# Patient Record
Sex: MALE | Race: WHITE | Employment: OTHER | URBAN - METROPOLITAN AREA
[De-identification: names, ages, dates, MRNs, and addresses within clinical notes are randomized per-mention and may not be internally consistent; named-entity substitution may affect disease eponyms.]

---

## 2017-01-10 PROBLEM — J44.9 CHRONIC OBSTRUCTIVE PULMONARY DISEASE (HCC): Status: ACTIVE | Noted: 2017-01-10

## 2017-01-10 PROBLEM — I10 ESSENTIAL HYPERTENSION: Status: ACTIVE | Noted: 2017-01-10

## 2017-01-10 PROBLEM — E78.2 ELEVATED TRIGLYCERIDES WITH HIGH CHOLESTEROL: Status: ACTIVE | Noted: 2017-01-10

## 2017-02-10 PROBLEM — F17.210 SMOKING GREATER THAN 40 PACK YEARS: Status: ACTIVE | Noted: 2017-02-10

## 2017-05-10 PROBLEM — J43.9 NOCTURNAL HYPOXEMIA DUE TO EMPHYSEMA (HCC): Status: ACTIVE | Noted: 2017-05-10

## 2017-05-10 PROBLEM — J43.9 PULMONARY EMPHYSEMA (HCC): Status: ACTIVE | Noted: 2017-05-10

## 2017-05-10 PROBLEM — G47.36 NOCTURNAL HYPOXEMIA DUE TO EMPHYSEMA (HCC): Status: ACTIVE | Noted: 2017-05-10

## 2017-05-10 PROBLEM — R00.1 BRADYCARDIA, UNSPECIFIED: Status: ACTIVE | Noted: 2017-05-10

## 2017-06-13 PROBLEM — Z99.89 OBSTRUCTIVE SLEEP APNEA TREATED WITH CONTINUOUS POSITIVE AIRWAY PRESSURE (CPAP): Status: ACTIVE | Noted: 2017-06-13

## 2017-06-13 PROBLEM — G47.33 OBSTRUCTIVE SLEEP APNEA TREATED WITH CONTINUOUS POSITIVE AIRWAY PRESSURE (CPAP): Status: ACTIVE | Noted: 2017-06-13

## 2017-06-13 PROBLEM — Z90.81 HX OF SPLENECTOMY: Status: ACTIVE | Noted: 2017-06-13

## 2017-09-15 PROBLEM — J43.2 CENTRILOBULAR EMPHYSEMA (HCC): Status: ACTIVE | Noted: 2017-09-15

## 2017-12-15 PROBLEM — K63.5 POLYP OF COLON: Status: ACTIVE | Noted: 2017-12-15
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
## 2017-12-15 PROBLEM — S70.361A TICK BITE OF RIGHT THIGH: Status: ACTIVE | Noted: 2017-12-15

## 2018-01-30 PROBLEM — K21.00 GASTROESOPHAGEAL REFLUX DISEASE WITH ESOPHAGITIS: Status: ACTIVE | Noted: 2018-01-30

## 2018-01-30 PROBLEM — C18.9 ADENOCARCINOMA OF COLON (HCC): Status: ACTIVE | Noted: 2018-01-30

## 2018-01-30 PROBLEM — K92.1 BLACK TARRY STOOLS: Status: ACTIVE | Noted: 2018-01-30

## 2018-01-30 PROBLEM — N17.9 AKI (ACUTE KIDNEY INJURY) (HCC): Status: ACTIVE | Noted: 2018-01-30

## 2018-02-12 PROBLEM — F17.210 SMOKING GREATER THAN 40 PACK YEARS: Status: RESOLVED | Noted: 2017-02-10 | Resolved: 2018-02-12

## 2018-03-19 ENCOUNTER — HOSPITAL ENCOUNTER (OUTPATIENT)
Age: 68
Discharge: HOME | End: 2018-03-21
Payer: MEDICARE

## 2018-03-19 ENCOUNTER — NURSE ONLY (OUTPATIENT)
Dept: FAMILY MEDICINE CLINIC | Age: 68
End: 2018-03-19
Payer: MEDICARE

## 2018-03-19 DIAGNOSIS — E78.2 ELEVATED TRIGLYCERIDES WITH HIGH CHOLESTEROL: ICD-10-CM

## 2018-03-19 DIAGNOSIS — I10 ESSENTIAL HYPERTENSION: ICD-10-CM

## 2018-03-19 DIAGNOSIS — C18.9 ADENOCARCINOMA OF COLON (HCC): ICD-10-CM

## 2018-03-19 DIAGNOSIS — C18.9 ADENOCARCINOMA OF COLON (HCC): Primary | ICD-10-CM

## 2018-03-19 LAB
BASOPHILS ABSOLUTE: 0.08 E9/L (ref 0–0.2)
BASOPHILS RELATIVE PERCENT: 1.4 % (ref 0–2)
EOSINOPHILS ABSOLUTE: 0.2 E9/L (ref 0.05–0.5)
EOSINOPHILS RELATIVE PERCENT: 3.4 % (ref 0–6)
HCT VFR BLD CALC: 41.8 % (ref 37–54)
HEMOGLOBIN: 13.2 G/DL (ref 12.5–16.5)
IMMATURE GRANULOCYTES #: 0.01 E9/L
IMMATURE GRANULOCYTES %: 0.2 % (ref 0–5)
LYMPHOCYTES ABSOLUTE: 1.9 E9/L (ref 1.5–4)
LYMPHOCYTES RELATIVE PERCENT: 32.5 % (ref 20–42)
MCH RBC QN AUTO: 28.6 PG (ref 26–35)
MCHC RBC AUTO-ENTMCNC: 31.6 % (ref 32–34.5)
MCV RBC AUTO: 90.5 FL (ref 80–99.9)
MONOCYTES ABSOLUTE: 0.86 E9/L (ref 0.1–0.95)
MONOCYTES RELATIVE PERCENT: 14.7 % (ref 2–12)
NEUTROPHILS ABSOLUTE: 2.8 E9/L (ref 1.8–7.3)
NEUTROPHILS RELATIVE PERCENT: 47.8 % (ref 43–80)
PDW BLD-RTO: 14.9 FL (ref 11.5–15)
PLATELET # BLD: 353 E9/L (ref 130–450)
PMV BLD AUTO: 9.9 FL (ref 7–12)
RBC # BLD: 4.62 E12/L (ref 3.8–5.8)
WBC # BLD: 5.9 E9/L (ref 4.5–11.5)

## 2018-03-19 PROCEDURE — 36415 COLL VENOUS BLD VENIPUNCTURE: CPT | Performed by: NURSE PRACTITIONER

## 2018-03-19 PROCEDURE — 85025 COMPLETE CBC W/AUTO DIFF WBC: CPT

## 2018-03-20 ENCOUNTER — OFFICE VISIT (OUTPATIENT)
Dept: FAMILY MEDICINE CLINIC | Age: 68
End: 2018-03-20
Payer: MEDICARE

## 2018-03-20 VITALS
OXYGEN SATURATION: 97 % | TEMPERATURE: 98.6 F | SYSTOLIC BLOOD PRESSURE: 112 MMHG | BODY MASS INDEX: 26.38 KG/M2 | HEIGHT: 72 IN | RESPIRATION RATE: 16 BRPM | DIASTOLIC BLOOD PRESSURE: 68 MMHG | WEIGHT: 194.75 LBS | HEART RATE: 83 BPM

## 2018-03-20 DIAGNOSIS — I10 ESSENTIAL HYPERTENSION: Primary | ICD-10-CM

## 2018-03-20 DIAGNOSIS — C18.9 ADENOCARCINOMA OF COLON (HCC): ICD-10-CM

## 2018-03-20 PROCEDURE — 99213 OFFICE O/P EST LOW 20 MIN: CPT | Performed by: NURSE PRACTITIONER

## 2018-03-20 RX ORDER — IBUPROFEN 800 MG/1
TABLET ORAL
Refills: 0 | COMMUNITY
Start: 2018-02-17 | End: 2018-07-06 | Stop reason: ALTCHOICE

## 2018-03-20 ASSESSMENT — ENCOUNTER SYMPTOMS
VOMITING: 0
NAUSEA: 0
TROUBLE SWALLOWING: 1
SINUS PRESSURE: 0
COLOR CHANGE: 0
FACIAL SWELLING: 0
BACK PAIN: 0
SHORTNESS OF BREATH: 0
CONSTIPATION: 0
CHEST TIGHTNESS: 0
ABDOMINAL PAIN: 0
RHINORRHEA: 0
COUGH: 1
DIARRHEA: 0
CHOKING: 0
WHEEZING: 0
VOICE CHANGE: 0
SORE THROAT: 0
SINUS PAIN: 0
STRIDOR: 0

## 2018-03-20 NOTE — PATIENT INSTRUCTIONS
Patient Education        High Blood Pressure: Care Instructions  Your Care Instructions    If your blood pressure is usually above 140/90, you have high blood pressure, or hypertension. That means the top number is 140 or higher or the bottom number is 90 or higher, or both. Despite what a lot of people think, high blood pressure usually doesn't cause headaches or make you feel dizzy or lightheaded. It usually has no symptoms. But it does increase your risk for heart attack, stroke, and kidney or eye damage. The higher your blood pressure, the more your risk increases. Your doctor will give you a goal for your blood pressure. Your goal will be based on your health and your age. An example of a goal is to keep your blood pressure below 140/90. Lifestyle changes, such as eating healthy and being active, are always important to help lower blood pressure. You might also take medicine to reach your blood pressure goal.  Follow-up care is a key part of your treatment and safety. Be sure to make and go to all appointments, and call your doctor if you are having problems. It's also a good idea to know your test results and keep a list of the medicines you take. How can you care for yourself at home? Medical treatment  · If you stop taking your medicine, your blood pressure will go back up. You may take one or more types of medicine to lower your blood pressure. Be safe with medicines. Take your medicine exactly as prescribed. Call your doctor if you think you are having a problem with your medicine. · Talk to your doctor before you start taking aspirin every day. Aspirin can help certain people lower their risk of a heart attack or stroke. But taking aspirin isn't right for everyone, because it can cause serious bleeding. · See your doctor regularly. You may need to see the doctor more often at first or until your blood pressure comes down.   · If you are taking blood pressure medicine, talk to your doctor before arms.  ¨ Lightheadedness or sudden weakness. ¨ A fast or irregular heartbeat. ? · You have symptoms of a stroke. These may include:  ¨ Sudden numbness, tingling, weakness, or loss of movement in your face, arm, or leg, especially on only one side of your body. ¨ Sudden vision changes. ¨ Sudden trouble speaking. ¨ Sudden confusion or trouble understanding simple statements. ¨ Sudden problems with walking or balance. ¨ A sudden, severe headache that is different from past headaches. ? · You have severe back or belly pain. ?Do not wait until your blood pressure comes down on its own. Get help right away. ?Call your doctor now or seek immediate care if:  ? · Your blood pressure is much higher than normal (such as 180/110 or higher), but you don't have symptoms. ? · You think high blood pressure is causing symptoms, such as:  ¨ Severe headache. ¨ Blurry vision. ? Watch closely for changes in your health, and be sure to contact your doctor if:  ? · Your blood pressure measures 140/90 or higher at least 2 times. That means the top number is 140 or higher or the bottom number is 90 or higher, or both. ? · You think you may be having side effects from your blood pressure medicine. ? · Your blood pressure is usually normal, but it goes above normal at least 2 times. Where can you learn more? Go to https://Big Box LabspeZazoo.IES. org and sign in to your IPICO account. Enter F335 in the IQMax box to learn more about \"High Blood Pressure: Care Instructions. \"     If you do not have an account, please click on the \"Sign Up Now\" link. Current as of: Hortensia 10, 2017  Content Version: 11.5  © 9464-0380 [employer]. Care instructions adapted under license by [facility] ([facility]).  If you have questions about a medical condition or this instruction, always ask your healthcare professional. Zohaib Sevilla any warranty or liability for your use of this information.

## 2018-06-22 ENCOUNTER — TELEPHONE (OUTPATIENT)
Dept: FAMILY MEDICINE CLINIC | Age: 68
End: 2018-06-22

## 2018-06-25 ENCOUNTER — HOSPITAL ENCOUNTER (OUTPATIENT)
Age: 68
Discharge: HOME | End: 2018-06-27
Payer: MEDICARE

## 2018-06-25 ENCOUNTER — NURSE ONLY (OUTPATIENT)
Dept: FAMILY MEDICINE CLINIC | Age: 68
End: 2018-06-25
Payer: MEDICARE

## 2018-06-25 DIAGNOSIS — R00.1 BRADYCARDIA: ICD-10-CM

## 2018-06-25 DIAGNOSIS — I10 ESSENTIAL HYPERTENSION: ICD-10-CM

## 2018-06-25 DIAGNOSIS — K21.00 GASTROESOPHAGEAL REFLUX DISEASE WITH ESOPHAGITIS: ICD-10-CM

## 2018-06-25 DIAGNOSIS — E78.2 ELEVATED TRIGLYCERIDES WITH HIGH CHOLESTEROL: ICD-10-CM

## 2018-06-25 DIAGNOSIS — J43.9 PULMONARY EMPHYSEMA, UNSPECIFIED EMPHYSEMA TYPE (HCC): ICD-10-CM

## 2018-06-25 DIAGNOSIS — J43.2 CENTRILOBULAR EMPHYSEMA (HCC): ICD-10-CM

## 2018-06-25 DIAGNOSIS — I10 ESSENTIAL HYPERTENSION: Primary | ICD-10-CM

## 2018-06-25 LAB
ALBUMIN SERPL-MCNC: 4.1 G/DL (ref 3.5–5.2)
ALP BLD-CCNC: 73 U/L (ref 40–129)
ALT SERPL-CCNC: 9 U/L (ref 0–40)
ANION GAP SERPL CALCULATED.3IONS-SCNC: 13 MMOL/L (ref 7–16)
AST SERPL-CCNC: 15 U/L (ref 0–39)
BASOPHILS ABSOLUTE: 0.12 E9/L (ref 0–0.2)
BASOPHILS RELATIVE PERCENT: 2 % (ref 0–2)
BILIRUB SERPL-MCNC: 0.4 MG/DL (ref 0–1.2)
BUN BLDV-MCNC: 19 MG/DL (ref 8–23)
CALCIUM SERPL-MCNC: 9.2 MG/DL (ref 8.6–10.2)
CHLORIDE BLD-SCNC: 100 MMOL/L (ref 98–107)
CHOLESTEROL, TOTAL: 186 MG/DL (ref 0–199)
CO2: 27 MMOL/L (ref 22–29)
CREAT SERPL-MCNC: 1.2 MG/DL (ref 0.7–1.2)
EOSINOPHILS ABSOLUTE: 0.22 E9/L (ref 0.05–0.5)
EOSINOPHILS RELATIVE PERCENT: 3.7 % (ref 0–6)
GFR AFRICAN AMERICAN: >60
GFR NON-AFRICAN AMERICAN: >60 ML/MIN/1.73
GLUCOSE BLD-MCNC: 88 MG/DL (ref 74–109)
HCT VFR BLD CALC: 43.4 % (ref 37–54)
HDLC SERPL-MCNC: 42 MG/DL
HEMOGLOBIN: 13.6 G/DL (ref 12.5–16.5)
IMMATURE GRANULOCYTES #: 0.02 E9/L
IMMATURE GRANULOCYTES %: 0.3 % (ref 0–5)
LDL CHOLESTEROL CALCULATED: 123 MG/DL (ref 0–99)
LYMPHOCYTES ABSOLUTE: 1.88 E9/L (ref 1.5–4)
LYMPHOCYTES RELATIVE PERCENT: 31.4 % (ref 20–42)
MCH RBC QN AUTO: 28.2 PG (ref 26–35)
MCHC RBC AUTO-ENTMCNC: 31.3 % (ref 32–34.5)
MCV RBC AUTO: 89.9 FL (ref 80–99.9)
MONOCYTES ABSOLUTE: 0.92 E9/L (ref 0.1–0.95)
MONOCYTES RELATIVE PERCENT: 15.4 % (ref 2–12)
NEUTROPHILS ABSOLUTE: 2.83 E9/L (ref 1.8–7.3)
NEUTROPHILS RELATIVE PERCENT: 47.2 % (ref 43–80)
PDW BLD-RTO: 15.1 FL (ref 11.5–15)
PLATELET # BLD: 370 E9/L (ref 130–450)
PMV BLD AUTO: 9.3 FL (ref 7–12)
POTASSIUM SERPL-SCNC: 4.9 MMOL/L (ref 3.5–5)
RBC # BLD: 4.83 E12/L (ref 3.8–5.8)
SODIUM BLD-SCNC: 140 MMOL/L (ref 132–146)
TOTAL PROTEIN: 7 G/DL (ref 6.4–8.3)
TRIGL SERPL-MCNC: 106 MG/DL (ref 0–149)
VLDLC SERPL CALC-MCNC: 21 MG/DL
WBC # BLD: 6 E9/L (ref 4.5–11.5)

## 2018-06-25 PROCEDURE — 85025 COMPLETE CBC W/AUTO DIFF WBC: CPT

## 2018-06-25 PROCEDURE — 36415 COLL VENOUS BLD VENIPUNCTURE: CPT | Performed by: NURSE PRACTITIONER

## 2018-06-25 PROCEDURE — 80061 LIPID PANEL: CPT

## 2018-06-25 PROCEDURE — 80053 COMPREHEN METABOLIC PANEL: CPT

## 2018-07-06 ENCOUNTER — OFFICE VISIT (OUTPATIENT)
Dept: FAMILY MEDICINE CLINIC | Age: 68
End: 2018-07-06
Payer: MEDICARE

## 2018-07-06 VITALS
BODY MASS INDEX: 26.04 KG/M2 | OXYGEN SATURATION: 94 % | HEART RATE: 66 BPM | TEMPERATURE: 98.5 F | DIASTOLIC BLOOD PRESSURE: 68 MMHG | SYSTOLIC BLOOD PRESSURE: 112 MMHG | WEIGHT: 192.25 LBS | HEIGHT: 72 IN | RESPIRATION RATE: 18 BRPM

## 2018-07-06 DIAGNOSIS — D72.821 MONOCYTOSIS: ICD-10-CM

## 2018-07-06 DIAGNOSIS — K22.2 ESOPHAGEAL STRICTURE: Primary | ICD-10-CM

## 2018-07-06 DIAGNOSIS — J43.2 CENTRILOBULAR EMPHYSEMA (HCC): ICD-10-CM

## 2018-07-06 DIAGNOSIS — I10 ESSENTIAL HYPERTENSION: ICD-10-CM

## 2018-07-06 PROBLEM — H43.819 VITREOUS DEGENERATION: Status: ACTIVE | Noted: 2018-07-06

## 2018-07-06 PROBLEM — H26.40 AFTER-CATARACT: Status: ACTIVE | Noted: 2018-07-06

## 2018-07-06 PROBLEM — H52.4 PRESBYOPIA: Status: ACTIVE | Noted: 2018-07-06

## 2018-07-06 PROCEDURE — G0009 ADMIN PNEUMOCOCCAL VACCINE: HCPCS | Performed by: NURSE PRACTITIONER

## 2018-07-06 PROCEDURE — 99214 OFFICE O/P EST MOD 30 MIN: CPT | Performed by: NURSE PRACTITIONER

## 2018-07-06 PROCEDURE — 90732 PPSV23 VACC 2 YRS+ SUBQ/IM: CPT | Performed by: NURSE PRACTITIONER

## 2018-07-06 ASSESSMENT — ENCOUNTER SYMPTOMS
SINUS PAIN: 0
TROUBLE SWALLOWING: 0
WHEEZING: 0
CHOKING: 1
CHEST TIGHTNESS: 0
VOICE CHANGE: 0
RHINORRHEA: 0
SORE THROAT: 0
DIARRHEA: 0
ABDOMINAL PAIN: 1
SINUS PRESSURE: 0
COLOR CHANGE: 0
COUGH: 0
CONSTIPATION: 1
VOMITING: 0
SHORTNESS OF BREATH: 0
FACIAL SWELLING: 0
BACK PAIN: 0
NAUSEA: 0

## 2018-07-06 NOTE — PATIENT INSTRUCTIONS
ibuprofen. Some of these medicines can raise blood pressure. · Learn how to check your blood pressure at home. Lifestyle changes  · Stay at a healthy weight. This is especially important if you put on weight around the waist. Losing even 10 pounds can help you lower your blood pressure. · If your doctor recommends it, get more exercise. Walking is a good choice. Bit by bit, increase the amount you walk every day. Try for at least 30 minutes on most days of the week. You also may want to swim, bike, or do other activities. · Avoid or limit alcohol. Talk to your doctor about whether you can drink any alcohol. · Try to limit how much sodium you eat to less than 2,300 milligrams (mg) a day. Your doctor may ask you to try to eat less than 1,500 mg a day. · Eat plenty of fruits (such as bananas and oranges), vegetables, legumes, whole grains, and low-fat dairy products. · Lower the amount of saturated fat in your diet. Saturated fat is found in animal products such as milk, cheese, and meat. Limiting these foods may help you lose weight and also lower your risk for heart disease. · Do not smoke. Smoking increases your risk for heart attack and stroke. If you need help quitting, talk to your doctor about stop-smoking programs and medicines. These can increase your chances of quitting for good. When should you call for help? Call 911 anytime you think you may need emergency care. This may mean having symptoms that suggest that your blood pressure is causing a serious heart or blood vessel problem. Your blood pressure may be over 180/110.   For example, call 911 if:    · You have symptoms of a heart attack. These may include:  ¨ Chest pain or pressure, or a strange feeling in the chest.  ¨ Sweating. ¨ Shortness of breath. ¨ Nausea or vomiting. ¨ Pain, pressure, or a strange feeling in the back, neck, jaw, or upper belly or in one or both shoulders or arms. ¨ Lightheadedness or sudden weakness.   ¨ A fast or Esophageal Stricture  What is an esophageal stricture? A stricture is a narrowing in one area of the esophagus, the tube that carries food and liquid to your stomach. It most often happens close to where the esophagus meets the stomach. A stricture can make it hard to swallow. You may feel like food gets stuck in your esophagus. If you have gastroesophageal reflux disease (GERD), stomach acid can flow backward into the esophagus. This can damage the lining of your esophagus and cause a stricture. Other things that can cause a stricture include:  · Surgery, radiation, or other treatments on the esophagus. · Some diseases and infections. · Reactions to some chemicals or medicines. How are strictures diagnosed? Your doctor may check your esophagus if you are having trouble swallowing or if you feel like food is getting stuck. The doctor will use a tool called an endoscope, or scope. It's a thin, flexible, lighted viewing tool. It goes into the mouth and down the throat. Your doctor can use it to check for any problems. The scope can also be used to take a sample of tissue to test (biopsy). You might need an X-ray. For the X-ray, you may need to swallow a substance, such as barium, that makes it easier to see what happens in your esophagus. How are strictures treated? Strictures are usually treated with a procedure called esophageal dilation. Dilation can open up narrow areas of the esophagus. Before the procedure, you will get medicines through a needle in your vein (IV) in your arm or hand. These medicines reduce pain and will make you feel relaxed and drowsy. Your throat will also be numbed. You may not remember much about the treatment. The doctor will guide a balloon or a plastic tool for widening (dilator) down your throat and into your esophagus. The dilator is used to widen any narrow area. To guide the dilator, the doctor may use a scope. Or he or she may use a thin wire as a guide.   After the

## 2018-07-06 NOTE — PROGRESS NOTES
Results   Component Value Date    CHOL 186 06/25/2018    CHOL 165 01/19/2017     Lab Results   Component Value Date    TRIG 106 06/25/2018    TRIG 124 01/19/2017     Lab Results   Component Value Date    HDL 42 06/25/2018    HDL 39 01/19/2017     Lab Results   Component Value Date    LDLCALC 123 (H) 06/25/2018    LDLCALC 101 (H) 01/19/2017     Lab Results   Component Value Date    LABVLDL 21 06/25/2018    LABVLDL 25 01/19/2017       He would like the abdominal incision checked he thinks he may have a stitch. Will be painful off and on. Feels like you wrapped a thread around your finger and pulled it. S/P colon resection for adenocarcinoma.   Lab Results   Component Value Date    WBC 6.0 06/25/2018    HGB 13.6 06/25/2018    HCT 43.4 06/25/2018    MCV 89.9 06/25/2018     06/25/2018    LYMPHOPCT 31.4 06/25/2018    RBC 4.83 06/25/2018    MCH 28.2 06/25/2018    MCHC 31.3 (L) 06/25/2018    RDW 15.1 (H) 06/25/2018             Due for PPSV 23      Current Outpatient Prescriptions:     sucralfate (CARAFATE) 1 GM tablet, , Disp: , Rfl:     pantoprazole (PROTONIX) 40 MG tablet, , Disp: , Rfl:     albuterol sulfate HFA (PROAIR HFA) 108 (90 Base) MCG/ACT inhaler, Inhale 2 puffs into the lungs every 6 hours as needed for Wheezing, Disp: 1 Inhaler, Rfl: 1    ANORO ELLIPTA 62.5-25 MCG/INH AEPB inhaler, Inhale 1 puff into the lungs daily One puff inhalation daily, Disp: , Rfl:   Social History     Social History    Marital status: Single     Spouse name: N/A    Number of children: N/A    Years of education: N/A     Social History Main Topics    Smoking status: Former Smoker     Packs/day: 2.00     Types: Cigarettes     Start date: 1/10/1962     Quit date: 1/9/2018    Smokeless tobacco: Never Used    Alcohol use Yes      Comment: Social    Drug use: No    Sexual activity: Not Asked     Other Topics Concern    None     Social History Narrative    None       I have reviewed Tyrese's allergies, medications, problem visit:    Esophageal stricture New  Keep appt with Dr. Rebekah Bradley for esophageal dilatation    Essential hypertension stable  Hold the ramipril until able to swallow    Monocytosis worsening   Will continue to monitor with repeat CBCD in 2 weeks. Discussed follow up with oncologist Dr. Cheri Davidson as the patient cancelled the last appointment. Centrilobular emphysema-     PNEUMOVAX 23 subcutaneous/IM (Pneumococcal polysaccharide vaccine 23-valent >= 1yo)      Discussed if the abdominal discomfort worsens to call Dr. Jose Craig office for evaluation as he may be developing adhesions. Return in about 2 weeks (around 7/20/2018) for check CBC then see me in 3 months for BP. Discussed smoking cessation, Discussed exercising 30 minutes daily and Discussed taking medications as directed and adverse effects        I have reviewed my findings and recommendations with Myles Toledo.     Esperanza Dickson, NP-C, FNP-BC

## 2018-07-19 ENCOUNTER — HOSPITAL ENCOUNTER (OUTPATIENT)
Age: 68
Discharge: HOME | End: 2018-07-21
Payer: MEDICARE

## 2018-07-19 ENCOUNTER — NURSE ONLY (OUTPATIENT)
Dept: FAMILY MEDICINE CLINIC | Age: 68
End: 2018-07-19
Payer: MEDICARE

## 2018-07-19 DIAGNOSIS — D72.821 MONOCYTOSIS: ICD-10-CM

## 2018-07-19 DIAGNOSIS — K21.00 GASTROESOPHAGEAL REFLUX DISEASE WITH ESOPHAGITIS: ICD-10-CM

## 2018-07-19 DIAGNOSIS — I10 ESSENTIAL HYPERTENSION: ICD-10-CM

## 2018-07-19 DIAGNOSIS — E78.2 ELEVATED TRIGLYCERIDES WITH HIGH CHOLESTEROL: ICD-10-CM

## 2018-07-19 DIAGNOSIS — R00.1 BRADYCARDIA: ICD-10-CM

## 2018-07-19 DIAGNOSIS — J43.2 CENTRILOBULAR EMPHYSEMA (HCC): ICD-10-CM

## 2018-07-19 LAB
BASOPHILS ABSOLUTE: 0.08 E9/L (ref 0–0.2)
BASOPHILS RELATIVE PERCENT: 1.2 % (ref 0–2)
EOSINOPHILS ABSOLUTE: 0.17 E9/L (ref 0.05–0.5)
EOSINOPHILS RELATIVE PERCENT: 2.5 % (ref 0–6)
HCT VFR BLD CALC: 44.3 % (ref 37–54)
HEMOGLOBIN: 14 G/DL (ref 12.5–16.5)
IMMATURE GRANULOCYTES #: 0.01 E9/L
IMMATURE GRANULOCYTES %: 0.1 % (ref 0–5)
LYMPHOCYTES ABSOLUTE: 2.04 E9/L (ref 1.5–4)
LYMPHOCYTES RELATIVE PERCENT: 30.4 % (ref 20–42)
MCH RBC QN AUTO: 29 PG (ref 26–35)
MCHC RBC AUTO-ENTMCNC: 31.6 % (ref 32–34.5)
MCV RBC AUTO: 91.7 FL (ref 80–99.9)
MONOCYTES ABSOLUTE: 0.84 E9/L (ref 0.1–0.95)
MONOCYTES RELATIVE PERCENT: 12.5 % (ref 2–12)
NEUTROPHILS ABSOLUTE: 3.57 E9/L (ref 1.8–7.3)
NEUTROPHILS RELATIVE PERCENT: 53.3 % (ref 43–80)
PDW BLD-RTO: 15.6 FL (ref 11.5–15)
PLATELET # BLD: 357 E9/L (ref 130–450)
PMV BLD AUTO: 9.4 FL (ref 7–12)
RBC # BLD: 4.83 E12/L (ref 3.8–5.8)
WBC # BLD: 6.7 E9/L (ref 4.5–11.5)

## 2018-07-19 PROCEDURE — 85025 COMPLETE CBC W/AUTO DIFF WBC: CPT

## 2018-07-19 PROCEDURE — 36415 COLL VENOUS BLD VENIPUNCTURE: CPT | Performed by: NURSE PRACTITIONER

## 2018-07-20 ENCOUNTER — OFFICE VISIT (OUTPATIENT)
Dept: FAMILY MEDICINE CLINIC | Age: 68
End: 2018-07-20
Payer: MEDICARE

## 2018-07-20 VITALS
HEART RATE: 61 BPM | BODY MASS INDEX: 25.8 KG/M2 | RESPIRATION RATE: 18 BRPM | WEIGHT: 190.5 LBS | HEIGHT: 72 IN | TEMPERATURE: 98.2 F | DIASTOLIC BLOOD PRESSURE: 68 MMHG | OXYGEN SATURATION: 94 % | SYSTOLIC BLOOD PRESSURE: 106 MMHG

## 2018-07-20 DIAGNOSIS — D72.821 MONOCYTOSIS: Primary | ICD-10-CM

## 2018-07-20 DIAGNOSIS — J43.2 CENTRILOBULAR EMPHYSEMA (HCC): ICD-10-CM

## 2018-07-20 DIAGNOSIS — K22.2 ESOPHAGEAL STRICTURE: ICD-10-CM

## 2018-07-20 PROBLEM — N17.9 AKI (ACUTE KIDNEY INJURY) (HCC): Status: RESOLVED | Noted: 2018-01-30 | Resolved: 2018-07-20

## 2018-07-20 PROBLEM — J44.9 CHRONIC OBSTRUCTIVE PULMONARY DISEASE (HCC): Status: RESOLVED | Noted: 2017-01-10 | Resolved: 2018-07-20

## 2018-07-20 PROBLEM — J43.9 PULMONARY EMPHYSEMA (HCC): Status: RESOLVED | Noted: 2017-05-10 | Resolved: 2018-07-20

## 2018-07-20 PROBLEM — K92.1 BLACK TARRY STOOLS: Status: RESOLVED | Noted: 2018-01-30 | Resolved: 2018-07-20

## 2018-07-20 PROBLEM — S70.361A TICK BITE OF RIGHT THIGH: Status: RESOLVED | Noted: 2017-12-15 | Resolved: 2018-07-20
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
## 2018-07-20 PROCEDURE — 99213 OFFICE O/P EST LOW 20 MIN: CPT | Performed by: NURSE PRACTITIONER

## 2018-07-20 ASSESSMENT — ENCOUNTER SYMPTOMS
TROUBLE SWALLOWING: 0
SHORTNESS OF BREATH: 0
SINUS PAIN: 0
SORE THROAT: 0
ABDOMINAL PAIN: 1
VOMITING: 0
NAUSEA: 0
WHEEZING: 0
RHINORRHEA: 0
COLOR CHANGE: 0
DIARRHEA: 0
FACIAL SWELLING: 0
VOICE CHANGE: 0
COUGH: 0
CONSTIPATION: 0
CHEST TIGHTNESS: 0
SINUS PRESSURE: 0

## 2018-07-20 NOTE — PATIENT INSTRUCTIONS
Patient Education        Learning About Esophageal Stricture  What is an esophageal stricture? A stricture is a narrowing in one area of the esophagus, the tube that carries food and liquid to your stomach. It most often happens close to where the esophagus meets the stomach. A stricture can make it hard to swallow. You may feel like food gets stuck in your esophagus. If you have gastroesophageal reflux disease (GERD), stomach acid can flow backward into the esophagus. This can damage the lining of your esophagus and cause a stricture. Other things that can cause a stricture include:  · Surgery, radiation, or other treatments on the esophagus. · Some diseases and infections. · Reactions to some chemicals or medicines. How are strictures diagnosed? Your doctor may check your esophagus if you are having trouble swallowing or if you feel like food is getting stuck. The doctor will use a tool called an endoscope, or scope. It's a thin, flexible, lighted viewing tool. It goes into the mouth and down the throat. Your doctor can use it to check for any problems. The scope can also be used to take a sample of tissue to test (biopsy). You might need an X-ray. For the X-ray, you may need to swallow a substance, such as barium, that makes it easier to see what happens in your esophagus. How are strictures treated? Strictures are usually treated with a procedure called esophageal dilation. Dilation can open up narrow areas of the esophagus. Before the procedure, you will get medicines through a needle in your vein (IV) in your arm or hand. These medicines reduce pain and will make you feel relaxed and drowsy. Your throat will also be numbed. You may not remember much about the treatment. The doctor will guide a balloon or a plastic tool for widening (dilator) down your throat and into your esophagus. The dilator is used to widen any narrow area. To guide the dilator, the doctor may use a scope.  Or he or she may use a thin wire as a guide. After the procedure, you will be observed for 1 to 2 hours until the medicines wear off. If your throat was numbed before the test, you should not eat or drink until your throat is no longer numb. When you are fully recovered, you can go home. You will not be able to drive or operate machinery for 12 hours after the test. Your doctor will tell you when you can go back to your usual diet and activities. Do not drink alcohol for 12 to 24 hours after the test.  You may still need to treat some symptoms of GERD. Your doctor may give you information about that. Follow-up care is a key part of your treatment and safety. Be sure to make and go to all appointments, and call your doctor if you are having problems. It's also a good idea to know your test results and keep a list of the medicines you take. Where can you learn more? Go to https://[employer] Technology[employer]iller[employer]adsby.PubNative. org and sign in to your Attracta account. Enter P226 in the [employer] box to learn more about \"Learning About Esophageal Stricture. \"     If you do not have an account, please click on the \"Sign Up Now\" link. Current as of: September 28, 2017  Content Version: 11.6  © 9143-9264 [employer]. Care instructions adapted under license by [facility] ([facility]). If you have questions about a medical condition or this instruction, always ask your healthcare professional. [place] [address] any warranty or liability for your use of this information.

## 2018-07-20 NOTE — PROGRESS NOTES
hyperactive. OBJECTIVE:     VS:  Wt Readings from Last 3 Encounters:   07/20/18 190 lb 8 oz (86.4 kg)   07/06/18 192 lb 4 oz (87.2 kg)   03/20/18 194 lb 12 oz (88.3 kg)                       Vitals:    07/20/18 1050   BP: 106/68   Site: Right Arm   Position: Sitting   Cuff Size: Medium Adult   Pulse: 61   Resp: 18   Temp: 98.2 °F (36.8 °C)   TempSrc: Temporal   SpO2: 94%   Weight: 190 lb 8 oz (86.4 kg)   Height: 6' (1.829 m)       General: Alert and oriented to person, place, and time, well developed and well nourished, in no acute distress  Neck: supple and non-tender without mass, trachea midline, no cervical lymphadenopathy, no bruit, no thyromegaly or nodules  Cardiovascular: regular rate and regular rhythm, normal S1 and S2,  no murmurs, rubs, clicks, or gallop. Distal pulses intact, no carotid bruits. No edema  Pulmonary/Chest: clear to auscultation bilaterally, no wheezes, rales or rhonchi, normal air movement, no respiratory distress  Abdomen: soft, non-tender, non-distended, normal bowel sounds, no masses or hepatosplenomegaly  Extremities: no clubbing, cyanosis, or edema. Psychiatric: Good eye contact, normal mood and affect, answers questions appropriately    ASSESSMENT/PLAN   Seymour Barraza was seen today for 2 week follow-up, discuss labs and health maintenance. Diagnoses and all orders for this visit:    Monocytosis improving     Esophageal stricture improving          Return for keep follow up appt for AWV. Discussed exercising 30 minutes daily and Discussed taking medications as directed and adverse effects        I have reviewed my findings and recommendations with Niru Trevino.     Coco Denis, NP-C, FNP-BC

## 2018-08-23 ENCOUNTER — OFFICE VISIT (OUTPATIENT)
Dept: FAMILY MEDICINE CLINIC | Age: 68
End: 2018-08-23
Payer: MEDICARE

## 2018-08-23 VITALS
BODY MASS INDEX: 25.47 KG/M2 | SYSTOLIC BLOOD PRESSURE: 120 MMHG | OXYGEN SATURATION: 97 % | HEART RATE: 65 BPM | TEMPERATURE: 97.8 F | WEIGHT: 188 LBS | DIASTOLIC BLOOD PRESSURE: 62 MMHG | RESPIRATION RATE: 16 BRPM | HEIGHT: 72 IN

## 2018-08-23 DIAGNOSIS — K22.2 ESOPHAGEAL STRICTURE: Primary | ICD-10-CM

## 2018-08-23 DIAGNOSIS — Z13.31 SCREENING FOR DEPRESSION: ICD-10-CM

## 2018-08-23 DIAGNOSIS — K21.00 GASTROESOPHAGEAL REFLUX DISEASE WITH ESOPHAGITIS: ICD-10-CM

## 2018-08-23 PROCEDURE — 99213 OFFICE O/P EST LOW 20 MIN: CPT | Performed by: NURSE PRACTITIONER

## 2018-08-23 RX ORDER — OMEPRAZOLE 10 MG/1
10 CAPSULE, DELAYED RELEASE ORAL DAILY
COMMUNITY
End: 2019-01-07 | Stop reason: DRUGHIGH

## 2018-08-23 ASSESSMENT — PATIENT HEALTH QUESTIONNAIRE - PHQ9
SUM OF ALL RESPONSES TO PHQ QUESTIONS 1-9: 0
SUM OF ALL RESPONSES TO PHQ QUESTIONS 1-9: 0
1. LITTLE INTEREST OR PLEASURE IN DOING THINGS: 0
SUM OF ALL RESPONSES TO PHQ9 QUESTIONS 1 & 2: 0
2. FEELING DOWN, DEPRESSED OR HOPELESS: 0

## 2018-08-23 ASSESSMENT — ENCOUNTER SYMPTOMS
DIARRHEA: 0
VOMITING: 0
NAUSEA: 0
CONSTIPATION: 0
COUGH: 0
TROUBLE SWALLOWING: 1
CHEST TIGHTNESS: 0
RHINORRHEA: 0
SORE THROAT: 0
COLOR CHANGE: 0
VOICE CHANGE: 0
ABDOMINAL PAIN: 0
WHEEZING: 0
SHORTNESS OF BREATH: 0
SINUS PAIN: 0
SINUS PRESSURE: 0
FACIAL SWELLING: 0

## 2018-08-23 NOTE — PROGRESS NOTES
self-injury, sleep disturbance and suicidal ideas. The patient is not nervous/anxious and is not hyperactive. OBJECTIVE:     VS:  Wt Readings from Last 3 Encounters:   08/23/18 188 lb (85.3 kg)   07/20/18 190 lb 8 oz (86.4 kg)   07/06/18 192 lb 4 oz (87.2 kg)                       Vitals:    08/23/18 0813   BP: 120/62   Pulse: 65   Resp: 16   Temp: 97.8 °F (36.6 °C)   TempSrc: Temporal   SpO2: 97%   Weight: 188 lb (85.3 kg)   Height: 6' (1.829 m)       General: Alert and oriented to person, place, and time, well developed and well nourished, in no acute distress  SKIN: Warm and dry, intact without any rash, masses or lesions  HEAD: normocephalic, atraumatic  Eyes: sclera/conjunctiva clear, PERRLA, EOMI's intact  Neck: supple and non-tender without mass, trachea midline, no cervical lymphadenopathy, no bruit, no thyromegaly or nodules  Cardiovascular: regular rate and regular rhythm, normal S1 and S2,  no murmurs, rubs, clicks, or gallop. Distal pulses intact, no carotid bruits. No edema  Pulmonary/Chest: clear to auscultation bilaterally, no wheezes, rales or rhonchi, normal air movement, no respiratory distress  Abdomen: soft, non-tender, non-distended, normal bowel sounds, no masses or hepatosplenomegaly  Musculoskeletal: Normal ROM, no joint swelling, deformity or tenderness   Neurologic: reflexes normal and symmetric, no cranial nerve deficit, gait, coordination and speech normal  Extremities: no clubbing, cyanosis, or edema. Psychiatric: Good eye contact, normal mood and affect, answers questions appropriately    ASSESSMENT/PLAN   Twan Le was seen today for advice only and anorexia.     Diagnoses and all orders for this visit:    Esophageal stricture chronic    Gastroesophageal reflux disease with esophagitis  -Continue current medications  -Keep appointment with Dr. Kyra Cazares on Tuesday  -Discussed possible referral to either [facility] or  for second opinion or further evaluation of different treatment.   -No

## 2018-08-23 NOTE — PATIENT INSTRUCTIONS
Patient Education        Learning About Schatzki's Ring  What is Schatzki's ring? A Schatzki's ring is a ring of tissue that forms inside the esophagus, the tube that carries food and liquid to your stomach. This ring makes the esophagus narrow in one area, close to where it meets the stomach. It can make it hard to swallow. You may feel like food gets stuck in your esophagus. Doctors aren't sure exactly what causes these rings. The ring is also something you can be born with. How is Schatzki's ring diagnosed? Your doctor may check your esophagus if you are having trouble swallowing or if you feel like food is getting stuck. The doctor will use a tool called an endoscope, or scope. It's a thin, flexible, lighted viewing tool. It goes into the mouth and down the throat. Your doctor can use it to check for any problems. The scope can also be used to take a sample of tissue to test (biopsy). You might need an X-ray. For the X-ray, you may need to swallow a substance, such as barium, that makes it easier to see what happens in your esophagus. How is Schatzki's ring treated? A Schatzki's ring is usually treated with a procedure called esophageal dilation. Dilation can open up narrow areas of the esophagus. Before the procedure, you will get medicines through a needle in your vein (IV) in your arm or hand. These medicines reduce pain and will make you feel relaxed and drowsy. Your throat will also be numbed. You may not remember much about the treatment. The doctor will guide a balloon or a plastic tool for widening (dilator) down your throat and into your esophagus. The dilator is used to widen any narrow area. To guide the dilator, the doctor may use a scope. Or he or she may use a thin wire as a guide. After the procedure, you will be observed for 1 to 2 hours until the medicines wear off. If your throat was numbed before the test, you should not eat or drink until your throat is no longer numb.  When you

## 2018-09-21 ENCOUNTER — OFFICE VISIT (OUTPATIENT)
Dept: FAMILY MEDICINE CLINIC | Age: 68
End: 2018-09-21
Payer: MEDICARE

## 2018-09-21 VITALS
TEMPERATURE: 97.2 F | DIASTOLIC BLOOD PRESSURE: 66 MMHG | RESPIRATION RATE: 20 BRPM | HEIGHT: 71 IN | HEART RATE: 65 BPM | SYSTOLIC BLOOD PRESSURE: 124 MMHG | BODY MASS INDEX: 26.15 KG/M2 | WEIGHT: 186.8 LBS | OXYGEN SATURATION: 95 %

## 2018-09-21 DIAGNOSIS — Z23 NEEDS FLU SHOT: ICD-10-CM

## 2018-09-21 DIAGNOSIS — R91.8 MULTIPLE LUNG NODULES ON CT: ICD-10-CM

## 2018-09-21 DIAGNOSIS — K22.2 ESOPHAGEAL STRICTURE: Primary | ICD-10-CM

## 2018-09-21 PROCEDURE — 90662 IIV NO PRSV INCREASED AG IM: CPT | Performed by: NURSE PRACTITIONER

## 2018-09-21 PROCEDURE — G0008 ADMIN INFLUENZA VIRUS VAC: HCPCS | Performed by: NURSE PRACTITIONER

## 2018-09-21 PROCEDURE — 99213 OFFICE O/P EST LOW 20 MIN: CPT | Performed by: NURSE PRACTITIONER

## 2018-09-21 ASSESSMENT — ENCOUNTER SYMPTOMS
NAUSEA: 0
WHEEZING: 0
VOMITING: 0
SHORTNESS OF BREATH: 0
DIARRHEA: 0
SINUS PAIN: 0
BACK PAIN: 0
CONSTIPATION: 0
SORE THROAT: 0
CHEST TIGHTNESS: 0
TROUBLE SWALLOWING: 1
RHINORRHEA: 0
COUGH: 0
COLOR CHANGE: 0
SINUS PRESSURE: 0
VOICE CHANGE: 0
FACIAL SWELLING: 0
ABDOMINAL PAIN: 0

## 2018-09-21 NOTE — PROGRESS NOTES
OFFICE PROGRESS NOTE  [address] [facility] PR[facility]  [address] [facility] [address]  Dept: [phone]   Chief Complaint   Patient presents with    Results    Other     alpha gene results - ms    Health Maintenance     low dose CT lung screening due         HPI:     He saw Dr. Amanda Covington yesterday for esophageal dilatation and was not able to dilate with regular balloon, they ordered a smaller balloon and he was stretched yesterday to with a 4 mm balloon and was able to dilate to 5 mm. He will be seeing him next Thursday again and will try to do 1 mm at a time. He believes he is healing from the top down and getting closer to the lower esophageal sphincter. He has been pureeing all of his foods.      He follows with Dr. Haig Holter due to Centrilobular emphysema he resumed smoking but only when he is outside and he has been getting CT chest every 6 months due to a 2 nodules      Current Outpatient Prescriptions:     omeprazole (PRILOSEC) 10 MG delayed release capsule, Take 10 mg by mouth daily, Disp: , Rfl:     sucralfate (CARAFATE) 1 GM tablet, Take 1 g by mouth every morning (before breakfast) , Disp: , Rfl:     albuterol sulfate HFA (PROAIR HFA) 108 (90 Base) MCG/ACT inhaler, Inhale 2 puffs into the lungs every 6 hours as needed for Wheezing, Disp: 1 Inhaler, Rfl: 1    ANORO ELLIPTA 62.5-25 MCG/INH AEPB inhaler, Inhale 1 puff into the lungs daily One puff inhalation daily, Disp: , Rfl:   Social History     Social History    Marital status: Single     Spouse name: N/A    Number of children: N/A    Years of education: N/A     Social History Main Topics    Smoking status: Current Some Day Smoker     Packs/day: 2.00     Years: 30.00     Types: Cigarettes     Start date: 1/10/1962     Last attempt to quit: 1/9/2018    Smokeless tobacco: Never Used    Alcohol use Yes      Comment: Social    Drug use: No    Sexual activity: Not Asked     Other Dasia    Needs flu shot  -     INFLUENZA, HIGH DOSE, 65 YRS +, IM, PF, PREFILL SYR, 0.5ML (FLUZONE HD)  -Minor reactions soreness, redness, or swelling at the site the shot was given.  -hoarseness, sore, red or itchy eyes  -cough, fever, aching, headache, fatigue or itching can occur and can begin  Soon after the shot and last 1 or 2 days.  -Some people get severe pain in the shoulder and have difficulty moving  The arm where the shot was given, this happens rarely.    -Signs of severe reaction occur rarely can include: very high fever, or unusual behavior, hives, swelling of the face and throat, difficulty breathing, fast heartbeat, dizziness and weakness usually occur within a few minutes to a few hours after the vaccination if these occur CALL 911 and go to the nearest emergency room. Other orders  -     Cancel: CT LUNG SCREENING (ANNUAL); Future    Cancelled due to lung cancer screening done by Dr. Michelet Conteh          Return in about 2 weeks (around 10/5/2018) for medicare well exam 30 minutes. Discussed smoking cessation, Discussed exercising 30 minutes daily and Discussed taking medications as directed and adverse effects        I have reviewed my findings and recommendations with Sabina Redding.     Jae Yancey, NP-C, FNP-BC

## 2018-09-21 NOTE — PATIENT INSTRUCTIONS
pneumococcal vaccine (PCV13) and/or DTaP vaccine at the same time might be slightly more likely to have a seizure caused by fever. Ask your doctor for more information. Tell your doctor if a child who is getting flu vaccine has ever had a seizure  Problems that could happen after any injected vaccine:  · People sometimes faint after a medical procedure, including vaccination. Sitting or lying down for about 15 minutes can help prevent fainting, and injuries caused by a fall. Tell your doctor if you feel dizzy, or have vision changes or ringing in the ears. · Some people get severe pain in the shoulder and have difficulty moving the arm where a shot was given. This happens very rarely. · Any medication can cause a severe allergic reaction. Such reactions from a vaccine are very rare, estimated at about 1 in a million doses, and would happen within a few minutes to a few hours after the vaccination. As with any medicine, there is a very remote chance of a vaccine causing a serious injury or death. The safety of vaccines is always being monitored. For more information, visit: www.cdc.gov/vaccinesafety/. What if there is a serious reaction? What should I look for? · Look for anything that concerns you, such as signs of a severe allergic reaction, very high fever, or unusual behavior. Signs of a severe allergic reaction can include hives, swelling of the face and throat, difficulty breathing, a fast heartbeat, dizziness, and weakness - usually within a few minutes to a few hours after the vaccination. What should I do? · If you think it is a severe allergic reaction or other emergency that can't wait, call 9-1-1 and get the person to the nearest hospital. Otherwise, call your doctor. · Reactions should be reported to the \"Vaccine Adverse Event Reporting System\" (VAERS). Your doctor should file this report, or you can do it yourself through the VAERS website at www.vaers. [facility].gov, or by calling last up to 10 days. The flu can make you feel very sick, but most of the time it doesn't lead to other problems. But it can cause serious problems in people who are older or who have a long-term illness, such as heart disease or diabetes. You can help prevent the flu by getting a flu vaccine every year, as soon as it is available. You cannot get the flu from the vaccine. The vaccine prevents most cases of the flu. But even when the vaccine doesn't prevent the flu, it can make symptoms less severe and reduce the chance of problems from the flu. Sometimes, young children and people who have an immune system problem may have a slight fever or muscle aches or pains 6 to 12 hours after getting the shot. These symptoms usually last 1 or 2 days. Follow-up care is a key part of your treatment and safety. Be sure to make and go to all appointments, and call your doctor if you are having problems. It's also a good idea to know your test results and keep a list of the medicines you take. Who should get the flu vaccine? Everyone age 7 months or older should get a flu vaccine each year. It lowers the chance of getting and spreading the flu. The vaccine is very important for people who are at high risk for getting other health problems from the flu. This includes:  · Anyone 48years of age or older. · People who live in a long-term care center, such as a nursing home. · All children 6 months through 25years of age. · Adults and children 6 months and older who have long-term heart or lung problems, such as asthma. · Adults and children 6 months and older who needed medical care or were in a hospital during the past year because of diabetes, chronic kidney disease, or a weak immune system (including HIV or AIDS). · Women who will be pregnant during the flu season. · People who have any condition that can make it hard to breathe or swallow (such as a brain injury or muscle disorders).   · People who can give the flu to

## 2018-10-05 ENCOUNTER — OFFICE VISIT (OUTPATIENT)
Dept: FAMILY MEDICINE CLINIC | Age: 68
End: 2018-10-05
Payer: MEDICARE

## 2018-10-05 ENCOUNTER — TELEPHONE (OUTPATIENT)
Dept: FAMILY MEDICINE CLINIC | Age: 68
End: 2018-10-05

## 2018-10-05 ENCOUNTER — HOSPITAL ENCOUNTER (OUTPATIENT)
Age: 68
Discharge: HOME | End: 2018-10-07
Payer: MEDICARE

## 2018-10-05 VITALS
RESPIRATION RATE: 16 BRPM | DIASTOLIC BLOOD PRESSURE: 72 MMHG | SYSTOLIC BLOOD PRESSURE: 106 MMHG | HEART RATE: 77 BPM | TEMPERATURE: 98 F | OXYGEN SATURATION: 97 % | HEIGHT: 71 IN | WEIGHT: 185.8 LBS | BODY MASS INDEX: 26.01 KG/M2

## 2018-10-05 DIAGNOSIS — Z00.00 ROUTINE GENERAL MEDICAL EXAMINATION AT A HEALTH CARE FACILITY: Primary | ICD-10-CM

## 2018-10-05 DIAGNOSIS — I10 ESSENTIAL HYPERTENSION: ICD-10-CM

## 2018-10-05 DIAGNOSIS — Z12.5 SCREENING PSA (PROSTATE SPECIFIC ANTIGEN): ICD-10-CM

## 2018-10-05 DIAGNOSIS — Z23 NEED FOR PROPHYLACTIC VACCINATION WITH TETANUS-DIPHTHERIA (TD): ICD-10-CM

## 2018-10-05 DIAGNOSIS — C18.9 ADENOCARCINOMA OF COLON (HCC): ICD-10-CM

## 2018-10-05 DIAGNOSIS — Z13.6 SCREENING FOR AAA (ABDOMINAL AORTIC ANEURYSM): ICD-10-CM

## 2018-10-05 LAB
ALBUMIN SERPL-MCNC: 4.3 G/DL (ref 3.5–5.2)
ALP BLD-CCNC: 87 U/L (ref 40–129)
ALT SERPL-CCNC: 15 U/L (ref 0–40)
ANION GAP SERPL CALCULATED.3IONS-SCNC: 19 MMOL/L (ref 7–16)
AST SERPL-CCNC: 21 U/L (ref 0–39)
BASOPHILS ABSOLUTE: 0.08 E9/L (ref 0–0.2)
BASOPHILS RELATIVE PERCENT: 1 % (ref 0–2)
BILIRUB SERPL-MCNC: 0.2 MG/DL (ref 0–1.2)
BUN BLDV-MCNC: 15 MG/DL (ref 8–23)
CALCIUM SERPL-MCNC: 9.9 MG/DL (ref 8.6–10.2)
CEA: 3.2 NG/ML (ref 0–5.2)
CHLORIDE BLD-SCNC: 97 MMOL/L (ref 98–107)
CO2: 23 MMOL/L (ref 22–29)
CREAT SERPL-MCNC: 1 MG/DL (ref 0.7–1.2)
EOSINOPHILS ABSOLUTE: 0.18 E9/L (ref 0.05–0.5)
EOSINOPHILS RELATIVE PERCENT: 2.1 % (ref 0–6)
GFR AFRICAN AMERICAN: >60
GFR NON-AFRICAN AMERICAN: >60 ML/MIN/1.73
GLUCOSE BLD-MCNC: 86 MG/DL (ref 74–109)
HCT VFR BLD CALC: 45.7 % (ref 37–54)
HEMOGLOBIN: 14.4 G/DL (ref 12.5–16.5)
IMMATURE GRANULOCYTES #: 0.02 E9/L
IMMATURE GRANULOCYTES %: 0.2 % (ref 0–5)
LYMPHOCYTES ABSOLUTE: 1.96 E9/L (ref 1.5–4)
LYMPHOCYTES RELATIVE PERCENT: 23.3 % (ref 20–42)
MCH RBC QN AUTO: 29 PG (ref 26–35)
MCHC RBC AUTO-ENTMCNC: 31.5 % (ref 32–34.5)
MCV RBC AUTO: 92.1 FL (ref 80–99.9)
MONOCYTES ABSOLUTE: 1.08 E9/L (ref 0.1–0.95)
MONOCYTES RELATIVE PERCENT: 12.8 % (ref 2–12)
NEUTROPHILS ABSOLUTE: 5.1 E9/L (ref 1.8–7.3)
NEUTROPHILS RELATIVE PERCENT: 60.6 % (ref 43–80)
PDW BLD-RTO: 13.8 FL (ref 11.5–15)
PLATELET # BLD: 359 E9/L (ref 130–450)
PMV BLD AUTO: 9.6 FL (ref 7–12)
POTASSIUM SERPL-SCNC: 5 MMOL/L (ref 3.5–5)
PROSTATE SPECIFIC ANTIGEN: 1.26 NG/ML (ref 0–4)
RBC # BLD: 4.96 E12/L (ref 3.8–5.8)
SODIUM BLD-SCNC: 139 MMOL/L (ref 132–146)
TOTAL PROTEIN: 7.3 G/DL (ref 6.4–8.3)
WBC # BLD: 8.4 E9/L (ref 4.5–11.5)

## 2018-10-05 PROCEDURE — G0439 PPPS, SUBSEQ VISIT: HCPCS | Performed by: NURSE PRACTITIONER

## 2018-10-05 PROCEDURE — G0103 PSA SCREENING: HCPCS

## 2018-10-05 PROCEDURE — 82378 CARCINOEMBRYONIC ANTIGEN: CPT

## 2018-10-05 PROCEDURE — 85025 COMPLETE CBC W/AUTO DIFF WBC: CPT

## 2018-10-05 PROCEDURE — 80053 COMPREHEN METABOLIC PANEL: CPT

## 2018-10-05 RX ORDER — TETANUS AND DIPHTHERIA TOXOIDS ADSORBED 2; 2 [LF]/.5ML; [LF]/.5ML
0.5 INJECTION INTRAMUSCULAR ONCE
Qty: 0.5 ML | Refills: 0 | Status: SHIPPED | OUTPATIENT
Start: 2018-10-05 | End: 2018-10-05

## 2018-10-05 ASSESSMENT — ANXIETY QUESTIONNAIRES: GAD7 TOTAL SCORE: 0

## 2018-10-05 ASSESSMENT — PATIENT HEALTH QUESTIONNAIRE - PHQ9
SUM OF ALL RESPONSES TO PHQ QUESTIONS 1-9: 0
SUM OF ALL RESPONSES TO PHQ QUESTIONS 1-9: 0

## 2018-10-05 ASSESSMENT — LIFESTYLE VARIABLES: HOW OFTEN DO YOU HAVE A DRINK CONTAINING ALCOHOL: 0

## 2018-10-05 NOTE — PATIENT INSTRUCTIONS
distract yourself from urges to smoke. Talk to someone, go for a walk, or occupy your time with a task. When you first try to quit, change your routine. Take a different route to work. Drink tea instead of coffee. Eat breakfast in a different place. Do something to reduce your stress. Take a hot bath, exercise, or read a book. Plan something enjoyable to do every day. Reward yourself for not smoking. Explore interactive web-based programs that specialize in helping you quit. 4. GET MEDICINE AND USE IT CORRECTLY  Medicines can help you stop smoking and decrease the urge to smoke. Combining medicine with the above behavioral methods and support can quadruple your chances of successfully quitting smoking. The U.S. Food and Drug Administration (FDA) has approved 7 medicines to help you quit smoking. These medicines fall into 3 categories. Nicotine replacement therapy (delivers nicotine to your body without the negative effects and risks of smoking):  Nicotine gum: Available over-the-counter. Nicotine lozenges: Available over-the-counter. Nicotine inhaler: Available by prescription. Nicotine nasal spray: Available by prescription. Nicotine skin patches (transdermal): Available by prescription and over-the-counter. Antidepressant medicine (helps people abstain from smoking, but how this works is unknown): Bupropion sustained-release (SR) tablets: Available by prescription. Nicotinic receptor partial agonist (simulates the effect of nicotine in your brain):  Varenicline tartrate tablets: Available by prescription. Ask your caregiver for advice about which medicines to use and how to use them. Carefully read the information on the package. Everyone who is trying to quit may benefit from using a medicine.  If you are pregnant or trying to become pregnant, nursing an infant, you are under age 25, or you smoke fewer than 10 cigarettes per day, talk to your caregiver before taking any nicotine replacement

## 2018-10-05 NOTE — PROGRESS NOTES
Status  Current Some Day Smoker Smoking Start Date  1/10/1962 Last attempt to quit  1/9/2018 Smoking Frequency  2 packs/day for 30 years (61 pk yrs)    Smoking Tobacco Type  Cigarettes    Smokeless Tobacco Use  Never Used          Alcohol History     Alcohol Use Status  Yes Comment  Social          Drug Use     Drug Use Status  No          Sexual Activity     Sexually Active  Not Asked               Audit Questionnaire: Screen for Alcohol Misuse  How often do you have a drink containing alcohol?: Never  Substance Abuse Interventions:  · Tobacco abuse:  provider spent 10 minutes counseling patient, he says it is his lifestyle and he is only smoking when he is 6 - 10 a day states he doesn't smoke a whole one    Health Habits/Nutrition:  Health Habits/Nutrition  Do you exercise for at least 20 minutes 2-3 times per week?: Yes  Have you lost any weight without trying in the past 3 months?: (!) Yes  Do you eat fewer than 2 meals per day?: No  Have you seen a dentist within the past year?: Yes  Body mass index is 25.91 kg/m².   Health Habits/Nutrition Interventions:  · Nutritional issues:  patient has esophageal stricture and is going through treatment with dilation has second opinion in [place] October 12, 2018, has follow up with Dr Forest Medeiros October 11, 2018    Safety:  Safety  Do you have working smoke detectors?: (!) No  Have all throw rugs been removed or fastened?: Yes  Do you have non-slip mats in all bathtubs?: Yes  Do all of your stairways have a railing or banister?: Yes  Are your doorways, halls and stairs free of clutter?: Yes  Do you always fasten your seatbelt when you are in a car?: (!) No  Safety Interventions:  · Home safety tips provided    Personalized Preventive Plan   Current Health Maintenance Status  Immunization History   Administered Date(s) Administered    Influenza, High Dose (Fluzone 65 yrs and older) 09/15/2017, 09/21/2018    Pneumococcal 13-valent Conjugate (David Smiles) 05/10/2017   

## 2018-10-08 ENCOUNTER — TELEPHONE (OUTPATIENT)
Dept: FAMILY MEDICINE CLINIC | Age: 68
End: 2018-10-08

## 2019-01-07 ENCOUNTER — OFFICE VISIT (OUTPATIENT)
Dept: FAMILY MEDICINE CLINIC | Age: 69
End: 2019-01-07
Payer: MEDICARE

## 2019-01-07 ENCOUNTER — HOSPITAL ENCOUNTER (OUTPATIENT)
Age: 69
Discharge: HOME | End: 2019-01-09
Payer: MEDICARE

## 2019-01-07 VITALS
DIASTOLIC BLOOD PRESSURE: 72 MMHG | BODY MASS INDEX: 26.38 KG/M2 | HEART RATE: 83 BPM | OXYGEN SATURATION: 97 % | TEMPERATURE: 97.6 F | SYSTOLIC BLOOD PRESSURE: 122 MMHG | HEIGHT: 71 IN | RESPIRATION RATE: 19 BRPM | WEIGHT: 188.44 LBS

## 2019-01-07 DIAGNOSIS — J44.9 CHRONIC OBSTRUCTIVE PULMONARY DISEASE, UNSPECIFIED COPD TYPE (HCC): ICD-10-CM

## 2019-01-07 DIAGNOSIS — J43.9 NOCTURNAL HYPOXEMIA DUE TO EMPHYSEMA (HCC): ICD-10-CM

## 2019-01-07 DIAGNOSIS — K22.2 ESOPHAGEAL STENOSIS: ICD-10-CM

## 2019-01-07 DIAGNOSIS — J06.9 VIRAL URI: Primary | ICD-10-CM

## 2019-01-07 DIAGNOSIS — G47.36 NOCTURNAL HYPOXEMIA DUE TO EMPHYSEMA (HCC): ICD-10-CM

## 2019-01-07 DIAGNOSIS — J43.2 CENTRILOBULAR EMPHYSEMA (HCC): ICD-10-CM

## 2019-01-07 LAB
ALBUMIN SERPL-MCNC: 4.2 G/DL (ref 3.5–5.2)
ALP BLD-CCNC: 85 U/L (ref 40–129)
ALT SERPL-CCNC: 11 U/L (ref 0–40)
ANION GAP SERPL CALCULATED.3IONS-SCNC: 14 MMOL/L (ref 7–16)
AST SERPL-CCNC: 15 U/L (ref 0–39)
BASOPHILS ABSOLUTE: 0.1 E9/L (ref 0–0.2)
BASOPHILS RELATIVE PERCENT: 1.4 % (ref 0–2)
BILIRUB SERPL-MCNC: 0.2 MG/DL (ref 0–1.2)
BUN BLDV-MCNC: 12 MG/DL (ref 8–23)
CALCIUM SERPL-MCNC: 9.2 MG/DL (ref 8.6–10.2)
CHLORIDE BLD-SCNC: 101 MMOL/L (ref 98–107)
CO2: 24 MMOL/L (ref 22–29)
CREAT SERPL-MCNC: 0.9 MG/DL (ref 0.7–1.2)
EOSINOPHILS ABSOLUTE: 0.24 E9/L (ref 0.05–0.5)
EOSINOPHILS RELATIVE PERCENT: 3.3 % (ref 0–6)
GFR AFRICAN AMERICAN: >60
GFR NON-AFRICAN AMERICAN: >60 ML/MIN/1.73
GLUCOSE BLD-MCNC: 93 MG/DL (ref 74–99)
HCT VFR BLD CALC: 43.8 % (ref 37–54)
HEMOGLOBIN: 14.3 G/DL (ref 12.5–16.5)
IMMATURE GRANULOCYTES #: 0.01 E9/L
IMMATURE GRANULOCYTES %: 0.1 % (ref 0–5)
LYMPHOCYTES ABSOLUTE: 2.28 E9/L (ref 1.5–4)
LYMPHOCYTES RELATIVE PERCENT: 31 % (ref 20–42)
MCH RBC QN AUTO: 29.2 PG (ref 26–35)
MCHC RBC AUTO-ENTMCNC: 32.6 % (ref 32–34.5)
MCV RBC AUTO: 89.6 FL (ref 80–99.9)
MONOCYTES ABSOLUTE: 0.88 E9/L (ref 0.1–0.95)
MONOCYTES RELATIVE PERCENT: 12 % (ref 2–12)
NEUTROPHILS ABSOLUTE: 3.85 E9/L (ref 1.8–7.3)
NEUTROPHILS RELATIVE PERCENT: 52.2 % (ref 43–80)
PDW BLD-RTO: 14.5 FL (ref 11.5–15)
PLATELET # BLD: 397 E9/L (ref 130–450)
PMV BLD AUTO: 9.7 FL (ref 7–12)
POTASSIUM SERPL-SCNC: 4.8 MMOL/L (ref 3.5–5)
RBC # BLD: 4.89 E12/L (ref 3.8–5.8)
SODIUM BLD-SCNC: 139 MMOL/L (ref 132–146)
TOTAL PROTEIN: 7 G/DL (ref 6.4–8.3)
WBC # BLD: 7.4 E9/L (ref 4.5–11.5)

## 2019-01-07 PROCEDURE — 80053 COMPREHEN METABOLIC PANEL: CPT

## 2019-01-07 PROCEDURE — 99214 OFFICE O/P EST MOD 30 MIN: CPT | Performed by: NURSE PRACTITIONER

## 2019-01-07 PROCEDURE — 85025 COMPLETE CBC W/AUTO DIFF WBC: CPT

## 2019-01-07 RX ORDER — DOXYCYCLINE HYCLATE 100 MG/1
100 CAPSULE ORAL 2 TIMES DAILY
Qty: 20 CAPSULE | Refills: 0 | Status: SHIPPED | OUTPATIENT
Start: 2019-01-07 | End: 2019-01-17

## 2019-01-07 RX ORDER — OMEPRAZOLE 20 MG/1
20 CAPSULE, DELAYED RELEASE ORAL 2 TIMES DAILY
COMMUNITY
End: 2019-07-25 | Stop reason: ALTCHOICE

## 2019-01-07 RX ORDER — ALBUTEROL SULFATE 90 UG/1
2 AEROSOL, METERED RESPIRATORY (INHALATION) EVERY 6 HOURS PRN
Qty: 1 INHALER | Refills: 3 | Status: SHIPPED
Start: 2019-01-07 | End: 2020-10-30 | Stop reason: SDUPTHER

## 2019-01-07 RX ORDER — RAMIPRIL 2.5 MG/1
2.5 CAPSULE ORAL DAILY
COMMUNITY
End: 2021-11-01 | Stop reason: SDUPTHER

## 2019-01-07 ASSESSMENT — ENCOUNTER SYMPTOMS
BACK PAIN: 0
VOICE CHANGE: 0
RHINORRHEA: 0
ABDOMINAL PAIN: 0
SORE THROAT: 0
CHEST TIGHTNESS: 0
NAUSEA: 0
WHEEZING: 0
FACIAL SWELLING: 0
COUGH: 1
CONSTIPATION: 0
SINUS PAIN: 0
VOMITING: 0
COLOR CHANGE: 0
SHORTNESS OF BREATH: 0
TROUBLE SWALLOWING: 1
SINUS PRESSURE: 0
DIARRHEA: 0

## 2019-04-08 ENCOUNTER — OFFICE VISIT (OUTPATIENT)
Dept: FAMILY MEDICINE CLINIC | Age: 69
End: 2019-04-08
Payer: MEDICARE

## 2019-04-08 ENCOUNTER — HOSPITAL ENCOUNTER (OUTPATIENT)
Age: 69
Discharge: HOME | End: 2019-04-10
Payer: MEDICARE

## 2019-04-08 VITALS
HEIGHT: 71 IN | HEART RATE: 68 BPM | WEIGHT: 194.12 LBS | SYSTOLIC BLOOD PRESSURE: 130 MMHG | BODY MASS INDEX: 27.18 KG/M2 | RESPIRATION RATE: 16 BRPM | DIASTOLIC BLOOD PRESSURE: 78 MMHG | TEMPERATURE: 97 F | OXYGEN SATURATION: 98 %

## 2019-04-08 DIAGNOSIS — S30.861A TICK BITE OF ABDOMEN, INITIAL ENCOUNTER: ICD-10-CM

## 2019-04-08 DIAGNOSIS — Z13.31 SCREENING FOR DEPRESSION: ICD-10-CM
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
## 2019-04-08 DIAGNOSIS — G89.29 CHRONIC BILATERAL LOW BACK PAIN WITH LEFT-SIDED SCIATICA: ICD-10-CM

## 2019-04-08 DIAGNOSIS — M25.561 RIGHT ANTERIOR KNEE PAIN: Primary | ICD-10-CM

## 2019-04-08 DIAGNOSIS — M54.42 CHRONIC BILATERAL LOW BACK PAIN WITH LEFT-SIDED SCIATICA: ICD-10-CM

## 2019-04-08 PROCEDURE — G0444 DEPRESSION SCREEN ANNUAL: HCPCS | Performed by: NURSE PRACTITIONER

## 2019-04-08 PROCEDURE — 86618 LYME DISEASE ANTIBODY: CPT

## 2019-04-08 PROCEDURE — 99214 OFFICE O/P EST MOD 30 MIN: CPT | Performed by: NURSE PRACTITIONER

## 2019-04-08 RX ORDER — DOXYCYCLINE HYCLATE 100 MG
100 TABLET ORAL 2 TIMES DAILY
Qty: 14 TABLET | Refills: 0 | Status: SHIPPED | OUTPATIENT
Start: 2019-04-08 | End: 2019-04-15

## 2019-04-08 ASSESSMENT — ENCOUNTER SYMPTOMS
CHEST TIGHTNESS: 0
WHEEZING: 0
DIARRHEA: 0
FACIAL SWELLING: 0
CONSTIPATION: 0
VOICE CHANGE: 0
RHINORRHEA: 0
SINUS PRESSURE: 0
TROUBLE SWALLOWING: 0
BACK PAIN: 1
SORE THROAT: 0
COUGH: 0
COLOR CHANGE: 0
VOMITING: 0
NAUSEA: 0
ABDOMINAL PAIN: 0
SINUS PAIN: 0
SHORTNESS OF BREATH: 0

## 2019-04-08 ASSESSMENT — PATIENT HEALTH QUESTIONNAIRE - PHQ9
SUM OF ALL RESPONSES TO PHQ QUESTIONS 1-9: 0
SUM OF ALL RESPONSES TO PHQ9 QUESTIONS 1 & 2: 0
2. FEELING DOWN, DEPRESSED OR HOPELESS: 0
1. LITTLE INTEREST OR PLEASURE IN DOING THINGS: 0
SUM OF ALL RESPONSES TO PHQ QUESTIONS 1-9: 0

## 2019-04-08 NOTE — PROGRESS NOTES
OFFICE PROGRESS NOTE  [id number] [facility]  [place] [address]  Dept: [phone]   Chief Complaint   Patient presents with    3 Month Follow-Up    Knee Pain     right knee          HPI:     Knee Pain: Patient presents with knee pain and intermittent swelling involving the  right knee. Onset of the symptoms was 12 years ago he had an injury and tore the ACL, MCL but never had anything done to repair it. Over the last 3 weeks he has been having problems going down the stairs, he can't bend the knee completely. . Inciting event: injured while twisting years ago and has been inactive and has started to resume his activity . Current symptoms include pain located anterior knee cap, popping sensation, stiffness and swelling. Pain is aggravated by going down stairs. Patient has had prior knee problems. Evaluation to date: none. Treatment to date: elastic supporter, seeing a therapist doing something like Yoga which is somewhat effective . He is complaining of tingling in his legs off and on and has noticed much worse when he is more active, he does have problems with his low back with left sciatica off and on for many years. He sees a chiropractor every 2 weeks and hasn't had imaging for many years. Denies any red flag symptoms. He has noticed since he has been dealing with the esophageal stricture and hasn't been as active now he is feeling better and is trying to get back to where he was before all this started. He was also bitten by a tick a week ago, he was able to remove the tick then the next day got a pair of tweezers and was able to pull out the head. He is seeing DR Heydi Marinelli at [facility] for esophageal stricture and will be seeing cancer specialist 4/24/19 for evaluation. He is able to start to feel when he swallows as he can feel the peristalsis, still will get some sticking dysphagia if he isn't careful.      Current Outpatient Medications:  None       I have reviewed Tyrese's allergies, medications, problem list, medical, social and family history and have updated as needed in the electronic medical record    Review of Systems   Constitutional: Negative for activity change, appetite change, chills, diaphoresis, fatigue, fever and unexpected weight change. HENT: Negative for congestion, dental problem, drooling, ear discharge, ear pain, facial swelling, hearing loss, mouth sores, nosebleeds, postnasal drip, rhinorrhea, sinus pressure, sinus pain, sneezing, sore throat, tinnitus, trouble swallowing and voice change. Eyes: Negative for visual disturbance. Respiratory: Negative for cough, chest tightness, shortness of breath and wheezing. Cardiovascular: Negative for chest pain, palpitations and leg swelling. Gastrointestinal: Negative for abdominal pain, constipation, diarrhea, nausea and vomiting. Endocrine: Negative for cold intolerance, heat intolerance, polydipsia, polyphagia and polyuria. Genitourinary: Negative for difficulty urinating, frequency and urgency. Musculoskeletal: Positive for arthralgias, back pain, gait problem and joint swelling. Negative for myalgias, neck pain and neck stiffness. Skin: Negative for color change, pallor, rash and wound. Allergic/Immunologic: Negative for environmental allergies, food allergies and immunocompromised state. Neurological: Negative for dizziness, tremors, seizures, syncope, facial asymmetry, speech difficulty, weakness, light-headedness, numbness and headaches. Hematological: Negative for adenopathy. Does not bruise/bleed easily. Psychiatric/Behavioral: Negative for agitation, behavioral problems, confusion, decreased concentration, dysphoric mood, hallucinations, self-injury, sleep disturbance and suicidal ideas. The patient is not nervous/anxious and is not hyperactive.         OBJECTIVE:     VS:  Wt Readings from Last 3 Encounters:   04/08/19 194 lb 1.9 oz (88.1 kg) 01/07/19 188 lb 7 oz (85.5 kg)   10/05/18 185 lb 12.8 oz (84.3 kg)                       Vitals:    04/08/19 0838   BP: 130/78   Pulse: 68   Resp: 16   Temp: 97 °F (36.1 °C)   TempSrc: Temporal   SpO2: 98%   Weight: 194 lb 1.9 oz (88.1 kg)   Height: 5' 11\" (1.803 m)       General: Alert and oriented to person, place, and time, well developed and well nourished, in no acute distress  SKIN: Warm and dry, intact without any rash, masses or lesions, He has a small scabbed area to the left lower abdomen at the site of the tick bite. HEAD: normocephalic, atraumatic  Eyes: sclera/conjunctiva clear, PERRLA, EOMI's intact  ENT: tympanic membranes, external ear and ear canal normal bilaterally, normal hearing, Nose without deformity, nasal mucosa and turbinates normal without polyps   Throat: clear, tongue midline, no drainage, no masses or lesions noted, good dentition  Neck: supple and non-tender without mass, trachea midline, no cervical lymphadenopathy, no bruit, no thyromegaly or nodules  Cardiovascular: regular rate and regular rhythm, normal S1 and S2,  no murmurs, rubs, clicks, or gallop. Distal pulses intact, no carotid bruits. No edema  Pulmonary/Chest: clear to auscultation bilaterally, no wheezes, rales or rhonchi, normal air movement, no respiratory distress  Abdomen: soft, non-tender, non-distended, normal bowel sounds, no masses or hepatosplenomegaly  Musculoskeletal: Limied ROM on flexion of right knee, no crepitus, no joint swelling, deformity or tenderness. ROM in back is normal, no tenderness to palpation of the spine. Neurologic: reflexes normal and symmetric, no cranial nerve deficit, gait, coordination and speech normal  Extremities: no clubbing, cyanosis, or edema. Psychiatric: Good eye contact, normal mood and affect, answers questions appropriately    ASSESSMENT/PLAN   Opal Valverde was seen today for 3 month follow-up and knee pain.     Diagnoses and all orders for this visit:    Right anterior knee pain New  -     XR KNEE RIGHT (3 VIEWS); Future  Continue with chiropractor discussed can be related to his back will check X ray. I will call him with results  LOCATION: 200       EXAM: XR KNEE RIGHT (3 VIEWS)       COMPARISON: None       HISTORY: Chronic knee pain       TECHNIQUE: 3 views of the right knee.         FINDINGS: Mild tricompartmental degenerative changes are noted,   greatest in the medial compartment. There is no joint effusion. There   is normal alignment. The patella is well located.  No fractures are   identified.           Impression   1.  Mild degenerative changes as above. 2.  No acute findings. Chronic bilateral low back pain with left-sided sciatica New  -     Cancel: XR LUMBAR SPINE (2-3 VIEWS); Future  -     XR LUMBAR SPINE (MIN 4 VIEWS); Future  LOCATION:200       EXAM: XR LUMBAR SPINE (MIN 4 VIEWS)       COMPARISON: None       HISTORY:  Low back pain       TECHNIQUE:  4 views of the lumbar spine were obtained.        FINDINGS:  There is normal vertebral body height and alignment. Moderate to severe degenerative changes with facet arthropathy,   marginal osteophytes and disc space narrowing noted greatest at L3-4. Osseous structures are otherwise normal without evidence of fracture.           Impression   Moderate to severe degenerative changes. Tick bite of abdomen, initial encounter New  -     Lyme Disease Acute Reflexive Panel; Future  RX doxycycline 100 mg #14 one po BID     Screening for depression  -     [id number] [facility] again today to stop smoking with his history of adenocarcinoma of the colon and the problems with his esophagus. He states he does what he wants and smiles. Return in about 2 weeks (around 4/22/2019) for tick bite.      Discussed smoking cessation, Discussed exercising 30 minutes daily and Discussed taking medications as directed and adverse effects        I have reviewed my findings and recommendations with [place] Lax Pari Onofre.     Drucella Boast, NP-C, FNP-BC

## 2019-04-11 LAB — LYME, EIA: 0.54 LIV (ref 0–1.2)

## 2019-04-25 ENCOUNTER — OFFICE VISIT (OUTPATIENT)
Dept: FAMILY MEDICINE CLINIC | Age: 69
End: 2019-04-25
Payer: MEDICARE

## 2019-04-25 VITALS
WEIGHT: 194.31 LBS | TEMPERATURE: 98.5 F | BODY MASS INDEX: 27.2 KG/M2 | HEART RATE: 94 BPM | SYSTOLIC BLOOD PRESSURE: 126 MMHG | RESPIRATION RATE: 17 BRPM | HEIGHT: 71 IN | DIASTOLIC BLOOD PRESSURE: 76 MMHG | OXYGEN SATURATION: 94 %

## 2019-04-25 DIAGNOSIS — I71.40 ABDOMINAL AORTIC ANEURYSM (AAA) WITHOUT RUPTURE: ICD-10-CM
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
## 2019-04-25 DIAGNOSIS — S30.861D TICK BITE OF ABDOMEN, SUBSEQUENT ENCOUNTER: Primary | ICD-10-CM

## 2019-04-25 DIAGNOSIS — K22.2 ESOPHAGEAL STENOSIS: ICD-10-CM

## 2019-04-25 PROCEDURE — 99213 OFFICE O/P EST LOW 20 MIN: CPT | Performed by: NURSE PRACTITIONER

## 2019-04-25 ASSESSMENT — ENCOUNTER SYMPTOMS
DIARRHEA: 0
BACK PAIN: 0
SINUS PRESSURE: 0
RHINORRHEA: 0
SINUS PAIN: 0
COLOR CHANGE: 0
ABDOMINAL PAIN: 0
WHEEZING: 0
VOMITING: 0
CONSTIPATION: 0
COUGH: 0
CHEST TIGHTNESS: 0
SORE THROAT: 0
TROUBLE SWALLOWING: 1
FACIAL SWELLING: 0
NAUSEA: 0
VOICE CHANGE: 0
SHORTNESS OF BREATH: 0

## 2019-04-25 NOTE — PROGRESS NOTES
Never Used   Substance and Sexual Activity    Alcohol use: Yes     Comment: Social    Drug use: No    Sexual activity: None   Lifestyle    Physical activity:     Days per week: None     Minutes per session: None    Stress: None   Relationships    Social connections:     Talks on phone: None     Gets together: None     Attends Restorationism service: None     Active member of club or organization: None     Attends meetings of clubs or organizations: None     Relationship status: None    Intimate partner violence:     Fear of current or ex partner: None     Emotionally abused: None     Physically abused: None     Forced sexual activity: None   Other Topics Concern    None   Social History Narrative    None       I have reviewed Tyrese's allergies, medications, problem list, medical, social and family history and have updated as needed in the electronic medical record    Review of Systems   Constitutional: Negative for activity change, appetite change, chills, diaphoresis, fatigue, fever and unexpected weight change. HENT: Positive for trouble swallowing. Negative for congestion, dental problem, drooling, ear discharge, ear pain, facial swelling, hearing loss, mouth sores, nosebleeds, postnasal drip, rhinorrhea, sinus pressure, sinus pain, sneezing, sore throat, tinnitus and voice change. Eyes: Negative for visual disturbance. Respiratory: Negative for cough, chest tightness, shortness of breath and wheezing. Cardiovascular: Negative for chest pain, palpitations and leg swelling. Gastrointestinal: Negative for abdominal pain, constipation, diarrhea, nausea and vomiting. Endocrine: Negative for cold intolerance, heat intolerance, polydipsia, polyphagia and polyuria. Genitourinary: Negative for difficulty urinating, frequency and urgency. Musculoskeletal: Negative for arthralgias, back pain, gait problem, joint swelling, myalgias, neck pain and neck stiffness.    Skin: Negative for color change,

## 2019-05-02 ENCOUNTER — TELEPHONE (OUTPATIENT)
Dept: FAMILY MEDICINE CLINIC | Age: 69
End: 2019-05-02

## 2019-05-03 ENCOUNTER — TELEPHONE (OUTPATIENT)
Dept: FAMILY MEDICINE CLINIC | Age: 69
End: 2019-05-03

## 2019-05-03 NOTE — TELEPHONE ENCOUNTER
Spoke with Dr Maria C Silvestre and pascale found in report he is making an Addendum.      Please check on this as I haven't gotten a new report on his retroperitoneal US

## 2019-05-22 ENCOUNTER — TELEPHONE (OUTPATIENT)
Dept: FAMILY MEDICINE CLINIC | Age: 69
End: 2019-05-22

## 2019-05-22 NOTE — TELEPHONE ENCOUNTER
Can you call radiology there was supposed to be an addendum to his aortic US report and I never received it.

## 2019-07-25 ENCOUNTER — HOSPITAL ENCOUNTER (OUTPATIENT)
Age: 69
Discharge: HOME | End: 2019-07-27
Payer: MEDICARE

## 2019-07-25 ENCOUNTER — OFFICE VISIT (OUTPATIENT)
Dept: FAMILY MEDICINE CLINIC | Age: 69
End: 2019-07-25
Payer: MEDICARE

## 2019-07-25 VITALS
DIASTOLIC BLOOD PRESSURE: 68 MMHG | WEIGHT: 197 LBS | TEMPERATURE: 97.8 F | HEART RATE: 63 BPM | HEIGHT: 72 IN | BODY MASS INDEX: 26.68 KG/M2 | SYSTOLIC BLOOD PRESSURE: 112 MMHG | OXYGEN SATURATION: 95 %

## 2019-07-25 DIAGNOSIS — C18.9 ADENOCARCINOMA OF COLON (HCC): ICD-10-CM

## 2019-07-25 DIAGNOSIS — K22.70 BARRETT'S ESOPHAGUS DETERMINED BY ENDOSCOPY: ICD-10-CM

## 2019-07-25 DIAGNOSIS — K22.2 ESOPHAGEAL STENOSIS: Primary | ICD-10-CM

## 2019-07-25 LAB
ALBUMIN SERPL-MCNC: 4.5 G/DL (ref 3.5–5.2)
ALP BLD-CCNC: 86 U/L (ref 40–129)
ALT SERPL-CCNC: 16 U/L (ref 0–40)
ANION GAP SERPL CALCULATED.3IONS-SCNC: 14 MMOL/L (ref 7–16)
AST SERPL-CCNC: 18 U/L (ref 0–39)
BASOPHILS ABSOLUTE: 0.1 E9/L (ref 0–0.2)
BASOPHILS RELATIVE PERCENT: 1.3 % (ref 0–2)
BILIRUB SERPL-MCNC: 0.3 MG/DL (ref 0–1.2)
BUN BLDV-MCNC: 13 MG/DL (ref 8–23)
CALCIUM SERPL-MCNC: 9.4 MG/DL (ref 8.6–10.2)
CEA: 3.3 NG/ML (ref 0–5.2)
CHLORIDE BLD-SCNC: 102 MMOL/L (ref 98–107)
CO2: 23 MMOL/L (ref 22–29)
CREAT SERPL-MCNC: 1.1 MG/DL (ref 0.7–1.2)
EOSINOPHILS ABSOLUTE: 0.14 E9/L (ref 0.05–0.5)
EOSINOPHILS RELATIVE PERCENT: 1.8 % (ref 0–6)
GFR AFRICAN AMERICAN: >60
GFR NON-AFRICAN AMERICAN: >60 ML/MIN/1.73
GLUCOSE BLD-MCNC: 65 MG/DL (ref 74–99)
HCT VFR BLD CALC: 46.3 % (ref 37–54)
HEMOGLOBIN: 15 G/DL (ref 12.5–16.5)
IMMATURE GRANULOCYTES #: 0.02 E9/L
IMMATURE GRANULOCYTES %: 0.3 % (ref 0–5)
LYMPHOCYTES ABSOLUTE: 2.76 E9/L (ref 1.5–4)
LYMPHOCYTES RELATIVE PERCENT: 34.8 % (ref 20–42)
MCH RBC QN AUTO: 28.9 PG (ref 26–35)
MCHC RBC AUTO-ENTMCNC: 32.4 % (ref 32–34.5)
MCV RBC AUTO: 89.2 FL (ref 80–99.9)
MONOCYTES ABSOLUTE: 1.29 E9/L (ref 0.1–0.95)
MONOCYTES RELATIVE PERCENT: 16.3 % (ref 2–12)
NEUTROPHILS ABSOLUTE: 3.62 E9/L (ref 1.8–7.3)
NEUTROPHILS RELATIVE PERCENT: 45.5 % (ref 43–80)
PDW BLD-RTO: 14.8 FL (ref 11.5–15)
PLATELET # BLD: 380 E9/L (ref 130–450)
PMV BLD AUTO: 9.5 FL (ref 7–12)
POTASSIUM SERPL-SCNC: 4.4 MMOL/L (ref 3.5–5)
RBC # BLD: 5.19 E12/L (ref 3.8–5.8)
SODIUM BLD-SCNC: 139 MMOL/L (ref 132–146)
TOTAL PROTEIN: 7.2 G/DL (ref 6.4–8.3)
WBC # BLD: 7.9 E9/L (ref 4.5–11.5)

## 2019-07-25 PROCEDURE — 36415 COLL VENOUS BLD VENIPUNCTURE: CPT

## 2019-07-25 PROCEDURE — 85025 COMPLETE CBC W/AUTO DIFF WBC: CPT

## 2019-07-25 PROCEDURE — 99214 OFFICE O/P EST MOD 30 MIN: CPT | Performed by: NURSE PRACTITIONER

## 2019-07-25 PROCEDURE — 80053 COMPREHEN METABOLIC PANEL: CPT

## 2019-07-25 PROCEDURE — 82378 CARCINOEMBRYONIC ANTIGEN: CPT

## 2019-07-25 RX ORDER — PANTOPRAZOLE SODIUM 40 MG/1
40 TABLET, DELAYED RELEASE ORAL 2 TIMES DAILY
COMMUNITY
Start: 2019-06-07 | End: 2019-07-25 | Stop reason: SDUPTHER

## 2019-07-25 RX ORDER — PANTOPRAZOLE SODIUM 40 MG/1
40 TABLET, DELAYED RELEASE ORAL 2 TIMES DAILY
Qty: 60 TABLET | Refills: 11 | Status: SHIPPED
Start: 2019-07-25 | End: 2020-07-23 | Stop reason: SDUPTHER

## 2019-07-25 ASSESSMENT — ENCOUNTER SYMPTOMS
CHEST TIGHTNESS: 0
FACIAL SWELLING: 0
SINUS PAIN: 0
SORE THROAT: 0
VOMITING: 0
WHEEZING: 0
ABDOMINAL PAIN: 0
RHINORRHEA: 0
BACK PAIN: 0
VOICE CHANGE: 0
CONSTIPATION: 0
SHORTNESS OF BREATH: 0
NAUSEA: 0
COLOR CHANGE: 0
COUGH: 0
SINUS PRESSURE: 0
DIARRHEA: 0
TROUBLE SWALLOWING: 0

## 2019-10-16 ENCOUNTER — TELEPHONE (OUTPATIENT)
Dept: FAMILY MEDICINE CLINIC | Age: 69
End: 2019-10-16

## 2019-10-21 ENCOUNTER — OFFICE VISIT (OUTPATIENT)
Dept: FAMILY MEDICINE CLINIC | Age: 69
End: 2019-10-21
Payer: MEDICARE

## 2019-10-21 VITALS
HEART RATE: 65 BPM | SYSTOLIC BLOOD PRESSURE: 114 MMHG | WEIGHT: 201 LBS | RESPIRATION RATE: 18 BRPM | TEMPERATURE: 97.4 F | BODY MASS INDEX: 27.22 KG/M2 | HEIGHT: 72 IN | DIASTOLIC BLOOD PRESSURE: 72 MMHG | OXYGEN SATURATION: 95 %

## 2019-10-21 DIAGNOSIS — C18.9 COLON ADENOCARCINOMA (HCC): ICD-10-CM

## 2019-10-21 DIAGNOSIS — E78.2 ELEVATED TRIGLYCERIDES WITH HIGH CHOLESTEROL: ICD-10-CM

## 2019-10-21 DIAGNOSIS — Z12.5 SCREENING PSA (PROSTATE SPECIFIC ANTIGEN): ICD-10-CM

## 2019-10-21 DIAGNOSIS — I10 ESSENTIAL HYPERTENSION: ICD-10-CM

## 2019-10-21 DIAGNOSIS — Z00.00 ROUTINE GENERAL MEDICAL EXAMINATION AT A HEALTH CARE FACILITY: Primary | ICD-10-CM

## 2019-10-21 DIAGNOSIS — Z23 FLU VACCINE NEED: ICD-10-CM

## 2019-10-21 DIAGNOSIS — Z87.891 PERSONAL HISTORY OF TOBACCO USE, PRESENTING HAZARDS TO HEALTH: ICD-10-CM

## 2019-10-21 PROCEDURE — 90653 IIV ADJUVANT VACCINE IM: CPT | Performed by: NURSE PRACTITIONER

## 2019-10-21 PROCEDURE — 99406 BEHAV CHNG SMOKING 3-10 MIN: CPT | Performed by: NURSE PRACTITIONER

## 2019-10-21 PROCEDURE — G0439 PPPS, SUBSEQ VISIT: HCPCS | Performed by: NURSE PRACTITIONER

## 2019-10-21 PROCEDURE — G0008 ADMIN INFLUENZA VIRUS VAC: HCPCS | Performed by: NURSE PRACTITIONER

## 2019-10-21 ASSESSMENT — LIFESTYLE VARIABLES
HAVE YOU OR SOMEONE ELSE BEEN INJURED AS A RESULT OF YOUR DRINKING: 0
AUDIT TOTAL SCORE: 1
HOW OFTEN DURING THE LAST YEAR HAVE YOU NEEDED AN ALCOHOLIC DRINK FIRST THING IN THE MORNING TO GET YOURSELF GOING AFTER A NIGHT OF HEAVY DRINKING: 0
AUDIT-C TOTAL SCORE: 1
HOW OFTEN DO YOU HAVE SIX OR MORE DRINKS ON ONE OCCASION: 0
HAS A RELATIVE, FRIEND, DOCTOR, OR ANOTHER HEALTH PROFESSIONAL EXPRESSED CONCERN ABOUT YOUR DRINKING OR SUGGESTED YOU CUT DOWN: 0
HOW OFTEN DURING THE LAST YEAR HAVE YOU FOUND THAT YOU WERE NOT ABLE TO STOP DRINKING ONCE YOU HAD STARTED: 0
HOW OFTEN DO YOU HAVE A DRINK CONTAINING ALCOHOL: 1
HOW MANY STANDARD DRINKS CONTAINING ALCOHOL DO YOU HAVE ON A TYPICAL DAY: 0
HOW OFTEN DURING THE LAST YEAR HAVE YOU FAILED TO DO WHAT WAS NORMALLY EXPECTED FROM YOU BECAUSE OF DRINKING: 0
HOW OFTEN DURING THE LAST YEAR HAVE YOU BEEN UNABLE TO REMEMBER WHAT HAPPENED THE NIGHT BEFORE BECAUSE YOU HAD BEEN DRINKING: 0
HOW OFTEN DURING THE LAST YEAR HAVE YOU HAD A FEELING OF GUILT OR REMORSE AFTER DRINKING: 0

## 2019-10-21 ASSESSMENT — PATIENT HEALTH QUESTIONNAIRE - PHQ9
SUM OF ALL RESPONSES TO PHQ QUESTIONS 1-9: 0
SUM OF ALL RESPONSES TO PHQ QUESTIONS 1-9: 0

## 2019-10-22 ENCOUNTER — TELEPHONE (OUTPATIENT)
Dept: FAMILY MEDICINE CLINIC | Age: 69
End: 2019-10-22

## 2020-01-10 PROBLEM — I38 VALVULAR HEART DISEASE: Status: ACTIVE | Noted: 2020-01-10

## 2020-01-10 PROBLEM — I27.20 PULMONARY HYPERTENSION (HCC): Status: ACTIVE | Noted: 2020-01-10

## 2020-01-16 ENCOUNTER — HOSPITAL ENCOUNTER (OUTPATIENT)
Age: 70
Discharge: HOME | End: 2020-01-18
Payer: MEDICARE

## 2020-01-16 LAB
ALBUMIN SERPL-MCNC: 4.3 G/DL (ref 3.5–5.2)
ALP BLD-CCNC: 74 U/L (ref 40–129)
ALT SERPL-CCNC: 12 U/L (ref 0–40)
ANION GAP SERPL CALCULATED.3IONS-SCNC: 16 MMOL/L (ref 7–16)
AST SERPL-CCNC: 15 U/L (ref 0–39)
BASOPHILS ABSOLUTE: 0.09 E9/L (ref 0–0.2)
BASOPHILS RELATIVE PERCENT: 1.4 % (ref 0–2)
BILIRUB SERPL-MCNC: 0.4 MG/DL (ref 0–1.2)
BUN BLDV-MCNC: 14 MG/DL (ref 8–23)
CALCIUM SERPL-MCNC: 9.4 MG/DL (ref 8.6–10.2)
CEA: 3.4 NG/ML (ref 0–5.2)
CHLORIDE BLD-SCNC: 100 MMOL/L (ref 98–107)
CHOLESTEROL, TOTAL: 180 MG/DL (ref 0–199)
CO2: 24 MMOL/L (ref 22–29)
CREAT SERPL-MCNC: 1.1 MG/DL (ref 0.7–1.2)
EOSINOPHILS ABSOLUTE: 0.17 E9/L (ref 0.05–0.5)
EOSINOPHILS RELATIVE PERCENT: 2.7 % (ref 0–6)
GFR AFRICAN AMERICAN: >60
GFR NON-AFRICAN AMERICAN: >60 ML/MIN/1.73
GLUCOSE BLD-MCNC: 100 MG/DL (ref 74–99)
HCT VFR BLD CALC: 46.8 % (ref 37–54)
HDLC SERPL-MCNC: 42 MG/DL
HEMOGLOBIN: 14.6 G/DL (ref 12.5–16.5)
IMMATURE GRANULOCYTES #: 0.02 E9/L
IMMATURE GRANULOCYTES %: 0.3 % (ref 0–5)
LDL CHOLESTEROL CALCULATED: 119 MG/DL (ref 0–99)
LYMPHOCYTES ABSOLUTE: 2.09 E9/L (ref 1.5–4)
LYMPHOCYTES RELATIVE PERCENT: 33.1 % (ref 20–42)
MCH RBC QN AUTO: 28.4 PG (ref 26–35)
MCHC RBC AUTO-ENTMCNC: 31.2 % (ref 32–34.5)
MCV RBC AUTO: 91.1 FL (ref 80–99.9)
MONOCYTES ABSOLUTE: 0.9 E9/L (ref 0.1–0.95)
MONOCYTES RELATIVE PERCENT: 14.3 % (ref 2–12)
NEUTROPHILS ABSOLUTE: 3.04 E9/L (ref 1.8–7.3)
NEUTROPHILS RELATIVE PERCENT: 48.2 % (ref 43–80)
PDW BLD-RTO: 14.5 FL (ref 11.5–15)
PLATELET # BLD: 373 E9/L (ref 130–450)
PMV BLD AUTO: 9.6 FL (ref 7–12)
POTASSIUM SERPL-SCNC: 4.4 MMOL/L (ref 3.5–5)
PROSTATE SPECIFIC ANTIGEN: 1.65 NG/ML (ref 0–4)
RBC # BLD: 5.14 E12/L (ref 3.8–5.8)
SODIUM BLD-SCNC: 140 MMOL/L (ref 132–146)
TOTAL PROTEIN: 6.8 G/DL (ref 6.4–8.3)
TRIGL SERPL-MCNC: 94 MG/DL (ref 0–149)
VLDLC SERPL CALC-MCNC: 19 MG/DL
WBC # BLD: 6.3 E9/L (ref 4.5–11.5)

## 2020-01-16 PROCEDURE — 82378 CARCINOEMBRYONIC ANTIGEN: CPT

## 2020-01-16 PROCEDURE — 85025 COMPLETE CBC W/AUTO DIFF WBC: CPT

## 2020-01-16 PROCEDURE — 36415 COLL VENOUS BLD VENIPUNCTURE: CPT

## 2020-01-16 PROCEDURE — 80061 LIPID PANEL: CPT

## 2020-01-16 PROCEDURE — G0103 PSA SCREENING: HCPCS

## 2020-01-16 PROCEDURE — 80053 COMPREHEN METABOLIC PANEL: CPT

## 2020-01-21 ENCOUNTER — OFFICE VISIT (OUTPATIENT)
Dept: FAMILY MEDICINE CLINIC | Age: 70
End: 2020-01-21
Payer: MEDICARE

## 2020-01-21 VITALS
DIASTOLIC BLOOD PRESSURE: 70 MMHG | SYSTOLIC BLOOD PRESSURE: 110 MMHG | OXYGEN SATURATION: 96 % | RESPIRATION RATE: 18 BRPM | HEIGHT: 72 IN | HEART RATE: 59 BPM | BODY MASS INDEX: 28.12 KG/M2 | WEIGHT: 207.6 LBS

## 2020-01-21 PROBLEM — I77.89 ENLARGED AORTA (HCC): Status: ACTIVE | Noted: 2020-01-21
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
## 2020-01-21 PROBLEM — Z90.81 HX OF SPLENECTOMY: Status: RESOLVED | Noted: 2017-06-13 | Resolved: 2020-01-21

## 2020-01-21 PROBLEM — C18.9 COLON ADENOCARCINOMA (HCC): Status: RESOLVED | Noted: 2018-01-30 | Resolved: 2020-01-21

## 2020-01-21 PROBLEM — S30.861A TICK BITE OF ABDOMEN: Status: RESOLVED | Noted: 2019-04-08 | Resolved: 2020-01-21

## 2020-01-21 PROBLEM — K63.5 POLYP OF COLON: Status: RESOLVED | Noted: 2017-12-15 | Resolved: 2020-01-21

## 2020-01-21 PROCEDURE — 99214 OFFICE O/P EST MOD 30 MIN: CPT | Performed by: NURSE PRACTITIONER

## 2020-01-21 ASSESSMENT — ENCOUNTER SYMPTOMS
FACIAL SWELLING: 0
ABDOMINAL PAIN: 0
SINUS PRESSURE: 0
SHORTNESS OF BREATH: 0
COLOR CHANGE: 0
COUGH: 0
TROUBLE SWALLOWING: 0
WHEEZING: 0
DIARRHEA: 0
CHEST TIGHTNESS: 0
NAUSEA: 0
SINUS PAIN: 0
VOICE CHANGE: 0
SORE THROAT: 0
BACK PAIN: 0
CONSTIPATION: 0
RHINORRHEA: 0
VOMITING: 0

## 2020-01-21 ASSESSMENT — PATIENT HEALTH QUESTIONNAIRE - PHQ9
SUM OF ALL RESPONSES TO PHQ QUESTIONS 1-9: 0
1. LITTLE INTEREST OR PLEASURE IN DOING THINGS: 0
2. FEELING DOWN, DEPRESSED OR HOPELESS: 0
SUM OF ALL RESPONSES TO PHQ9 QUESTIONS 1 & 2: 0
SUM OF ALL RESPONSES TO PHQ QUESTIONS 1-9: 0

## 2020-01-21 NOTE — PROGRESS NOTES
presents with hyperlipidemia. He was tested because HTN, hyperlipidemia. His last labs showed Total cholesterol of 180, HDL 42, ,  Triglycerides 94. Denies chest pain, dyspnea, exertional chest pressure/discomfort, fatigue, feeding intolerance, lower extremity edema, palpitations, poor exercise tolerance, syncope, tachypnea and skin xanthelasma. There is not a family history of hyperlipidemia. There is not a family history of early ischemia heart disease. Lab Results   Component Value Date    CHOL 180 01/16/2020    CHOL 186 06/25/2018    CHOL 165 01/19/2017     Lab Results   Component Value Date    TRIG 94 01/16/2020    TRIG 106 06/25/2018    TRIG 124 01/19/2017     Lab Results   Component Value Date    HDL 42 01/16/2020    HDL 42 06/25/2018    HDL 39 01/19/2017     Lab Results   Component Value Date    LDLCALC 119 (H) 01/16/2020    LDLCALC 123 (H) 06/25/2018    LDLCALC 101 (H) 01/19/2017     Lab Results   Component Value Date    LABVLDL 19 01/16/2020    LABVLDL 21 06/25/2018    LABVLDL 25 01/19/2017     Lab Results   Component Value Date    PSA 1.65 01/16/2020    PSA 1.26 10/05/2018    PSA 1.69 01/19/2017     Lab Results   Component Value Date    CEA 3.4 01/16/2020     He will be getting his low dose lung cancer screening done in March and has appt with Dr Leida Mcqueen pulmonologist scheduled after. I did fax his echo to Dr Leida Mcqueen with the pulmonary hypertension. He had colonoscopy done at [facility] 12/5/19 discussed he should have an annual FIT test in between his colonoscopies. This one clean and no polyps found would recommend he get screened every 3 years.      [facility]  Gastrointestinal Endoscopy  Patient Name: Jad King  Procedure Date: 12/5/2019 11:26 AM  MRN: [id number]  YOB: 1950  Admit Type: Outpatient  Age: 71  Room: A31 Procedure 10 (A3-205)  Gender: Male  Note Status: Finalized  Attending MD: Doris Savage MD  Sedation Initiated: 11:42 AM  Procedure:            Colonoscopy  Indications:          High risk colon cancer surveillance: Personal                        history of colon cancer  Providers:           Clay Jacobs MD  Patient Profile:      This is a 71year old male. Refer to note in patient                        chart for documentation of history and physical.                        Last Colonoscopy:  Referring Physician:  Medicines:            See the other procedure note for documentation of                        the administered medications  Complications:        No immediate complications. Requesting Provider:  Procedure:            Pre-Anesthesia Assessment:                        - Prior to the procedure, a History and Physical was                        performed, and patient medications and allergies                        were reviewed. The patient is competent. The risks                        and benefits of the procedure and the sedation                        options and risks were discussed with the patient.                        All questions were answered and informed consent was                        obtained. Patient identification and proposed                        procedure were verified by the physician in the                        procedure room. Mental Status Examination: alert and                        oriented. Airway Examination: normal oropharyngeal                        airway and neck mobility. Respiratory Examination:                        clear to auscultation. CV Examination: normal.                        Prophylactic Antibiotics: The patient does not                        require prophylactic antibiotics. Prior                        Anticoagulants: The patient has taken no previous                        anticoagulant or antiplatelet agents. ASA Grade                        Assessment: II - A patient with mild systemic                        disease.  After reviewing the risks and benefits, the                        patient was deemed in (ambulatory).                      - Patient has a contact number available for                        emergencies. The signs and symptoms of potential                        delayed complications were discussed with the                        patient. Return to normal activities tomorrow.                        Written discharge instructions were provided to the                        patient.                        - Continue present medications.                        - Resume previous diet today.                        - Repeat colonoscopy (date not yet determined) for                        surveillance. Attending Participation:       I personally performed the entire procedure. Scope In: 12:03:34 PM  Scope Out: 12:12:44 PM  MD Marichuy Dutta MD  12/5/2019 12:17:13 PM  This report has been signed electronically by Marichuy Braldey MD  Number of Addenda: 0  Note Initiated On: 12/5/2019 11:26 AM       He will be due for AAA screening for aneurysmal dilation of the aorta in April will order today.          Current Outpatient Medications:     pantoprazole (PROTONIX) 40 MG tablet, Take 1 tablet by mouth 2 times daily, Disp: 60 tablet, Rfl: 11    ramipril (ALTACE) 2.5 MG capsule, Take 2.5 mg by mouth daily, Disp: , Rfl:     albuterol sulfate HFA (PROAIR HFA) 108 (90 Base) MCG/ACT inhaler, Inhale 2 puffs into the lungs every 6 hours as needed for Wheezing, Disp: 1 Inhaler, Rfl: 3    ANORO ELLIPTA 62.5-25 MCG/INH AEPB inhaler, Inhale 1 puff into the lungs daily One puff inhalation daily, Disp: , Rfl:   Social History     Socioeconomic History    Marital status: Single     Spouse name: None    Number of children: None    Years of education: None    Highest education level: None   Occupational History    None   Social Needs    Financial resource strain: None    Food insecurity:     Worry: None     Inability: None    Transportation needs:     Medical: None     Non-medical: None   Tobacco Use    edema  Pulmonary/Chest: clear to auscultation bilaterally, no wheezes, rales or rhonchi, normal air movement, no respiratory distress  Abdomen: soft, non-tender, non-distended, normal bowel sounds, no masses or hepatosplenomegaly  Musculoskeletal: Normal ROM, no joint swelling, deformity or tenderness   Neurologic: reflexes normal and symmetric, no cranial nerve deficit, gait, coordination and speech normal  Extremities: no clubbing, cyanosis, or edema. Psychiatric: Good eye contact, normal mood and affect, answers questions appropriately    ASSESSMENT/PLAN   Laura Edmondson was seen today for discuss labs, hypertension and hyperlipidemia. Diagnoses and all orders for this visit:    Essential hypertension stable  Continue Ramipril as directed  -Discussed taking medication and directed every day. -Discussed exercising daily 30 minutes 5 times a week for 150 minutes weekly.  -Discussed weight reduction if needed.  -Discussed low sodium diet.  -Discussed limiting caffeine consumption and tobacco cessation and the effects they have on the heart and blood pressure. Elevated triglycerides with high cholesterol Resolved  -Discussed low fat diet, limit fast food, goodies, breads and pastas if consuming several days a week,  limit any alcohol consumption.  -Discussed weight reduction and exercise 30 minutes 5 days a week for total of 150 minutes weekly. Enlarged aorta (HCC) stable  -     US Retroperitoneal Limited; Future      Discussed with his history of colon cancer would recommend FIT test annually and colonoscopy every 3 years. Return in about 6 months (around 7/21/2020) for HTN. Discussed reducing caffeine intake, Discussed smoking cessation, Discussed weight loss, Discussed exercising 30 minutes daily and Discussed taking medications as directed and adverse effects        I have reviewed my findings and recommendations with Alma Delia Albert.     Russ Adam, NP-C, FNP-BC

## 2020-06-24 ENCOUNTER — TELEPHONE (OUTPATIENT)
Dept: FAMILY MEDICINE CLINIC | Age: 70
End: 2020-06-24

## 2020-07-23 ENCOUNTER — OFFICE VISIT (OUTPATIENT)
Dept: FAMILY MEDICINE CLINIC | Age: 70
End: 2020-07-23
Payer: MEDICARE

## 2020-07-23 VITALS
OXYGEN SATURATION: 99 % | HEART RATE: 62 BPM | WEIGHT: 195 LBS | TEMPERATURE: 96.8 F | SYSTOLIC BLOOD PRESSURE: 118 MMHG | DIASTOLIC BLOOD PRESSURE: 80 MMHG | HEIGHT: 72 IN | BODY MASS INDEX: 26.41 KG/M2 | RESPIRATION RATE: 18 BRPM

## 2020-07-23 PROCEDURE — 99213 OFFICE O/P EST LOW 20 MIN: CPT | Performed by: NURSE PRACTITIONER

## 2020-07-23 RX ORDER — PANTOPRAZOLE SODIUM 40 MG/1
40 TABLET, DELAYED RELEASE ORAL 2 TIMES DAILY
Qty: 60 TABLET | Refills: 11 | Status: SHIPPED
Start: 2020-07-23 | End: 2021-11-01

## 2020-07-23 ASSESSMENT — ENCOUNTER SYMPTOMS
NAUSEA: 0
ABDOMINAL PAIN: 0
COLOR CHANGE: 0
TROUBLE SWALLOWING: 1
SINUS PRESSURE: 0
RHINORRHEA: 0
SINUS PAIN: 0
DIARRHEA: 0
COUGH: 0
WHEEZING: 0
SORE THROAT: 0
CHEST TIGHTNESS: 0
VOICE CHANGE: 0
BACK PAIN: 0
CONSTIPATION: 0
VOMITING: 0
FACIAL SWELLING: 0
SHORTNESS OF BREATH: 0

## 2020-07-23 NOTE — PROGRESS NOTES
OFFICE PROGRESS NOTE  [address]  [address] [address] [id number]  Dept: [phone]   Chief Complaint   Patient presents with    Hypertension         HPI:   Hypertension: Patient here for follow-up of elevated blood pressure. He is exercising and is adherent to low salt diet. Blood pressure is well controlled at home. Cardiac symptoms none. Patient denies chest pain, chest pressure/discomfort, claudication, dyspnea, exertional chest pressure/discomfort, fatigue, irregular heart beat, lower extremity edema, near-syncope, orthopnea, palpitations, paroxysmal nocturnal dyspnea, syncope and tachypnea. Cardiovascular risk factors: advanced age (older than 54 for men, 72 for women), hypertension, male gender and smoking/ tobacco exposure. Use of agents associated with hypertension: none. History of target organ damage: left ventricular hypertrophy. Had echo with Dr India Hashimoto 12/30/19 had EF 54 - 60%, Mild aortic valve sclerosis without stenosis, mild mitral regurgitation, mild to moderate pulmonary hypertension, mild dilation of left atrium, mild concentric left ventricular hypertrophy. He has appointment in [place] for esophageal dilation as he started having problems with swallowing again. He has been taking the pantoprazole BID and he was noticing at night he would have trouble feeling SOB so he stopped the evening pill. Then started having problems with swallowing again. He had a friend who was on pantoprazole and was SOB they stopped it and she is doing fine now. He has lost 12 pounds since I saw him in January. He continues to smoke and drink his coffee.      His emphysema is well controlled and he sees DR Sergei Mejias   He has appointment with Dr Sergei Mejias regarding his BUZZ      Current Outpatient Medications:     pantoprazole (PROTONIX) 40 MG tablet, Take 1 tablet by mouth 2 times daily, Disp: 60 tablet, Rfl: 11    albuterol sulfate HFA (PROAIR HFA) 108 (90 Base) MCG/ACT inhaler, Inhale 2 puffs into the lungs every 6 hours as needed for Wheezing, Disp: 1 Inhaler, Rfl: 3    ANORO ELLIPTA 62.5-25 MCG/INH AEPB inhaler, Inhale 1 puff into the lungs daily One puff inhalation daily, Disp: , Rfl:     ramipril (ALTACE) 2.5 MG capsule, Take 2.5 mg by mouth daily, Disp: , Rfl:   Social History     Socioeconomic History    Marital status: Single     Spouse name: None    Number of children: None    Years of education: None    Highest education level: None   Occupational History    None   Social Needs    Financial resource strain: None    Food insecurity     Worry: None     Inability: None    Transportation needs     Medical: None     Non-medical: None   Tobacco Use    Smoking status: Current Some Day Smoker     Packs/day: 2.00     Years: 30.00     Pack years: 60.00     Types: Cigarettes     Start date: 1/10/1962     Last attempt to quit: 2018     Years since quittin.5    Smokeless tobacco: Never Used   Substance and Sexual Activity    Alcohol use: Yes     Comment: Social    Drug use: No    Sexual activity: None   Lifestyle    Physical activity     Days per week: None     Minutes per session: None    Stress: None   Relationships    Social connections     Talks on phone: None     Gets together: None     Attends Mandaen service: None     Active member of club or organization: None     Attends meetings of clubs or organizations: None     Relationship status: None    Intimate partner violence     Fear of current or ex partner: None     Emotionally abused: None     Physically abused: None     Forced sexual activity: None   Other Topics Concern    None   Social History Narrative    None       I have reviewed Tyrese's allergies, medications, problem list, medical, social and family history and have updated as needed in the electronic medical record    Review of Systems   Constitutional: Positive for unexpected weight change.  Negative for activity change, appetite change, chills, diaphoresis, fatigue and fever. HENT: Positive for trouble swallowing. Negative for congestion, dental problem, drooling, ear discharge, ear pain, facial swelling, hearing loss, mouth sores, nosebleeds, postnasal drip, rhinorrhea, sinus pressure, sinus pain, sneezing, sore throat, tinnitus and voice change. Eyes: Negative for visual disturbance. Respiratory: Negative for cough, chest tightness, shortness of breath and wheezing. Cardiovascular: Negative for chest pain, palpitations and leg swelling. Gastrointestinal: Negative for abdominal pain, constipation, diarrhea, nausea and vomiting. Endocrine: Negative for cold intolerance, heat intolerance, polydipsia, polyphagia and polyuria. Genitourinary: Negative for difficulty urinating, frequency and urgency. Musculoskeletal: Negative for arthralgias, back pain, gait problem, joint swelling, myalgias, neck pain and neck stiffness. Skin: Negative for color change, pallor, rash and wound. Allergic/Immunologic: Negative for environmental allergies, food allergies and immunocompromised state. Neurological: Negative for dizziness, tremors, seizures, syncope, facial asymmetry, speech difficulty, weakness, light-headedness, numbness and headaches. Hematological: Negative for adenopathy. Does not bruise/bleed easily. Psychiatric/Behavioral: Negative for agitation, behavioral problems, confusion, decreased concentration, dysphoric mood, hallucinations, self-injury, sleep disturbance and suicidal ideas. The patient is not nervous/anxious and is not hyperactive.         OBJECTIVE:     VS:  Wt Readings from Last 3 Encounters:   07/23/20 195 lb (88.5 kg)   01/21/20 207 lb 9.6 oz (94.2 kg)   10/21/19 201 lb (91.2 kg)                       Vitals:    07/23/20 0943   BP: 118/80   Pulse: 62   Resp: 18   Temp: 96.8 °F (36 °C)   SpO2: 99%   Weight: 195 lb (88.5 kg)   Height: 6' (1.829 m)       General: Alert and oriented to person, place, and time, well developed and well nourished, in no acute distress  SKIN: Warm and dry, intact without any rash, masses or lesions  HEAD: normocephalic, atraumatic  Eyes: sclera/conjunctiva clear, PERRLA, EOMI's intact  Neck: supple and non-tender without mass, trachea midline, no cervical lymphadenopathy, no bruit, no thyromegaly or nodules  Cardiovascular: regular rate and regular rhythm, normal S1 and S2,  no murmurs, rubs, clicks, or gallop. Distal pulses intact, no carotid bruits. No edema  Pulmonary/Chest: clear to auscultation bilaterally, no wheezes, rales or rhonchi, normal air movement, no respiratory distress  Abdomen: soft, non-tender, non-distended, normal bowel sounds, no masses or hepatosplenomegaly  Musculoskeletal: Normal ROM, no joint swelling, deformity or tenderness   Neurologic: reflexes normal and symmetric, no cranial nerve deficit, gait, coordination and speech normal  Extremities: no clubbing, cyanosis, or edema. Psychiatric: Good eye contact, normal mood and affect, answers questions appropriately    ASSESSMENT/PLAN   Amber Torres was seen today for hypertension. Diagnoses and all orders for this visit:    Essential hypertension stable  -     CBC Auto Differential; Future  -     Comprehensive Metabolic Panel; Future  -     Lipid Panel; Future  -Discussed taking medication and directed every day. -Discussed exercising daily 30 minutes 5 times a week for 150 minutes weekly.  -Discussed weight reduction if needed.  -Discussed low sodium diet.  -Discussed limiting caffeine consumption and tobacco cessation and the effects they have on the heart and blood pressure. Esophageal stenosis  -     pantoprazole (PROTONIX) 40 MG tablet;  Take 1 tablet by mouth 2 times daily  -Discussed elevated the HOB 30 degrees  -Discussed limiting spicy, fatty, greasy foods  -Discussed limit caffeine consumption to 1 - 2 cups daily  -Discussed waiting at least 3 - 4 hours before going to bed after eating  -Discussed not to eat and bend over immediately or lift heavy items.  -Discussed weight reduction if needed even 5 - 10 pounds.  -Discussed taking medications 1 hour prior to eating. 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 Discussed reducing caffeine intake, Discussed smoking cessation, Discussed exercising 30 minutes daily and Discussed taking medications as directed and adverse effects        I have reviewed my findings and recommendations with Paul Richmond.     Ramón Telles, NP-C, FNP-BC

## 2020-08-05 ENCOUNTER — HOSPITAL ENCOUNTER (OUTPATIENT)
Age: 70
Discharge: HOME | End: 2020-08-07
Payer: MEDICARE

## 2020-08-05 LAB
ALBUMIN SERPL-MCNC: 4.3 G/DL (ref 3.5–5.2)
ALP BLD-CCNC: 82 U/L (ref 40–129)
ALT SERPL-CCNC: 11 U/L (ref 0–40)
ANION GAP SERPL CALCULATED.3IONS-SCNC: 16 MMOL/L (ref 7–16)
AST SERPL-CCNC: 15 U/L (ref 0–39)
BASOPHILS ABSOLUTE: 0.09 E9/L (ref 0–0.2)
BASOPHILS RELATIVE PERCENT: 1.4 % (ref 0–2)
BILIRUB SERPL-MCNC: 0.4 MG/DL (ref 0–1.2)
BUN BLDV-MCNC: 14 MG/DL (ref 8–23)
CALCIUM SERPL-MCNC: 9.8 MG/DL (ref 8.6–10.2)
CEA: 3.7 NG/ML (ref 0–5.2)
CHLORIDE BLD-SCNC: 99 MMOL/L (ref 98–107)
CHOLESTEROL, TOTAL: 184 MG/DL (ref 0–199)
CO2: 23 MMOL/L (ref 22–29)
CREAT SERPL-MCNC: 1.2 MG/DL (ref 0.7–1.2)
EOSINOPHILS ABSOLUTE: 0.19 E9/L (ref 0.05–0.5)
EOSINOPHILS RELATIVE PERCENT: 2.9 % (ref 0–6)
GFR AFRICAN AMERICAN: >60
GFR NON-AFRICAN AMERICAN: 60 ML/MIN/1.73
GLUCOSE BLD-MCNC: 93 MG/DL (ref 74–99)
HCT VFR BLD CALC: 48.5 % (ref 37–54)
HDLC SERPL-MCNC: 39 MG/DL
HEMOGLOBIN: 15.2 G/DL (ref 12.5–16.5)
IMMATURE GRANULOCYTES #: 0.01 E9/L
IMMATURE GRANULOCYTES %: 0.2 % (ref 0–5)
LDL CHOLESTEROL CALCULATED: 123 MG/DL (ref 0–99)
LYMPHOCYTES ABSOLUTE: 2.14 E9/L (ref 1.5–4)
LYMPHOCYTES RELATIVE PERCENT: 32.6 % (ref 20–42)
MCH RBC QN AUTO: 29.5 PG (ref 26–35)
MCHC RBC AUTO-ENTMCNC: 31.3 % (ref 32–34.5)
MCV RBC AUTO: 94 FL (ref 80–99.9)
MONOCYTES ABSOLUTE: 0.8 E9/L (ref 0.1–0.95)
MONOCYTES RELATIVE PERCENT: 12.2 % (ref 2–12)
NEUTROPHILS ABSOLUTE: 3.34 E9/L (ref 1.8–7.3)
NEUTROPHILS RELATIVE PERCENT: 50.7 % (ref 43–80)
PDW BLD-RTO: 14.8 FL (ref 11.5–15)
PLATELET # BLD: 378 E9/L (ref 130–450)
PMV BLD AUTO: 9.7 FL (ref 7–12)
POTASSIUM SERPL-SCNC: 5.3 MMOL/L (ref 3.5–5)
RBC # BLD: 5.16 E12/L (ref 3.8–5.8)
SODIUM BLD-SCNC: 138 MMOL/L (ref 132–146)
TOTAL PROTEIN: 7.1 G/DL (ref 6.4–8.3)
TRIGL SERPL-MCNC: 112 MG/DL (ref 0–149)
VLDLC SERPL CALC-MCNC: 22 MG/DL
WBC # BLD: 6.6 E9/L (ref 4.5–11.5)

## 2020-08-05 PROCEDURE — 82378 CARCINOEMBRYONIC ANTIGEN: CPT

## 2020-08-05 PROCEDURE — 80053 COMPREHEN METABOLIC PANEL: CPT

## 2020-08-05 PROCEDURE — 85025 COMPLETE CBC W/AUTO DIFF WBC: CPT

## 2020-08-05 PROCEDURE — 80061 LIPID PANEL: CPT

## 2020-08-05 PROCEDURE — 36415 COLL VENOUS BLD VENIPUNCTURE: CPT

## 2020-08-06 ENCOUNTER — TELEPHONE (OUTPATIENT)
Dept: FAMILY MEDICINE CLINIC | Age: 70
End: 2020-08-06

## 2020-08-06 LAB
CONTROL: NORMAL
HEMOCCULT STL QL: NEGATIVE

## 2020-08-06 PROCEDURE — 82274 ASSAY TEST FOR BLOOD FECAL: CPT | Performed by: NURSE PRACTITIONER

## 2020-08-06 NOTE — TELEPHONE ENCOUNTER
Call Abhijeet Ozuna his labs are stable but over the last year CEA as risen slightly from 3.3  To 3.7.

## 2020-08-19 RX ORDER — METHYLPREDNISOLONE 4 MG/1
TABLET ORAL
Qty: 1 KIT | Refills: 0 | Status: SHIPPED | OUTPATIENT
Start: 2020-08-19 | End: 2020-08-25

## 2020-10-30 ENCOUNTER — OFFICE VISIT (OUTPATIENT)
Dept: FAMILY MEDICINE CLINIC | Age: 70
End: 2020-10-30
Payer: MEDICARE

## 2020-10-30 VITALS
TEMPERATURE: 98.8 F | HEIGHT: 72 IN | DIASTOLIC BLOOD PRESSURE: 72 MMHG | BODY MASS INDEX: 26.01 KG/M2 | WEIGHT: 192 LBS | RESPIRATION RATE: 18 BRPM | SYSTOLIC BLOOD PRESSURE: 120 MMHG | OXYGEN SATURATION: 98 % | HEART RATE: 58 BPM

## 2020-10-30 PROBLEM — Z90.81 ASPLENIA AFTER SURGICAL PROCEDURE: Status: ACTIVE | Noted: 2020-10-30

## 2020-10-30 PROCEDURE — G0439 PPPS, SUBSEQ VISIT: HCPCS | Performed by: NURSE PRACTITIONER

## 2020-10-30 PROCEDURE — 99406 BEHAV CHNG SMOKING 3-10 MIN: CPT | Performed by: NURSE PRACTITIONER

## 2020-10-30 PROCEDURE — G0008 ADMIN INFLUENZA VIRUS VAC: HCPCS | Performed by: NURSE PRACTITIONER

## 2020-10-30 PROCEDURE — 90694 VACC AIIV4 NO PRSRV 0.5ML IM: CPT | Performed by: NURSE PRACTITIONER

## 2020-10-30 RX ORDER — PANTOPRAZOLE SODIUM 40 MG/1
40 TABLET, DELAYED RELEASE ORAL 2 TIMES DAILY
Qty: 60 TABLET | Refills: 11 | Status: CANCELLED | OUTPATIENT
Start: 2020-10-30

## 2020-10-30 RX ORDER — ALBUTEROL SULFATE 90 UG/1
2 AEROSOL, METERED RESPIRATORY (INHALATION) EVERY 6 HOURS PRN
Qty: 1 INHALER | Refills: 0 | Status: SHIPPED | OUTPATIENT
Start: 2020-10-30

## 2020-10-30 RX ORDER — ALBUTEROL SULFATE 90 UG/1
2 AEROSOL, METERED RESPIRATORY (INHALATION) EVERY 6 HOURS PRN
Qty: 1 INHALER | Refills: 3 | Status: CANCELLED | OUTPATIENT
Start: 2020-10-30

## 2020-10-30 RX ORDER — UMECLIDINIUM BROMIDE AND VILANTEROL TRIFENATATE 62.5; 25 UG/1; UG/1
1 POWDER RESPIRATORY (INHALATION) DAILY
Status: CANCELLED | OUTPATIENT
Start: 2020-10-30

## 2020-10-30 RX ORDER — TETANUS AND DIPHTHERIA TOXOIDS ADSORBED 2; 2 [LF]/.5ML; [LF]/.5ML
0.5 INJECTION INTRAMUSCULAR ONCE
Qty: 0.5 ML | Refills: 0 | Status: SHIPPED | OUTPATIENT
Start: 2020-10-30 | End: 2020-10-30

## 2020-10-30 ASSESSMENT — PATIENT HEALTH QUESTIONNAIRE - PHQ9
SUM OF ALL RESPONSES TO PHQ9 QUESTIONS 1 & 2: 0
2. FEELING DOWN, DEPRESSED OR HOPELESS: 0
SUM OF ALL RESPONSES TO PHQ QUESTIONS 1-9: 0
1. LITTLE INTEREST OR PLEASURE IN DOING THINGS: 0

## 2020-10-30 ASSESSMENT — LIFESTYLE VARIABLES
AUDIT TOTAL SCORE: INCOMPLETE
HOW OFTEN DO YOU HAVE A DRINK CONTAINING ALCOHOL: 0
AUDIT-C TOTAL SCORE: INCOMPLETE
HOW OFTEN DO YOU HAVE A DRINK CONTAINING ALCOHOL: NEVER

## 2020-10-30 NOTE — PROGRESS NOTES
oriented to person, place and time, well developed and well- nourished, in no acute distress  Skin: warm and dry, no rash or erythema  Head: normocephalic and atraumatic  Eyes: pupils equal, round, and reactive to light, extraocular eye movements intact, conjunctivae normal  ENT: tympanic membrane, external ear and ear canal normal bilaterally, nose without deformity, nasal mucosa and turbinates normal without polyps  Neck: supple and non-tender without mass, no thyromegaly or thyroid nodules, no cervical lymphadenopathy  Pulmonary/Chest: clear to auscultation bilaterally- no wheezes, rales or rhonchi, normal air movement, no respiratory distress  Cardiovascular: normal rate, regular rhythm, normal S1 and S2, no murmurs, rubs, clicks, or gallops, distal pulses intact, no carotid bruits  Abdomen: soft, non-tender, non-distended, normal bowel sounds, no masses or organomegaly  Extremities: no cyanosis, clubbing or edema  Musculoskeletal: normal range of motion, no joint swelling, deformity or tenderness  Neurologic: reflexes normal and symmetric, no cranial nerve deficit, gait, coordination and speech normal    Patient's complete Health Risk Assessment and screening values have been reviewed and are found in Flowsheets. The following problems were reviewed today and where indicated follow up appointments were made and/or referrals ordered.     Positive Risk Factor Screenings with Interventions:     Substance History:  Social History     Tobacco History     Smoking Status  Current Some Day Smoker Smoking Start Date  1/10/1962 Last attempt to quit  1/9/2018 Smoking Frequency  2 packs/day for 30 years (61 pk yrs)    Smoking Tobacco Type  Cigarettes    Smokeless Tobacco Use  Never Used          Alcohol History     Alcohol Use Status  Yes Comment  Social          Drug Use     Drug Use Status  No          Sexual Activity     Sexually Active  Not Asked               Alcohol Screening:       A score of 8 or more is associated with harmful or hazardous drinking. A score of 13 or more in women, and 15 or more in men, is likely to indicate alcohol dependence. Substance Abuse Interventions:  · Tobacco abuse:  tobacco cessation tips and resources provided, patient is not ready to work toward tobacco cessation at this time, provider spent 3 minutes counseling patient    Health Habits/Nutrition:  Health Habits/Nutrition  Do you exercise for at least 20 minutes 2-3 times per week?: Yes  Have you lost any weight without trying in the past 3 months?: (!) Yes  Do you eat fewer than 2 meals per day?: No  Have you seen a dentist within the past year?: Yes  Body mass index: (!) 26.04  Health Habits/Nutrition Interventions:  · Nutritional issues:  states he eats well and all testing has been negative, he has been really active and feels this is why he has lost some weight, feels he will fatten up during the winter, he has one more trip to [facility] 12/4/2020 for EGD and possible repeat esophageal dilation. Hearing/Vision:  No exam data present  Hearing/Vision  Do you or your family notice any trouble with your hearing?: No  Do you have difficulty driving, watching TV, or doing any of your daily activities because of your eyesight?: No  Have you had an eye exam within the past year?: (!) No  Hearing/Vision Interventions:  · Vision concerns:  patient encouraged to make appointment with his/her eye specialist    Safety:  Safety  Do you have working smoke detectors?: Yes  Have all throw rugs been removed or fastened?: Yes  Do you have non-slip mats or surfaces in all bathtubs/showers?: Yes  Do all of your stairways have a railing or banister?: Yes  Are your doorways, halls and stairs free of clutter?: Yes  Do you always fasten your seatbelt when you are in a car?: (!) No  Safety Interventions:  · Home safety tips provided  · Discussed for is safety he should be wearing a seatbelt every time he gets in the car.      Personalized Preventive Plan   Current Health Maintenance Status  Immunization History   Administered Date(s) Administered    Influenza, High Dose (Fluzone 65 yrs and older) 09/15/2017, 09/21/2018    Influenza, Quadv, adjuvanted, 65 yrs +, IM, PF (Fluad) 10/30/2020    Influenza, Triv, inactivated, subunit, adjuvanted, IM (Fluad 65 yrs and older) 10/21/2019    Pneumococcal Conjugate 13-valent ([id number]) 05/10/2017    Pneumococcal Polysaccharide ([id number]) 07/06/2018        Health Maintenance   Topic Date Due    Meningococcal B vaccine (1 of 4 - Increased Risk Bexsero 2-dose series) 05/26/1960    DTaP/Tdap/Td vaccine (1 - Tdap) 05/26/1969    Annual Wellness Visit (AWV)  05/29/2019    Hib vaccine (1 of 1 - Risk 1-dose series) 07/23/2021 (Originally 8/26/1951)    Meningococcal (ACWY) vaccine (1 - Risk start before 7 months 4-dose series) 07/23/2021 (Originally 1950)    Shingles Vaccine (1 of 2) 07/23/2021 (Originally 5/26/2000)    Colon cancer screen colonoscopy  12/05/2020    Low dose CT lung screening  05/05/2021    Potassium monitoring  08/05/2021    Creatinine monitoring  08/05/2021    Lipid screen  08/05/2025    Flu vaccine  Completed    Pneumococcal 65+ yrs at Risk Vaccine  Completed    AAA screen  Completed    Hepatitis A vaccine  Aged Out    Hepatitis B vaccine  Aged Out    Hepatitis C screen  Discontinued     Recommendations for Preventive Services Due: see orders and patient instructions/AVS.  . Recommended screening schedule for the next 5-10 years is provided to the patient in written form: see Patient Instructions/AVS.    Christian Luque was seen today for medicare awv and health maintenance. Diagnoses and all orders for this visit:    Routine general medical examination at a health care facility    Chronic obstructive pulmonary disease, unspecified COPD type ([facility] [address]  -     albuterol sulfate HFA (PROAIR HFA) 108 (90 Base) MCG/ACT inhaler;  Inhale 2 puffs into the lungs every 6 hours as needed for Wheezing    Centrilobular emphysema (HCC)  -     albuterol sulfate HFA (PROAIR HFA) 108 (90 Base) MCG/ACT inhaler; Inhale 2 puffs into the lungs every 6 hours as needed for Wheezing    Needs flu shot  -     INFLUENZA, QUADV, ADJUVANTED, 65 YRS =, IM, PF, PREFILL SYR, 0.5ML (FLUAD)  · Wash your hands regularly, and keep your hands away from your face. · Cover your mouth when you cough or sneeze. · Rest, plenty of fluids, Tylenol for body aches, fever. · Stay home from school, work, and other public places until you are feeling better and your fever has been gone for at least 24 hours. The fever needs to have gone away on its own without the help of medicine. · Ask people living with you to talk to their doctors about preventing the flu. They may get antiviral medicine to keep from getting the flu from you. · To prevent the flu in the future, get a flu vaccine every fall. Encourage people living with you to get the vaccine. 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 These usually occur within 3 days of the shot and last a short time                Cardiovascular Disease Risk Counseling: Assessed the patient's risk to develop cardiovascular disease and reviewed main risk factors.    Reviewed steps to reduce disease risk including:   · Quitting tobacco use, reducing amount smoked, or not starting the habit  · Making healthy food choices  · Being physically active and gradualy increasing activity levels   · Reduce weight and determine a healthy BMI goal  · Monitor blood pressure and treat if higher than 140/90 mmHg  · Maintain blood total cholesterol levels under 5 mmol/l or 190 mg/dl  · Maintain LDL cholesterol levels under 3.0 mmol/l or 115 mg/dl   · Control blood glucose levels  · Consider taking aspirin (75 mg daily), once blood pressure is controlled   Provided a follow up plan. Time spent (minutes): 20  Tobacco Cessation Counseling: Patient advised about behavior change, including information about personal health harms, usage of appropriate cessation measures and benefits of cessation.   Time spent (minutes): 3

## 2020-10-30 NOTE — PATIENT INSTRUCTIONS
Stopping Smoking: Care Instructions  Your Care Instructions     Cigarette smokers crave the nicotine in cigarettes. Giving it up is much harder than simply changing a habit. Your body has to stop craving the nicotine. It is hard to quit, but you can do it. There are many tools that people use to quit smoking. You may find that combining tools works best for you. There are several steps to quitting. First you get ready to quit. Then you get support to help you. After that, you learn new skills and behaviors to become a nonsmoker. For many people, a necessary step is getting and using medicine. Your doctor will help you set up the plan that best meets your needs. You may want to attend a smoking cessation program to help you quit smoking. When you choose a program, look for one that has proven success. Ask your doctor for ideas. You will greatly increase your chances of success if you take medicine as well as get counseling or join a cessation program.  Some of the changes you feel when you first quit tobacco are uncomfortable. Your body will miss the nicotine at first, and you may feel short-tempered and grumpy. You may have trouble sleeping or concentrating. Medicine can help you deal with these symptoms. You may struggle with changing your smoking habits and rituals. The last step is the tricky one: Be prepared for the smoking urge to continue for a time. This is a lot to deal with, but keep at it. You will feel better. Follow-up care is a key part of your treatment and safety. Be sure to make and go to all appointments, and call your doctor if you are having problems. It's also a good idea to know your test results and keep a list of the medicines you take. How can you care for yourself at home? · Ask your family, friends, and coworkers for support. You have a better chance of quitting if you have help and support.   · Join a support group, such as Nicotine Anonymous, for people who are trying to quit smoking. · Consider signing up for a smoking cessation program, such as the American Lung Association's Freedom from Smoking program.  · Get text messaging support. Go to the website at www.smokefree. gov to sign up for the [facility] program.  · Set a quit date. Pick your date carefully so that it is not right in the middle of a big deadline or stressful time. Once you quit, do not even take a puff. Get rid of all ashtrays and lighters after your last cigarette. Clean your house and your clothes so that they do not smell of smoke. · Learn how to be a nonsmoker. Think about ways you can avoid those things that make you reach for a cigarette. ? Avoid situations that put you at greatest risk for smoking. For some people, it is hard to have a drink with friends without smoking. For others, they might skip a coffee break with coworkers who smoke. ? Change your daily routine. Take a different route to work or eat a meal in a different place. · Cut down on stress. Calm yourself or release tension by doing an activity you enjoy, such as reading a book, taking a hot bath, or gardening. · Talk to your doctor or pharmacist about nicotine replacement therapy, which replaces the nicotine in your body. You still get nicotine but you do not use tobacco. Nicotine replacement products help you slowly reduce the amount of nicotine you need. These products come in several forms, many of them available over-the-counter:  ? Nicotine patches  ? Nicotine gum and lozenges  ? Nicotine inhaler  · Ask your doctor about bupropion (Wellbutrin) or varenicline (Chantix), which are prescription medicines. They do not contain nicotine. They help you by reducing withdrawal symptoms, such as stress and anxiety. · Some people find hypnosis, acupuncture, and massage helpful for ending the smoking habit. · Eat a healthy diet and get regular exercise. Having healthy habits will help your body move past its craving for nicotine.   · Be prepared to keep trying. Most people are not successful the first few times they try to quit. Do not get mad at yourself if you smoke again. Make a list of things you learned and think about when you want to try again, such as next week, next month, or next year. Where can you learn more? Go to https://chpemarcellaewtien.healthParagon Print & [employer]. org and sign in to your [employer]oom[employer] account. Enter Q889 in the [employer] box to learn more about \"Stopping Smoking: Care Instructions. \"     If you do not have an account, please click on the \"Sign Up Now\" link. Current as of: March 12, 2020               Content Version: 12.6  © 2006-2020 [employer]. Care instructions adapted under license by [facility] ([facility]). If you have questions about a medical condition or this instruction, always ask your healthcare professional. [place] [address] any warranty or liability for your use of this information. Learning About Benefits From Quitting Smoking  How does quitting smoking make you healthier? If you're thinking about quitting smoking, you may have a few reasons to be smoke-free. Your health may be one of them. · When you quit smoking, you lower your risks for cancer, lung disease, heart attack, stroke, blood vessel disease, and blindness from macular degeneration. · When you're smoke-free, you get sick less often, and you heal faster. You are less likely to get colds, flu, bronchitis, and pneumonia. · As a nonsmoker, you may find that your mood is better and you are less stressed. When and how will you feel healthier? Quitting has real health benefits that start from day 1 of being smoke-free. And the longer you stay smoke-free, the healthier you get and the better you feel. The first hours  · After just 20 minutes, your blood pressure and heart rate go down. That means there's less stress on your heart and blood vessels.   · Within 12 hours, the level of carbon monoxide in your blood drops back to normal. That makes room for more oxygen. With more oxygen in your body, you may notice that you have more energy than when you smoked. After 2 weeks  · Your lungs start to work better. · Your risk of heart attack starts to drop. After 1 month  · When your lungs are clear, you cough less and breathe deeper, so it's easier to be active. · Your sense of taste and smell return. That means you can enjoy food more than you have since you started smoking. Over the years  · Over the years, your risks of heart disease, heart attack, and stroke are lower. · After 10 years, your risk of dying from lung cancer is cut by about half. And your risk for many other types of cancer is lower too. How would quitting help others in your life? When you quit smoking, you improve the health of everyone who now breathes in your smoke. · Their heart, lung, and cancer risks drop, much like yours. · They are sick less. For babies and small children, living smoke-free means they're less likely to have ear infections, pneumonia, and bronchitis. · If you're a woman who is or will be pregnant someday, quitting smoking means a healthier . · Children who are close to you are less likely to become adult smokers. Where can you learn more? Go to https://FitWithMe.Gobbler. org and sign in to your [employer] account. Enter [phone] in the [facility] box to learn more about \"Learning About Benefits From Quitting Smoking. \"     If you do not have an account, please click on the \"Sign Up Now\" link. Current as of: 2020               Content Version: 12.6  © 8993-4738 [employer]. Care instructions adapted under license by [facility] ([facility]). If you have questions about a medical condition or this instruction, always ask your healthcare professional. [place] [address] any warranty or liability for your use of this information.       Personalized Preventive Plan for [employer] Mahsa Rito - 10/30/2020  Medicare offers a range of preventive health benefits. Some of the tests and screenings are paid in full while other may be subject to a deductible, co-insurance, and/or copay. Some of these benefits include a comprehensive review of your medical history including lifestyle, illnesses that may run in your family, and various assessments and screenings as appropriate. After reviewing your medical record and screening and assessments performed today your provider may have ordered immunizations, labs, imaging, and/or referrals for you. A list of these orders (if applicable) as well as your Preventive Care list are included within your After Visit Summary for your review. Other Preventive Recommendations:    · A preventive eye exam performed by an eye specialist is recommended every 1-2 years to screen for glaucoma; cataracts, macular degeneration, and other eye disorders. · A preventive dental visit is recommended every 6 months. · Try to get at least 150 minutes of exercise per week or 10,000 steps per day on a pedometer . · Order or download the FREE \"Exercise & Physical Activity: Your Everyday Guide\" from [employer]. Call 1-649.646.1769 or search [employer] online. · You need 9337-5014 mg of calcium and 2459-9665 IU of vitamin D per day. It is possible to meet your calcium requirement with diet alone, but a vitamin D supplement is usually necessary to meet this goal.  · When exposed to the sun, use a sunscreen that protects against both UVA and UVB radiation with an SPF of 30 or greater. Reapply every 2 to 3 hours or after sweating, drying off with a towel, or swimming. · Always wear a seat belt when traveling in a car. Always wear a helmet when riding a bicycle or motorcycle. · Flue vaccine given today  · In 2 weeks get the first Meningococcal B then in a month get the second one. · Get Tetanus in 3 months.

## 2020-11-13 ENCOUNTER — TELEPHONE (OUTPATIENT)
Dept: FAMILY MEDICINE CLINIC | Age: 70
End: 2020-11-13

## 2020-11-13 NOTE — TELEPHONE ENCOUNTER
Fred Rajput, [address] [facility], called to inform he is unable to get Bexsero (Meningococcal B) in.  Fred Rajput stated he has Moses which is a 3 dose series - 1 dose, 2 mos later second dose, 4 mos later third dose. Please advise if this is acceptable. Informed that Amna Overton is out of the ofc.

## 2021-04-30 ENCOUNTER — OFFICE VISIT (OUTPATIENT)
Dept: FAMILY MEDICINE CLINIC | Age: 71
End: 2021-04-30
Payer: MEDICARE

## 2021-04-30 VITALS
TEMPERATURE: 97.6 F | DIASTOLIC BLOOD PRESSURE: 70 MMHG | HEART RATE: 77 BPM | SYSTOLIC BLOOD PRESSURE: 118 MMHG | HEIGHT: 72 IN | OXYGEN SATURATION: 94 % | BODY MASS INDEX: 26.07 KG/M2 | RESPIRATION RATE: 18 BRPM | WEIGHT: 192.5 LBS

## 2021-04-30 DIAGNOSIS — K22.2 ESOPHAGEAL STENOSIS: ICD-10-CM

## 2021-04-30 DIAGNOSIS — I10 ESSENTIAL HYPERTENSION: Primary | ICD-10-CM

## 2021-04-30 DIAGNOSIS — Z12.5 SCREENING PSA (PROSTATE SPECIFIC ANTIGEN): ICD-10-CM

## 2021-04-30 DIAGNOSIS — Z85.038 PERSONAL HISTORY OF COLON CANCER: ICD-10-CM

## 2021-04-30 PROCEDURE — 99213 OFFICE O/P EST LOW 20 MIN: CPT | Performed by: NURSE PRACTITIONER

## 2021-04-30 RX ORDER — OMEPRAZOLE 40 MG/1
CAPSULE, DELAYED RELEASE ORAL
COMMUNITY
Start: 2021-03-31 | End: 2021-11-01 | Stop reason: SDUPTHER

## 2021-04-30 ASSESSMENT — ENCOUNTER SYMPTOMS
VOICE CHANGE: 0
SINUS PRESSURE: 0
ABDOMINAL PAIN: 0
CONSTIPATION: 0
SHORTNESS OF BREATH: 0
RHINORRHEA: 0
CHEST TIGHTNESS: 0
DIARRHEA: 0
BACK PAIN: 0
COLOR CHANGE: 0
NAUSEA: 0
VOMITING: 0
FACIAL SWELLING: 0
SORE THROAT: 0
SINUS PAIN: 0
COUGH: 0
TROUBLE SWALLOWING: 0
WHEEZING: 0

## 2021-04-30 ASSESSMENT — PATIENT HEALTH QUESTIONNAIRE - PHQ9
SUM OF ALL RESPONSES TO PHQ QUESTIONS 1-9: 0
2. FEELING DOWN, DEPRESSED OR HOPELESS: 0
SUM OF ALL RESPONSES TO PHQ9 QUESTIONS 1 & 2: 0
1. LITTLE INTEREST OR PLEASURE IN DOING THINGS: 0

## 2021-04-30 NOTE — ASSESSMENT & PLAN NOTE
Well-controlled,  continue current medications pending lab review. Work on smoking cessation  -Discussed taking medication and directed every day. -Discussed exercising daily 30 minutes 5 times a week for 150 minutes weekly.  -Discussed weight reduction if needed.  -Discussed low sodium diet.  -Discussed limiting caffeine consumption and tobacco cessation and the effects they have on the heart and blood pressure. Echo Dr Grace Pack 12/30/19  SR, Left ventricular size is normal, mild concentric LVH, EF 55 - 43%  Diastolic filling pattern indicates impaired relaxation, left atrium mildly dilated, mild AV sclerosis without stenosis, mild mitral regurgitation, mild tricuspid regurgitation, mild to moderate pulmonary hypertension, no pericardial effusion.

## 2021-04-30 NOTE — PROGRESS NOTES
OFFICE PROGRESS NOTE  [address] [place] [address] Rd  [id number]  Dept: [phone]   Chief Complaint   Patient presents with    Hypertension       ASSESSMENT/PLAN   1. Essential hypertension  Assessment & Plan:   Well-controlled,  continue current medications pending lab review. Work on smoking cessation  -Discussed taking medication and directed every day. -Discussed exercising daily 30 minutes 5 times a week for 150 minutes weekly.  -Discussed weight reduction if needed.  -Discussed low sodium diet.  -Discussed limiting caffeine consumption and tobacco cessation and the effects they have on the heart and blood pressure. Echo Dr Rubin Harding 12/30/19  SR, Left ventricular size is normal, mild concentric LVH, EF 55 - 40%  Diastolic filling pattern indicates impaired relaxation, left atrium mildly dilated, mild AV sclerosis without stenosis, mild mitral regurgitation, mild tricuspid regurgitation, mild to moderate pulmonary hypertension, no pericardial effusion. Orders:  -     CBC Auto Differential; Future  -     Comprehensive Metabolic Panel; Future  -     Lipid Panel; Future  2. Esophageal stenosis  Assessment & Plan:   Well-controlled, continue current medications and see GI if any recurrence  3. Screening PSA (prostate specific antigen)  -     PSA screening; Future  4. Personal history of colon cancer  -     CEA; Future           Discussed smoking cessation, Discussed exercising 30 minutes daily and Discussed taking medications as directed and adverse effects    No follow-ups on file. HPI:     Here today for follow up on hypertension  Hypertension: Patient here for follow-up of elevated blood pressure. He is exercising and is adherent to low salt diet.  Blood pressure is well controlled at home. Cardiac symptoms none.  Patient denies chest pain, chest pressure/discomfort, claudication, dyspnea, exertional chest pressure/discomfort, fatigue, irregular heart beat, lower extremity edema, near-syncope, orthopnea, palpitations, paroxysmal nocturnal dyspnea, syncope and tachypnea.  Cardiovascular risk factors: advanced age (older than 54 for men, 72 for women), hypertension, male gender and smoking/ tobacco exposure. Use of agents associated with hypertension: none. History of target organ damage: left ventricular hypertrophy. Had echo with Dr Kj Renee 12/30/19 had EF 55 - 60%, Mild aortic valve sclerosis without stenosis, mild mitral regurgitation, mild to moderate pulmonary hypertension, mild dilation of left atrium, mild concentric left ventricular hypertrophy. He has history of colon cancer last colonoscopy last year at [facility]. Due for CEA and PSA with fasting labs . His esophageal stricture has been stable and his weight has been stable, no swallowing problems at this time.  He has appt with Dr Kj Renee end of the year for follow up and stress test.   Has appointment with Dr Auerlio Nieves upcoming and is scheduled for CT chest.       Current Outpatient Medications:     omeprazole (PRILOSEC) 40 MG delayed release capsule, , Disp: , Rfl:     albuterol sulfate HFA (PROAIR HFA) 108 (90 Base) MCG/ACT inhaler, Inhale 2 puffs into the lungs every 6 hours as needed for Wheezing, Disp: 1 Inhaler, Rfl: 0    ramipril (ALTACE) 2.5 MG capsule, Take 2.5 mg by mouth daily, Disp: , Rfl:     ANORO ELLIPTA 62.5-25 MCG/INH AEPB inhaler, Inhale 1 puff into the lungs daily One puff inhalation daily, Disp: , Rfl:     pantoprazole (PROTONIX) 40 MG tablet, Take 1 tablet by mouth 2 times daily, Disp: 60 tablet, Rfl: 11  Social History     Socioeconomic History    Marital status: Single     Spouse name: None    Number of children: None    Years of education: None    Highest education level: None   Occupational History    None   Social Needs    Financial resource strain: None    Food insecurity     Worry: None     Inability: None    Transportation needs     Medical: None Non-medical: None   Tobacco Use    Smoking status: Current Some Day Smoker     Packs/day: 2.00     Years: 30.00     Pack years: 60.00     Types: Cigarettes     Start date: 1/10/1962     Last attempt to quit: 1/9/2018     Years since quitting: 3.3    Smokeless tobacco: Never Used   Substance and Sexual Activity    Alcohol use: Yes     Comment: Social    Drug use: No    Sexual activity: None   Lifestyle    Physical activity     Days per week: None     Minutes per session: None    Stress: None   Relationships    Social connections     Talks on phone: None     Gets together: None     Attends Buddhism service: None     Active member of club or organization: None     Attends meetings of clubs or organizations: None     Relationship status: None    Intimate partner violence     Fear of current or ex partner: None     Emotionally abused: None     Physically abused: None     Forced sexual activity: None   Other Topics Concern    None   Social History Narrative    None       I have reviewed Tyrese's allergies, medications, problem list, medical, social and family history and have updated as needed in the electronic medical record    Review of Systems   Constitutional: Positive for activity change ( not as active). Negative for appetite change, chills, diaphoresis, fatigue, fever and unexpected weight change. HENT: Negative for congestion, dental problem, drooling, ear discharge, ear pain, facial swelling, hearing loss, mouth sores, nosebleeds, postnasal drip, rhinorrhea, sinus pressure, sinus pain, sneezing, sore throat, tinnitus, trouble swallowing and voice change. Eyes: Negative for visual disturbance. Respiratory: Negative for cough, chest tightness, shortness of breath and wheezing. Cardiovascular: Negative for chest pain, palpitations and leg swelling. Gastrointestinal: Negative for abdominal pain, constipation, diarrhea, nausea and vomiting.    Endocrine: Negative for cold intolerance, heat intolerance, polydipsia, polyphagia and polyuria. Genitourinary: Negative for difficulty urinating, frequency and urgency. Musculoskeletal: Positive for arthralgias ( left sciatic sees chiropractor and massotherapist). Negative for back pain, gait problem, joint swelling, myalgias, neck pain and neck stiffness. Skin: Negative for color change, pallor, rash and wound. Allergic/Immunologic: Negative for environmental allergies, food allergies and immunocompromised state. Neurological: Negative for dizziness, tremors, seizures, syncope, facial asymmetry, speech difficulty, weakness, light-headedness, numbness and headaches. Hematological: Negative for adenopathy. Does not bruise/bleed easily. Psychiatric/Behavioral: Negative for agitation, behavioral problems, confusion, decreased concentration, dysphoric mood, hallucinations, self-injury, sleep disturbance and suicidal ideas. The patient is not nervous/anxious and is not hyperactive.         OBJECTIVE:     VS:  Wt Readings from Last 3 Encounters:   04/30/21 192 lb 8 oz (87.3 kg)   10/30/20 192 lb (87.1 kg)   07/23/20 195 lb (88.5 kg)                       Vitals:    04/30/21 0703   BP: 118/70   Pulse: 77   Resp: 18   Temp: 97.6 °F (36.4 °C)   SpO2: 94%   Weight: 192 lb 8 oz (87.3 kg)   Height: 6' (1.829 m)       General: Alert and oriented to person, place, and time, well developed and well nourished, in no acute distress  SKIN: Warm and dry, intact without any rash, masses or lesions  HEAD: normocephalic, atraumatic  Eyes: sclera/conjunctiva clear, PERRLA, EOMI's intact  ENT: tympanic membranes, external ear and ear canal normal bilaterally, normal hearing, Nose without deformity, nasal mucosa and turbinates normal without polyps   Throat: clear, tongue midline, clear drainage, no masses or lesions noted, good dentition  Neck: supple and non-tender without mass, trachea midline, no cervical lymphadenopathy, no bruit, no thyromegaly or nodules  Cardiovascular: regular rate and regular rhythm, normal S1 and S2,  no murmurs, rubs, clicks, or gallop. Distal pulses intact, no carotid bruits. No edema  Pulmonary/Chest: clear to auscultation bilaterally, no wheezes, rales or rhonchi, normal air movement with longer exhalation, no respiratory distress  Abdomen: soft, non-tender, non-distended, normal bowel sounds, no masses or hepatosplenomegaly  Musculoskeletal: Normal ROM however with right shoulder significantly lower than the left, no joint swelling, deformity or tenderness   Neurologic: reflexes normal and symmetric, no cranial nerve deficit, gait, coordination and speech normal  Extremities: no clubbing, cyanosis, or edema. Psychiatric: Good eye contact, normal mood and affect, answers questions appropriately    I have reviewed my findings and recommendations with Betty Nelson.     Jenniffer Arceo, NP-C, FNP-BC

## 2021-06-22 DIAGNOSIS — I10 ESSENTIAL HYPERTENSION: ICD-10-CM

## 2021-06-22 DIAGNOSIS — Z85.038 PERSONAL HISTORY OF COLON CANCER: ICD-10-CM

## 2021-06-22 DIAGNOSIS — Z12.5 SCREENING PSA (PROSTATE SPECIFIC ANTIGEN): ICD-10-CM

## 2021-06-22 LAB
ALBUMIN SERPL-MCNC: 4.3 G/DL (ref 3.5–5.2)
ALP BLD-CCNC: 72 U/L (ref 40–129)
ALT SERPL-CCNC: 12 U/L (ref 0–40)
ANION GAP SERPL CALCULATED.3IONS-SCNC: 14 MMOL/L (ref 7–16)
AST SERPL-CCNC: 16 U/L (ref 0–39)
BASOPHILS ABSOLUTE: 0.08 E9/L (ref 0–0.2)
BASOPHILS RELATIVE PERCENT: 1.3 % (ref 0–2)
BILIRUB SERPL-MCNC: 0.5 MG/DL (ref 0–1.2)
BUN BLDV-MCNC: 14 MG/DL (ref 6–23)
CALCIUM SERPL-MCNC: 9.3 MG/DL (ref 8.6–10.2)
CHLORIDE BLD-SCNC: 102 MMOL/L (ref 98–107)
CHOLESTEROL, TOTAL: 180 MG/DL (ref 0–199)
CO2: 23 MMOL/L (ref 22–29)
CREAT SERPL-MCNC: 1.1 MG/DL (ref 0.7–1.2)
EOSINOPHILS ABSOLUTE: 0.29 E9/L (ref 0.05–0.5)
EOSINOPHILS RELATIVE PERCENT: 4.6 % (ref 0–6)
GFR AFRICAN AMERICAN: >60
GFR NON-AFRICAN AMERICAN: >60 ML/MIN/1.73
GLUCOSE BLD-MCNC: 89 MG/DL (ref 74–99)
HCT VFR BLD CALC: 44.3 % (ref 37–54)
HDLC SERPL-MCNC: 38 MG/DL
HEMOGLOBIN: 14.1 G/DL (ref 12.5–16.5)
IMMATURE GRANULOCYTES #: 0.01 E9/L
IMMATURE GRANULOCYTES %: 0.2 % (ref 0–5)
LDL CHOLESTEROL CALCULATED: 123 MG/DL (ref 0–99)
LYMPHOCYTES ABSOLUTE: 2.46 E9/L (ref 1.5–4)
LYMPHOCYTES RELATIVE PERCENT: 38.9 % (ref 20–42)
MCH RBC QN AUTO: 29.8 PG (ref 26–35)
MCHC RBC AUTO-ENTMCNC: 31.8 % (ref 32–34.5)
MCV RBC AUTO: 93.7 FL (ref 80–99.9)
MONOCYTES ABSOLUTE: 0.82 E9/L (ref 0.1–0.95)
MONOCYTES RELATIVE PERCENT: 13 % (ref 2–12)
NEUTROPHILS ABSOLUTE: 2.66 E9/L (ref 1.8–7.3)
NEUTROPHILS RELATIVE PERCENT: 42 % (ref 43–80)
PDW BLD-RTO: 14.2 FL (ref 11.5–15)
PLATELET # BLD: 322 E9/L (ref 130–450)
PMV BLD AUTO: 9.3 FL (ref 7–12)
POTASSIUM SERPL-SCNC: 4.8 MMOL/L (ref 3.5–5)
RBC # BLD: 4.73 E12/L (ref 3.8–5.8)
SODIUM BLD-SCNC: 139 MMOL/L (ref 132–146)
TOTAL PROTEIN: 6.9 G/DL (ref 6.4–8.3)
TRIGL SERPL-MCNC: 94 MG/DL (ref 0–149)
VLDLC SERPL CALC-MCNC: 19 MG/DL
WBC # BLD: 6.3 E9/L (ref 4.5–11.5)

## 2021-06-23 ENCOUNTER — TELEPHONE (OUTPATIENT)
Dept: FAMILY MEDICINE CLINIC | Age: 71
End: 2021-06-23

## 2021-06-23 LAB
CEA: 3.6 NG/ML (ref 0–5.2)
PROSTATE SPECIFIC ANTIGEN: 1.54 NG/ML (ref 0–4)

## 2021-06-24 NOTE — TELEPHONE ENCOUNTER
Patients number in chart was incorrect called patient contact obtain correct number, fixed in chart and contacted patient. He stated he does not see Dr. Aniyah Brunson until December or January but to plz go ahead and send labs.   I sent labs over and waiting on fax confirmation

## 2021-11-01 ENCOUNTER — OFFICE VISIT (OUTPATIENT)
Dept: FAMILY MEDICINE CLINIC | Age: 71
End: 2021-11-01
Payer: MEDICARE

## 2021-11-01 VITALS
OXYGEN SATURATION: 91 % | RESPIRATION RATE: 18 BRPM | TEMPERATURE: 97.1 F | DIASTOLIC BLOOD PRESSURE: 74 MMHG | HEART RATE: 74 BPM | SYSTOLIC BLOOD PRESSURE: 108 MMHG | HEIGHT: 72 IN | BODY MASS INDEX: 25.55 KG/M2 | WEIGHT: 188.6 LBS

## 2021-11-01 DIAGNOSIS — I10 ESSENTIAL HYPERTENSION: ICD-10-CM

## 2021-11-01 DIAGNOSIS — Z00.00 ROUTINE GENERAL MEDICAL EXAMINATION AT A HEALTH CARE FACILITY: Primary | ICD-10-CM

## 2021-11-01 DIAGNOSIS — Z23 NEED FOR PROPHYLACTIC VACCINATION AND INOCULATION AGAINST VARICELLA: ICD-10-CM

## 2021-11-01 DIAGNOSIS — Z23 NEEDS FLU SHOT: ICD-10-CM

## 2021-11-01 DIAGNOSIS — K22.70 BARRETT'S ESOPHAGUS DETERMINED BY ENDOSCOPY: ICD-10-CM

## 2021-11-01 DIAGNOSIS — J43.2 CENTRILOBULAR EMPHYSEMA (HCC): ICD-10-CM

## 2021-11-01 PROCEDURE — G0008 ADMIN INFLUENZA VIRUS VAC: HCPCS | Performed by: NURSE PRACTITIONER

## 2021-11-01 PROCEDURE — G0439 PPPS, SUBSEQ VISIT: HCPCS | Performed by: NURSE PRACTITIONER

## 2021-11-01 PROCEDURE — 90694 VACC AIIV4 NO PRSRV 0.5ML IM: CPT | Performed by: NURSE PRACTITIONER

## 2021-11-01 RX ORDER — OMEPRAZOLE 40 MG/1
40 CAPSULE, DELAYED RELEASE ORAL DAILY
Qty: 90 CAPSULE | Refills: 3 | Status: SHIPPED | OUTPATIENT
Start: 2021-11-01

## 2021-11-01 RX ORDER — UMECLIDINIUM BROMIDE AND VILANTEROL TRIFENATATE 62.5; 25 UG/1; UG/1
1 POWDER RESPIRATORY (INHALATION) DAILY
Qty: 3 EACH | Refills: 3 | Status: SHIPPED | OUTPATIENT
Start: 2021-11-01

## 2021-11-01 RX ORDER — RAMIPRIL 2.5 MG/1
2.5 CAPSULE ORAL DAILY
Qty: 90 CAPSULE | Refills: 3 | Status: SHIPPED | OUTPATIENT
Start: 2021-11-01

## 2021-11-01 SDOH — ECONOMIC STABILITY: FOOD INSECURITY: WITHIN THE PAST 12 MONTHS, YOU WORRIED THAT YOUR FOOD WOULD RUN OUT BEFORE YOU GOT MONEY TO BUY MORE.: NEVER TRUE

## 2021-11-01 SDOH — ECONOMIC STABILITY: HOUSING INSECURITY
IN THE LAST 12 MONTHS, WAS THERE A TIME WHEN YOU DID NOT HAVE A STEADY PLACE TO SLEEP OR SLEPT IN A SHELTER (INCLUDING NOW)?: NO

## 2021-11-01 SDOH — ECONOMIC STABILITY: INCOME INSECURITY: IN THE LAST 12 MONTHS, WAS THERE A TIME WHEN YOU WERE NOT ABLE TO PAY THE MORTGAGE OR RENT ON TIME?: NO

## 2021-11-01 SDOH — ECONOMIC STABILITY: TRANSPORTATION INSECURITY
IN THE PAST 12 MONTHS, HAS LACK OF TRANSPORTATION KEPT YOU FROM MEETINGS, WORK, OR FROM GETTING THINGS NEEDED FOR DAILY LIVING?: NO

## 2021-11-01 SDOH — ECONOMIC STABILITY: FOOD INSECURITY: WITHIN THE PAST 12 MONTHS, THE FOOD YOU BOUGHT JUST DIDN'T LAST AND YOU DIDN'T HAVE MONEY TO GET MORE.: NEVER TRUE

## 2021-11-01 SDOH — ECONOMIC STABILITY: TRANSPORTATION INSECURITY
IN THE PAST 12 MONTHS, HAS THE LACK OF TRANSPORTATION KEPT YOU FROM MEDICAL APPOINTMENTS OR FROM GETTING MEDICATIONS?: NO

## 2021-11-01 ASSESSMENT — PATIENT HEALTH QUESTIONNAIRE - PHQ9
SUM OF ALL RESPONSES TO PHQ QUESTIONS 1-9: 0
SUM OF ALL RESPONSES TO PHQ9 QUESTIONS 1 & 2: 0
1. LITTLE INTEREST OR PLEASURE IN DOING THINGS: 0
SUM OF ALL RESPONSES TO PHQ QUESTIONS 1-9: 0
2. FEELING DOWN, DEPRESSED OR HOPELESS: 0
SUM OF ALL RESPONSES TO PHQ QUESTIONS 1-9: 0

## 2021-11-01 ASSESSMENT — LIFESTYLE VARIABLES
AUDIT-C TOTAL SCORE: INCOMPLETE
HOW OFTEN DO YOU HAVE A DRINK CONTAINING ALCOHOL: 0
HOW OFTEN DO YOU HAVE A DRINK CONTAINING ALCOHOL: NEVER
HOW OFTEN DO YOU HAVE A DRINK CONTAINING ALCOHOL: MONTHLY OR LESS
HOW MANY STANDARD DRINKS CONTAINING ALCOHOL DO YOU HAVE ON A TYPICAL DAY: 1 OR 2
AUDIT TOTAL SCORE: INCOMPLETE

## 2021-11-01 ASSESSMENT — SOCIAL DETERMINANTS OF HEALTH (SDOH): HOW HARD IS IT FOR YOU TO PAY FOR THE VERY BASICS LIKE FOOD, HOUSING, MEDICAL CARE, AND HEATING?: NOT HARD AT ALL

## 2021-11-01 NOTE — PATIENT INSTRUCTIONS
Stopping Smoking: Care Instructions  Your Care Instructions     Cigarette smokers crave the nicotine in cigarettes. Giving it up is much harder than simply changing a habit. Your body has to stop craving the nicotine. It is hard to quit, but you can do it. There are many tools that people use to quit smoking. You may find that combining tools works best for you. There are several steps to quitting. First you get ready to quit. Then you get support to help you. After that, you learn new skills and behaviors to become a nonsmoker. For many people, a necessary step is getting and using medicine. Your doctor will help you set up the plan that best meets your needs. You may want to attend a smoking cessation program to help you quit smoking. When you choose a program, look for one that has proven success. Ask your doctor for ideas. You will greatly increase your chances of success if you take medicine as well as get counseling or join a cessation program.  Some of the changes you feel when you first quit tobacco are uncomfortable. Your body will miss the nicotine at first, and you may feel short-tempered and grumpy. You may have trouble sleeping or concentrating. Medicine can help you deal with these symptoms. You may struggle with changing your smoking habits and rituals. The last step is the tricky one: Be prepared for the smoking urge to continue for a time. This is a lot to deal with, but keep at it. You will feel better. Follow-up care is a key part of your treatment and safety. Be sure to make and go to all appointments, and call your doctor if you are having problems. It's also a good idea to know your test results and keep a list of the medicines you take. How can you care for yourself at home? · Ask your family, friends, and coworkers for support. You have a better chance of quitting if you have help and support.   · Join a support group, such as Nicotine Anonymous, for people who are trying to quit to keep trying. Most people are not successful the first few times they try to quit. Do not get mad at yourself if you smoke again. Make a list of things you learned and think about when you want to try again, such as next week, next month, or next year. Where can you learn more? Go to https://chpemarcellaewtien.[employer]. org and sign in to your i[employer] account. Enter S256 in the [employer] box to learn more about \"Stopping Smoking: Care Instructions. \"     If you do not have an account, please click on the \"Sign Up Now\" link. Current as of: February 11, 2021               Content Version: 13.0  © 2006-2021 Healthwise, [employer]. Care instructions adapted under license by [facility] ([facility]). If you have questions about a medical condition or this instruction, always ask your healthcare professional. [place] [address] any warranty or liability for your use of this information. Learning About Benefits From Quitting Smoking  How does quitting smoking make you healthier? If you're thinking about quitting smoking, you may have a few reasons to be smoke-free. Your health may be one of them. · When you quit smoking, you lower your risks for cancer, lung disease, heart attack, stroke, blood vessel disease, and blindness from macular degeneration. · When you're smoke-free, you get sick less often, and you heal faster. You are less likely to get colds, flu, bronchitis, and pneumonia. · As a nonsmoker, you may find that your mood is better and you are less stressed. When and how will you feel healthier? Quitting has real health benefits that start from day 1 of being smoke-free. And the longer you stay smoke-free, the healthier you get and the better you feel. The first hours  · After just 20 minutes, your blood pressure and heart rate go down. That means there's less stress on your heart and blood vessels.   · Within 12 hours, the level of carbon monoxide in your blood drops back to normal. That makes room for more oxygen. With more oxygen in your body, you may notice that you have more energy than when you smoked. After 2 weeks  · Your lungs start to work better. · Your risk of heart attack starts to drop. After 1 month  · When your lungs are clear, you cough less and breathe deeper, so it's easier to be active. · Your sense of taste and smell return. That means you can enjoy food more than you have since you started smoking. Over the years  · Over the years, your risks of heart disease, heart attack, and stroke are lower. · After 10 years, your risk of dying from lung cancer is cut by about half. And your risk for many other types of cancer is lower too. How would quitting help others in your life? When you quit smoking, you improve the health of everyone who now breathes in your smoke. · Their heart, lung, and cancer risks drop, much like yours. · They are sick less. For babies and small children, living smoke-free means they're less likely to have ear infections, pneumonia, and bronchitis. · If you're a woman who is or will be pregnant someday, quitting smoking means a healthier . · Children who are close to you are less likely to become adult smokers. Where can you learn more? Go to https://[employer]. org and sign in to your [employer] account. Enter [phone] in the [facility] box to learn more about \"Learning About Benefits From Quitting Smoking. \"     If you do not have an account, please click on the \"Sign Up Now\" link. Current as of: 2021               Content Version: 13.0  © 2714-3054 Healthwise, Inc[employer]. Care instructions adapted under license by [facility] ([facility]). If you have questions about a medical condition or this instruction, always ask your healthcare professional. [address] any warranty or liability for your use of this information.       Personalized Preventive Plan for [employer] Gokul Montiel - 11/1/2021  Medicare offers a range of preventive health benefits. Some of the tests and screenings are paid in full while other may be subject to a deductible, co-insurance, and/or copay. Some of these benefits include a comprehensive review of your medical history including lifestyle, illnesses that may run in your family, and various assessments and screenings as appropriate. After reviewing your medical record and screening and assessments performed today your provider may have ordered immunizations, labs, imaging, and/or referrals for you. A list of these orders (if applicable) as well as your Preventive Care list are included within your After Visit Summary for your review. Other Preventive Recommendations:    · A preventive eye exam performed by an eye specialist is recommended every 1-2 years to screen for glaucoma; cataracts, macular degeneration, and other eye disorders. · A preventive dental visit is recommended every 6 months. · Try to get at least 150 minutes of exercise per week or 10,000 steps per day on a pedometer . · Order or download the FREE \"Exercise & Physical Activity: Your Everyday Guide\" from [employer]. Call 6-881.848.5672 or search [employer] online. · You need 9916-8344 mg of calcium and 7130-7233 IU of vitamin D per day. It is possible to meet your calcium requirement with diet alone, but a vitamin D supplement is usually necessary to meet this goal.  · When exposed to the sun, use a sunscreen that protects against both UVA and UVB radiation with an SPF of 30 or greater. Reapply every 2 to 3 hours or after sweating, drying off with a towel, or swimming. · Always wear a seat belt when traveling in a car. Always wear a helmet when riding a bicycle or motorcycle.

## 2021-11-01 NOTE — PROGRESS NOTES
Medicare Annual Wellness Visit  Name: Gail Basilio Date: 2021   MRN: <[id number]> Sex: Male   Age: 70 y.o. Ethnicity: Non- / Non    : 1950 Race: White (non-)      Magali Mcmullen is here for Medicare AWV and Health Maintenance (flu shot given, due for meningococcal, hib vaccine and shingles. )    Screenings for behavioral, psychosocial and functional/safety risks, and cognitive dysfunction are all negative except as indicated below. These results, as well as other patient data from the [address] form, are documented in Flowsheets linked to this Encounter. Allergies   Allergen Reactions    Penicillins Hives and Swelling         Prior to Visit Medications    Medication Sig Taking? Authorizing Provider   ANORO ELLIPTA 62.5-25 MCG/INH AEPB inhaler Inhale 1 puff into the lungs daily One puff inhalation daily Yes CAITLIN Stanley CNP   ramipril (ALTACE) 2.5 MG capsule Take 1 capsule by mouth daily Yes CAITLIN Stanley CNP   omeprazole (PRILOSEC) 40 MG delayed release capsule Take 1 capsule by mouth daily Yes CAITLIN Stanley CNP   zoster recombinant adjuvanted vaccine [facility]) 50 MCG/0.5ML SUSR injection Inject 0.5 mLs into the muscle once for 1 dose Repeat in 2 - 6 months Yes CAITLIN Montes De Oca CNP   albuterol sulfate HFA (PROAIR HFA) 108 (90 Base) MCG/ACT inhaler Inhale 2 puffs into the lungs every 6 hours as needed for Wheezing Yes CAITLIN Stanley CNP         Past Medical History:   Diagnosis Date    Colon adenocarcinoma ([address] 2018    COPD [facility]ch[facility] obstructive [facility] disease) [facility] [address]     Hx of splenectomy 2017    40 years ago ruptured    Multiple lung nodules on CT     Polyp of colon 12/15/2017    Robotic-assist Low anterior rectosigmoid resection 2018 DR. D'Amico Pathology invasive moderately differentiated  adenocarcinoma arising in association with tubulovillous adenoma 5 lymph nodes removed and well developed and well- nourished, in no acute distress  Skin: warm and dry, no rash or erythema  Head: normocephalic and atraumatic  Eyes: pupils equal, round, and reactive to light, extraocular eye movements intact, conjunctivae normal  ENT: tympanic membrane, external ear and ear canal normal bilaterally, nose without deformity, nasal mucosa and turbinates normal without polyps  Neck: supple and non-tender without mass, no thyromegaly or thyroid nodules, no cervical lymphadenopathy  Pulmonary/Chest: clear to auscultation bilaterally- no wheezes, rales or rhonchi, normal air movement, no respiratory distress  Cardiovascular: normal rate, regular rhythm, normal S1 and S2, no murmurs, rubs, clicks, or gallops, distal pulses intact, no carotid bruits  Abdomen: soft, non-tender, non-distended, normal bowel sounds, no masses or organomegaly  Extremities: no cyanosis, clubbing or edema  Musculoskeletal: normal range of motion but right shoulder is much lower than the left and pain in the sciatic on palpation, no joint swelling,   Neurologic: reflexes normal and symmetric, no cranial nerve deficit, antalgic gait, coordination and speech normal    Patient's complete Health Risk Assessment and screening values have been reviewed and are found in Flowsheets. The following problems were reviewed today and where indicated follow up appointments were made and/or referrals ordered.     Positive Risk Factor Screenings with Interventions:         Substance History:  Social History     Tobacco History     Smoking Status  Current Some Day Smoker Smoking Start Date  1/10/1962 Last attempt to quit  1/9/2018 Smoking Frequency  2 packs/day for 30 years (61 pk yrs)    Smoking Tobacco Type  Cigarettes    Smokeless Tobacco Use  Never Used          Alcohol History     Alcohol Use Status  Yes Comment  Social          Drug Use     Drug Use Status  No          Sexual Activity     Sexually Active  Not Asked               Alcohol Screening: A score of 8 or more is associated with harmful or hazardous drinking. A score of 13 or more in women, and 15 or more in men, is likely to indicate alcohol dependence. Substance Abuse Interventions:  · Tobacco abuse:  tobacco cessation tips and resources provided, provider spent 3 minutes counseling patient    General Health and ACP:  General  In general, how would you say your health is?: Good  In the past 7 days, have you experienced any of the following?  New or Increased Pain, New or Increased Fatigue, Loneliness, Social Isolation, Stress or Anger?: (!) New or Increased Pain  Do you get the social and emotional support that you need?: Yes  Do you have a Living Will?: Yes  Advance Directives     Power of  Living Will ACP-Advance Directive ACP-Power of Franki Mendez on 11/03/20 Filed on 11/03/20 [id number] [facility] Risk Interventions:  · Pain issues: home exercises provided, patient advised to follow-up in this office for further evaluation and treatment within 1 month(s)     Hearing/Vision:  No exam data present  Hearing/Vision  Do you or your family notice any trouble with your hearing that hasn't been managed with hearing aids?: No  Do you have difficulty driving, watching TV, or doing any of your daily activities because of your eyesight?: (!) Yes  Have you had an eye exam within the past year?: Yes  Hearing/Vision Interventions:  · Vision concerns:  patient encouraged to make appointment with his/her eye specialist    Safety:  Safety  Do you have working smoke detectors?: (!) No  Have all throw rugs been removed or fastened?: Yes  Do you have non-slip mats or surfaces in all bathtubs/showers?: Yes  Do all of your stairways have a railing or banister?: Yes  Are your doorways, halls and stairs free of clutter?: Yes  Do you always fasten your seatbelt when you are in a car?: (!) No  Safety Interventions:  · Home safety tips provided  · Patient declines any further evaluation/treatment for this issue     Personalized Preventive Plan   Current Health Maintenance Status  Immunization History   Administered Date(s) Administered    COVID-19, David Pereyra, PF, 30mcg/0.3mL 03/02/2021, 03/23/2021, 10/01/2021    Influenza, High Dose (Fluzone 65 yrs and older) 09/15/2017, 09/21/2018    Influenza, Quadv, adjuvanted, 65 yrs +, IM, PF (Fluad) 10/30/2020, 11/01/2021    Influenza, Triv, inactivated, subunit, adjuvanted, IM (Fluad 65 yrs and older) 10/21/2019    Meningococcal B, Recombinant [place]) 11/17/2020, 01/17/2021, 05/14/2021    Pneumococcal Conjugate 13-valent ([id number]) 05/10/2017    Pneumococcal Polysaccharide ([id number]) 07/06/2018    Tdap (Boostrix, Adacel) 12/15/2020        Health Maintenance   Topic Date Due    Meningococcal (ACWY) vaccine (1 - Risk start before 7 months 4-dose series) Never done    Hib vaccine (1 of 1 - Risk 1-dose series) Never done    Shingles Vaccine (1 of 2) Never done   [employer] Visit (AWV)  10/31/2021    Meningococcal B vaccine (4 of 5 - Increased Risk [place] 3-dose series) 05/14/2022    Potassium monitoring  06/22/2022    Creatinine monitoring  06/22/2022    Low dose CT lung screening  07/13/2022    Colon cancer screen colonoscopy  12/05/2022    Lipid screen  06/22/2026    DTaP/Tdap/Td vaccine (2 - Td or Tdap) 12/15/2030    Flu vaccine  Completed    Pneumococcal 65+ yrs at Risk Vaccine  Completed    COVID-19 Vaccine  Completed    AAA screen  Completed    Hepatitis A vaccine  Aged Out    Hepatitis B vaccine  Aged Out    Hepatitis C screen  Discontinued     Recommendations for Preventive Services Due: see orders and patient instructions/AVS.  . Recommended screening schedule for the next 5-10 years is provided to the patient in written form: see Patient Instructions/AVS.    Rich Simons was seen today for medicare awv and health maintenance.     Diagnoses and all orders for this visit:    Routine general medical examination at a [facility] facility    Needs flu shot  -     INFLUENZA, QUADV, ADJUVANTED, 72 YRS =, IM, PF, PREFILL SYR, 0.5ML (FLUAD)  · Wash your hands regularly, and keep your hands away from your face. · Cover your mouth when you cough or sneeze. · Rest, plenty of fluids, Tylenol for body aches, fever. · Stay home from school, work, and other public places until you are feeling better and your fever has been gone for at least 24 hours. The fever needs to have gone away on its own without the help of medicine. · Ask people living with you to talk to their doctors about preventing the flu. They may get antiviral medicine to keep from getting the flu from you. · To prevent the flu in the future, get a flu vaccine every fall. Encourage people living with you to get the vaccine. ·     Need for prophylactic vaccination and inoculation against varicella  -     zoster recombinant adjuvanted vaccine [facility]) 50 MCG/0.5ML SUSR injection; Inject 0.5 mLs into the muscle once for 1 dose Repeat in 2 - 6 months  Hold until January or February to start series    Not addressed today      [facility] esophagus determined by endoscopy  -     omeprazole (PRILOSEC) 40 MG delayed release capsule; Take 1 capsule by mouth daily    Centrilobular emphysema (HCC)  -     ANORO ELLIPTA 62.5-25 MCG/INH AEPB inhaler; Inhale 1 puff into the lungs daily One puff inhalation daily    Essential hypertension  -     ramipril (ALTACE) 2.5 MG capsule; Take 1 capsule by mouth daily                 Cardiovascular Disease Risk Counseling: Assessed the patient's risk to develop cardiovascular disease and reviewed main risk factors.    Reviewed steps to reduce disease risk including:   · Quitting tobacco use, reducing amount smoked, or not starting the habit  · Making healthy food choices  · Being physically active and gradualy increasing activity levels   · Reduce weight and determine a healthy BMI goal  · Monitor blood pressure and treat if higher than 140/90 mmHg  · Maintain blood total cholesterol levels under 5 mmol/l or 190 mg/dl  · Maintain LDL cholesterol levels under 3.0 mmol/l or 115 mg/dl   · Control blood glucose levels  · Consider taking aspirin (75 mg daily), once blood pressure is controlled   Provided a follow up plan. Time spent (minutes): 15  Tobacco Cessation Counseling: Patient advised about behavior change, including information about personal health harms, usage of appropriate cessation measures and benefits of cessation.   Time spent (minutes): 3

## 2021-12-09 DIAGNOSIS — Z85.038 PERSONAL HISTORY OF COLON CANCER: Primary | ICD-10-CM

## 2021-12-09 LAB
CONTROL: NORMAL
HEMOCCULT STL QL: NORMAL

## 2021-12-09 PROCEDURE — 82274 ASSAY TEST FOR BLOOD FECAL: CPT | Performed by: NURSE PRACTITIONER

## 2021-12-29 ENCOUNTER — OFFICE VISIT (OUTPATIENT)
Dept: FAMILY MEDICINE CLINIC | Age: 71
End: 2021-12-29
Payer: MEDICARE

## 2021-12-29 VITALS
OXYGEN SATURATION: 97 % | RESPIRATION RATE: 16 BRPM | HEIGHT: 72 IN | HEART RATE: 67 BPM | WEIGHT: 189.2 LBS | BODY MASS INDEX: 25.63 KG/M2 | SYSTOLIC BLOOD PRESSURE: 114 MMHG | TEMPERATURE: 97.9 F | DIASTOLIC BLOOD PRESSURE: 68 MMHG

## 2021-12-29 DIAGNOSIS — I10 ESSENTIAL HYPERTENSION: ICD-10-CM

## 2021-12-29 DIAGNOSIS — G89.29 CHRONIC BILATERAL LOW BACK PAIN WITH LEFT-SIDED SCIATICA: Primary | ICD-10-CM

## 2021-12-29 DIAGNOSIS — L21.9 SEBORRHEIC DERMATITIS OF SCALP: ICD-10-CM

## 2021-12-29 DIAGNOSIS — M54.42 CHRONIC BILATERAL LOW BACK PAIN WITH LEFT-SIDED SCIATICA: Primary | ICD-10-CM

## 2021-12-29 PROBLEM — L82.1 SEBORRHEIC KERATOSIS OF SCALP: Status: ACTIVE | Noted: 2021-12-29

## 2021-12-29 PROCEDURE — 99212 OFFICE O/P EST SF 10 MIN: CPT | Performed by: NURSE PRACTITIONER

## 2021-12-29 ASSESSMENT — ENCOUNTER SYMPTOMS
DIARRHEA: 0
CHEST TIGHTNESS: 0
VOMITING: 0
NAUSEA: 0
BACK PAIN: 1
VOICE CHANGE: 0
SORE THROAT: 0
COLOR CHANGE: 0
WHEEZING: 0
TROUBLE SWALLOWING: 0
SHORTNESS OF BREATH: 0
SINUS PRESSURE: 0
SINUS PAIN: 0
COUGH: 0
RHINORRHEA: 0
ABDOMINAL PAIN: 0
CONSTIPATION: 0
FACIAL SWELLING: 0

## 2021-12-29 NOTE — PROGRESS NOTES
OFFICE PROGRESS NOTE  [address]  [address] [facility] [address]  Dept: [phone]   Chief Complaint   Patient presents with    1 Month Follow-Up     Back and Leg    Other     Check \"Black Spot\" On Scalp.  Health Maintenance     Due for Shingles       ASSESSMENT/PLAN   1. Chronic bilateral low back pain with left-sided sciatica  Assessment & Plan:   Borderline controlled, lifestyle modifications recommended and will check lumbar films for chiropractor, stay active, discussed red flag symptoms  Orders:  -     XR LUMBAR SPINE (2-3 VIEWS); Future  2. Seborrheic dermatitis of scalp  Assessment & Plan:  New monitor for any changes of size, color or shape. Declines dermatology referral today  3. Essential hypertension  -     CBC Auto Differential; Future  -     Comprehensive Metabolic Panel; Future  -     Lipid Panel; Future       Reviewed labs: FIT test      Discussed smoking cessation, Discussed exercising 30 minutes daily and Discussed taking medications as directed and adverse effects    Return in about 3 months (around 3/29/2022). HPI:   Here today for chronic back pain he states he would like an x ray as he hasn't had one for about 10 years. He does follow with chiropractor and would like copy of film to take for his appointment. He has degenerative disc disease and scoliosis. He does have better mobility than he has and states his back is just wore out. Occasional numbness and tingling down the legs and if he can stretch out it will go away. He says the left sciatic usually knots up and causes the problem he has been seeing a massotherapy who helps him a lot. Denies any red flag symptoms at this time. He has a area on his scalp he would like looked at. Has been there a couple of years not sure if it has changed or not.      He also had + FIT test and with his hx of colon cancer he hasn't had colonoscopy for the last 1 1/2 years and does have an appointment at [facility] 1/24/21. Current Outpatient Medications:     ANORO ELLIPTA 62.5-25 MCG/INH AEPB inhaler, Inhale 1 puff into the lungs daily One puff inhalation daily, Disp: 3 each, Rfl: 3    ramipril (ALTACE) 2.5 MG capsule, Take 1 capsule by mouth daily, Disp: 90 capsule, Rfl: 3    omeprazole (PRILOSEC) 40 MG delayed release capsule, Take 1 capsule by mouth daily, Disp: 90 capsule, Rfl: 3    albuterol sulfate HFA (PROAIR HFA) 108 (90 Base) MCG/ACT inhaler, Inhale 2 puffs into the lungs every 6 hours as needed for Wheezing, Disp: 1 Inhaler, Rfl: 0      Surgical History:  has a past surgical history that includes Splenectomy; Hemorrhoid surgery; Tonsillectomy and adenoidectomy; and Cardiac catheterization. Social History:  reports that he has been smoking cigarettes. He started smoking about 60 years ago. He has a 60.00 pack-year smoking history. He has never used smokeless tobacco. He reports current alcohol use. He reports that he does not use drugs. Family History: family history includes Cancer in his father, mother, and sister. I have reviewed Tyrese's allergies, medications, problem list, medical, social and family history and have updated as needed in the electronic medical record    Review of Systems   Constitutional: Negative for activity change, appetite change, chills, diaphoresis, fatigue, fever and unexpected weight change. HENT: Positive for dental problem. Negative for congestion, drooling, ear discharge, ear pain, facial swelling, hearing loss, mouth sores, nosebleeds, postnasal drip, rhinorrhea, sinus pressure, sinus pain, sneezing, sore throat, tinnitus, trouble swallowing and voice change. Eyes: Negative for visual disturbance. Respiratory: Negative for cough, chest tightness, shortness of breath and wheezing. Cardiovascular: Negative for chest pain, palpitations and leg swelling. Gastrointestinal: Negative for abdominal pain, constipation, diarrhea, nausea and vomiting. Endocrine: Negative for cold intolerance, heat intolerance, polydipsia, polyphagia and polyuria. Genitourinary: Negative for difficulty urinating, frequency and urgency. Musculoskeletal: Positive for back pain. Negative for arthralgias, gait problem, joint swelling, myalgias, neck pain and neck stiffness. Skin: Negative for color change, pallor, rash and wound. Mole on scalp   Allergic/Immunologic: Negative for environmental allergies, food allergies and immunocompromised state. Neurological: Negative for dizziness, tremors, seizures, syncope, facial asymmetry, speech difficulty, weakness, light-headedness, numbness and headaches. Hematological: Negative for adenopathy. Does not bruise/bleed easily. Psychiatric/Behavioral: Negative for agitation, behavioral problems, confusion, decreased concentration, dysphoric mood, hallucinations, self-injury, sleep disturbance and suicidal ideas. The patient is not nervous/anxious and is not hyperactive. OBJECTIVE:     VS:  Wt Readings from Last 3 Encounters:   12/29/21 189 lb 3.2 oz (85.8 kg)   11/01/21 188 lb 9.6 oz (85.5 kg)   04/30/21 192 lb 8 oz (87.3 kg)                       Vitals:    12/29/21 0826   BP: 114/68   Pulse: 67   Resp: 16   Temp: 97.9 °F (36.6 °C)   TempSrc: Temporal   SpO2: 97%   Weight: 189 lb 3.2 oz (85.8 kg)   Height: 6' (1.829 m)       General: Alert and oriented to person, place, and time, well developed and well nourished, in no acute distress  SKIN: Warm and dry, intact with 0.5 mm x 2 mm oblong flat smooth light brown lesion on left side of scalp.    HEAD: normocephalic, atraumatic  Eyes: sclera/conjunctiva clear, PERRLA, EOMI's intact  ENT: tympanic membranes, external ear and ear canal normal bilaterally, decreased hearing wears bilateral hearing aids   Neck: supple and non-tender without mass, trachea midline, no cervical lymphadenopathy, no bruit, no thyromegaly or nodules  Cardiovascular: regular rate and regular rhythm, normal S1 and S2,  no murmurs, rubs, clicks, or gallop. Distal pulses intact, no carotid bruits. No edema  Pulmonary/Chest: clear to auscultation bilaterally, no wheezes, rales or rhonchi, normal air movement, no respiratory distress  Abdomen: soft, non-tender, non-distended, normal bowel sounds, no masses or hepatosplenomegaly  Musculoskeletal: Normal ROM, right shoulder is significantly lower than the left and he has \"S\" curve to his spine all the way down to the lumbar no joint swelling, deformity or tenderness. Negative straight leg raises   Neurologic: reflexes normal and symmetric, no cranial nerve deficit, gait, coordination and speech normal  Extremities: no clubbing, cyanosis, or edema. Psychiatric: Good eye contact, normal mood and affect, answers questions appropriately    I have reviewed my findings and recommendations with [facility].     Hugh Davila, APRN - CNP, NP-C, FNP-BC

## 2022-01-03 ENCOUNTER — TELEPHONE (OUTPATIENT)
Dept: FAMILY MEDICINE CLINIC | Age: 72
End: 2022-01-03

## 2022-01-03 DIAGNOSIS — I10 ESSENTIAL HYPERTENSION: ICD-10-CM

## 2022-01-03 LAB
ALBUMIN SERPL-MCNC: 4.2 G/DL (ref 3.5–5.2)
ALP BLD-CCNC: 76 U/L (ref 40–129)
ALT SERPL-CCNC: 13 U/L (ref 0–40)
ANION GAP SERPL CALCULATED.3IONS-SCNC: 12 MMOL/L (ref 7–16)
AST SERPL-CCNC: 16 U/L (ref 0–39)
BASOPHILS ABSOLUTE: 0.08 E9/L (ref 0–0.2)
BASOPHILS RELATIVE PERCENT: 1.2 % (ref 0–2)
BILIRUB SERPL-MCNC: 0.5 MG/DL (ref 0–1.2)
BUN BLDV-MCNC: 12 MG/DL (ref 6–23)
CALCIUM SERPL-MCNC: 9.3 MG/DL (ref 8.6–10.2)
CHLORIDE BLD-SCNC: 100 MMOL/L (ref 98–107)
CHOLESTEROL, TOTAL: 183 MG/DL (ref 0–199)
CO2: 25 MMOL/L (ref 22–29)
CREAT SERPL-MCNC: 1 MG/DL (ref 0.7–1.2)
EOSINOPHILS ABSOLUTE: 0.23 E9/L (ref 0.05–0.5)
EOSINOPHILS RELATIVE PERCENT: 3.6 % (ref 0–6)
GFR AFRICAN AMERICAN: >60
GFR NON-AFRICAN AMERICAN: >60 ML/MIN/1.73
GLUCOSE BLD-MCNC: 93 MG/DL (ref 74–99)
HCT VFR BLD CALC: 45.2 % (ref 37–54)
HDLC SERPL-MCNC: 37 MG/DL
HEMOGLOBIN: 14.7 G/DL (ref 12.5–16.5)
IMMATURE GRANULOCYTES #: 0.01 E9/L
IMMATURE GRANULOCYTES %: 0.2 % (ref 0–5)
LDL CHOLESTEROL CALCULATED: 118 MG/DL (ref 0–99)
LYMPHOCYTES ABSOLUTE: 2.27 E9/L (ref 1.5–4)
LYMPHOCYTES RELATIVE PERCENT: 35.4 % (ref 20–42)
MCH RBC QN AUTO: 29.6 PG (ref 26–35)
MCHC RBC AUTO-ENTMCNC: 32.5 % (ref 32–34.5)
MCV RBC AUTO: 91.1 FL (ref 80–99.9)
MONOCYTES ABSOLUTE: 0.79 E9/L (ref 0.1–0.95)
MONOCYTES RELATIVE PERCENT: 12.3 % (ref 2–12)
NEUTROPHILS ABSOLUTE: 3.03 E9/L (ref 1.8–7.3)
NEUTROPHILS RELATIVE PERCENT: 47.3 % (ref 43–80)
PDW BLD-RTO: 13.4 FL (ref 11.5–15)
PLATELET # BLD: 348 E9/L (ref 130–450)
PMV BLD AUTO: 9.7 FL (ref 7–12)
POTASSIUM SERPL-SCNC: 4.6 MMOL/L (ref 3.5–5)
RBC # BLD: 4.96 E12/L (ref 3.8–5.8)
SODIUM BLD-SCNC: 137 MMOL/L (ref 132–146)
TOTAL PROTEIN: 6.9 G/DL (ref 6.4–8.3)
TRIGL SERPL-MCNC: 138 MG/DL (ref 0–149)
VLDLC SERPL CALC-MCNC: 28 MG/DL
WBC # BLD: 6.4 E9/L (ref 4.5–11.5)

## 2022-01-04 ENCOUNTER — TELEPHONE (OUTPATIENT)
Dept: FAMILY MEDICINE CLINIC | Age: 72
End: 2022-01-04

## 2022-03-30 ENCOUNTER — TELEPHONE (OUTPATIENT)
Dept: FAMILY MEDICINE CLINIC | Age: 72
End: 2022-03-30

## 2022-03-30 ENCOUNTER — OFFICE VISIT (OUTPATIENT)
Dept: FAMILY MEDICINE CLINIC | Age: 72
End: 2022-03-30
Payer: MEDICARE

## 2022-03-30 VITALS
BODY MASS INDEX: 25.63 KG/M2 | DIASTOLIC BLOOD PRESSURE: 62 MMHG | TEMPERATURE: 97.3 F | RESPIRATION RATE: 16 BRPM | HEART RATE: 68 BPM | HEIGHT: 72 IN | WEIGHT: 189.2 LBS | OXYGEN SATURATION: 90 % | SYSTOLIC BLOOD PRESSURE: 110 MMHG

## 2022-03-30 DIAGNOSIS — R53.83 FATIGUE, UNSPECIFIED TYPE: ICD-10-CM

## 2022-03-30 DIAGNOSIS — E55.9 HYPOVITAMINOSIS D: ICD-10-CM

## 2022-03-30 DIAGNOSIS — I10 ESSENTIAL HYPERTENSION: ICD-10-CM

## 2022-03-30 DIAGNOSIS — G89.29 CHRONIC BILATERAL LOW BACK PAIN WITH LEFT-SIDED SCIATICA: Primary | ICD-10-CM

## 2022-03-30 DIAGNOSIS — M54.42 CHRONIC BILATERAL LOW BACK PAIN WITH LEFT-SIDED SCIATICA: Primary | ICD-10-CM

## 2022-03-30 PROBLEM — R91.1 SOLITARY PULMONARY NODULE: Status: ACTIVE | Noted: 2022-03-30

## 2022-03-30 PROBLEM — E78.2 ELEVATED TRIGLYCERIDES WITH HIGH CHOLESTEROL: Status: RESOLVED | Noted: 2017-01-10 | Resolved: 2022-03-30

## 2022-03-30 LAB
ALBUMIN SERPL-MCNC: 4.6 G/DL (ref 3.5–5.2)
ALP BLD-CCNC: 68 U/L (ref 40–129)
ALT SERPL-CCNC: 12 U/L (ref 0–40)
ANION GAP SERPL CALCULATED.3IONS-SCNC: 13 MMOL/L (ref 7–16)
AST SERPL-CCNC: 16 U/L (ref 0–39)
BASOPHILS ABSOLUTE: 0.09 E9/L (ref 0–0.2)
BASOPHILS RELATIVE PERCENT: 1.2 % (ref 0–2)
BILIRUB SERPL-MCNC: 0.3 MG/DL (ref 0–1.2)
BUN BLDV-MCNC: 15 MG/DL (ref 6–23)
CALCIUM SERPL-MCNC: 9.5 MG/DL (ref 8.6–10.2)
CHLORIDE BLD-SCNC: 103 MMOL/L (ref 98–107)
CO2: 25 MMOL/L (ref 22–29)
CREAT SERPL-MCNC: 1 MG/DL (ref 0.7–1.2)
EOSINOPHILS ABSOLUTE: 0.16 E9/L (ref 0.05–0.5)
EOSINOPHILS RELATIVE PERCENT: 2.2 % (ref 0–6)
GFR AFRICAN AMERICAN: >60
GFR NON-AFRICAN AMERICAN: >60 ML/MIN/1.73
GLUCOSE BLD-MCNC: 76 MG/DL (ref 74–99)
HCT VFR BLD CALC: 44.7 % (ref 37–54)
HEMOGLOBIN: 14.7 G/DL (ref 12.5–16.5)
IMMATURE GRANULOCYTES #: 0.02 E9/L
IMMATURE GRANULOCYTES %: 0.3 % (ref 0–5)
LYMPHOCYTES ABSOLUTE: 2.47 E9/L (ref 1.5–4)
LYMPHOCYTES RELATIVE PERCENT: 33.8 % (ref 20–42)
MCH RBC QN AUTO: 29.9 PG (ref 26–35)
MCHC RBC AUTO-ENTMCNC: 32.9 % (ref 32–34.5)
MCV RBC AUTO: 91 FL (ref 80–99.9)
MONOCYTES ABSOLUTE: 0.98 E9/L (ref 0.1–0.95)
MONOCYTES RELATIVE PERCENT: 13.4 % (ref 2–12)
NEUTROPHILS ABSOLUTE: 3.59 E9/L (ref 1.8–7.3)
NEUTROPHILS RELATIVE PERCENT: 49.1 % (ref 43–80)
PDW BLD-RTO: 14.2 FL (ref 11.5–15)
PLATELET # BLD: 322 E9/L (ref 130–450)
PMV BLD AUTO: 9.6 FL (ref 7–12)
POTASSIUM SERPL-SCNC: 4.6 MMOL/L (ref 3.5–5)
RBC # BLD: 4.91 E12/L (ref 3.8–5.8)
SODIUM BLD-SCNC: 141 MMOL/L (ref 132–146)
T4 FREE: 1.27 NG/DL (ref 0.93–1.7)
TOTAL PROTEIN: 7.2 G/DL (ref 6.4–8.3)
TSH SERPL DL<=0.05 MIU/L-ACNC: 1.81 UIU/ML (ref 0.27–4.2)
VITAMIN D 25-HYDROXY: 35 NG/ML (ref 30–100)
WBC # BLD: 7.3 E9/L (ref 4.5–11.5)

## 2022-03-30 PROCEDURE — 99214 OFFICE O/P EST MOD 30 MIN: CPT | Performed by: NURSE PRACTITIONER

## 2022-03-30 RX ORDER — PREDNISOLONE ACETATE 10 MG/ML
SUSPENSION/ DROPS OPHTHALMIC
COMMUNITY
Start: 2022-02-14 | End: 2022-03-30 | Stop reason: CLARIF

## 2022-03-30 RX ORDER — CHLORAL HYDRATE 500 MG
3000 CAPSULE ORAL 3 TIMES DAILY
COMMUNITY

## 2022-03-30 RX ORDER — PREDNISOLONE ACETATE 10 MG/ML
SUSPENSION/ DROPS OPHTHALMIC
COMMUNITY

## 2022-03-30 ASSESSMENT — ENCOUNTER SYMPTOMS
APNEA: 1
VOMITING: 0
CHEST TIGHTNESS: 0
FACIAL SWELLING: 0
RHINORRHEA: 0
TROUBLE SWALLOWING: 0
SINUS PRESSURE: 0
VOICE CHANGE: 0
NAUSEA: 0
COUGH: 0
ABDOMINAL PAIN: 0
COLOR CHANGE: 0
WHEEZING: 0
SORE THROAT: 0
SHORTNESS OF BREATH: 0
SINUS PAIN: 0
BACK PAIN: 1
CONSTIPATION: 0
DIARRHEA: 0

## 2022-03-30 NOTE — ASSESSMENT & PLAN NOTE
Unclear control will check CMP, CBCD, TSH, FT4, with his heart condition wouldn't consider testosterone replacement. Discussed to drink more water and will call with lab results.

## 2022-03-30 NOTE — ASSESSMENT & PLAN NOTE
Borderline controlled referral to Dr Romel Smart for possible injection as he has scoliosis as well. His office will call for appt.

## 2022-03-30 NOTE — PROGRESS NOTES
OFFICE PROGRESS NOTE  [address] [place] [address] Rd  [id number]  Dept: [phone]   Chief Complaint   Patient presents with    Back Pain    Hypertension    Health Maintenance     due for shingles, meningococcal       ASSESSMENT/PLAN   1. Chronic bilateral low back pain with left-sided sciatica  Assessment & Plan:   Borderline controlled referral to Dr Emma Mcginnis for possible injection as he has scoliosis as well. His office will call for appt. 2. Essential hypertension  Assessment & Plan:   well controlled, no significant medication side effects noted and continue Ramipril  -Discussed taking medication and directed every day. -Discussed exercising daily 30 minutes 5 times a week for 150 minutes weekly.  -Discussed weight reduction if needed.  -Discussed low sodium diet.  -Discussed limiting caffeine consumption and tobacco cessation and the effects they have on the heart and blood pressure. 3. Fatigue, unspecified type  Assessment & Plan:   Unclear control will check CMP, CBCD, TSH, FT4, with his heart condition wouldn't consider testosterone replacement. Discussed to drink more water and will call with lab results. Orders:  -     CBC with Auto Differential; Future  -     Comprehensive Metabolic Panel; Future  -     TSH; Future  -     T4, Free; Future  4. Hypovitaminosis D  -     Vitamin D 25 Hydroxy; Future       Reviewed labs: CMP, CBCD, Lipids 1/3/2022 unremarkable  Reviewed notes from Dr Vicente Alvarez 12/2021  Reviewed radiology lumbar Xray 12/2021    Discussed reducing caffeine intake, Discussed smoking cessation, Discussed exercising 30 minutes daily and Discussed taking medications as directed and adverse effects    Return in about 3 months (around 6/30/2022) for fatigue. HPI:   He had his EGD and colonoscopy done which was clear this time. Did have to have dilation done again. Will get results from [facility].    Findings:      The terminal ileum appeared normal.      There was evidence of a prior end-to-side colo-colonic anastomosis in       the recto-sigmoid colon. This was patent and was characterized by       healthy appearing mucosa. The anastomosis was traversed.      No other significant abnormalities were identified in a careful       examination of the remainder of the colon.      The retroflexed view of the distal rectum and anal verge was normal       and showed no anal or rectal abnormalities.      Hemorrhoids were found on perianal exam.  Impression:           - The examined portion of the ileum was normal.                       - Patent end-to-side colo-colonic anastomosis,                        characterized by healthy appearing mucosa.                       - The distal rectum and anal verge are normal on                        retroflexion view.                       - Hemorrhoids found on perianal exam.                       - No specimens collected. 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 They did do an injection into his left eye and will have repeated monthly x 1 year. Here today for chronic back pain last Xray was in December which per patient he had chiropractor look at it and he felt unchanged over the last 10 years. He has degenerative disc disease and scoliosis. He does have better mobility than he has and states his back is just wore out. Occasional numbness and tingling down the legs and if he can stretch out it will go away. He says the left sciatic usually knots up and causes the problem he has been seeing a massotherapy who helps him a lot. Denies any red flag symptoms at this time.      EXAMINATION:   THREE XRAY VIEWS OF THE LUMBAR SPINE       12/29/2021 9:19 am       COMPARISON:   April 8, 2019       HISTORY:   ORDERING SYSTEM PROVIDED HISTORY: Chronic bilateral low back pain factors: advanced age (older than 54 for men, 72 for women), hypertension, male gender and smoking/ tobacco exposure. Use of agents associated with hypertension: none. History of target organ damage: left ventricular hypertrophy. Had echo with Dr Margoth Hanley 12/30/19 had EF 55 - 60%, Mild aortic valve sclerosis without stenosis, mild mitral regurgitation, mild to moderate pulmonary hypertension, mild dilation of left atrium, mild concentric left ventricular hypertrophy. Fatigue: Patient complains of fatigue. Symptoms began several months ago off and on. Sentinal symptom the patient feels fatigue began with: cold intolerance, constipation and change in hair texture. and sleep apnea treated CPAP. Symptoms of his fatigue have been none. Patient describes the following psychologic symptoms: none. Patient denies fever, significant change in weight, symptoms of arthritis, exercise intolerance, unusual rashes and GI blood loss. Symptoms have stabilized. Severity has been mild, symptoms bothersome, but easily able to carry out all usual work/school/family activities. Previous visits for this problem: none.        Current Outpatient Medications:     prednisoLONE acetate (PRED FORTE) 1 % ophthalmic suspension, prednisolone acetate 1 % eye drops,suspension, Disp: , Rfl:     Omega-3 Fatty Acids (FISH OIL) 1000 MG CAPS, Take 3,000 mg by mouth 3 times daily, Disp: , Rfl:     Multiple Vitamins-Minerals (ICAPS AREDS FORMULA PO), Take by mouth, Disp: , Rfl:     Gluc-Chonn-MSM-Boswellia-Vit D (GLUCOSAMINE CHONDROITIN + D3 PO), Take 1 tablet by mouth daily, Disp: , Rfl:     ANORO ELLIPTA 62.5-25 MCG/INH AEPB inhaler, Inhale 1 puff into the lungs daily One puff inhalation daily, Disp: 3 each, Rfl: 3    ramipril (ALTACE) 2.5 MG capsule, Take 1 capsule by mouth daily, Disp: 90 capsule, Rfl: 3    omeprazole (PRILOSEC) 40 MG delayed release capsule, Take 1 capsule by mouth daily, Disp: 90 capsule, Rfl: 3    albuterol sulfate HFA (PROAIR HFA) 108 (90 Base) MCG/ACT inhaler, Inhale 2 puffs into the lungs every 6 hours as needed for Wheezing, Disp: 1 Inhaler, Rfl: 0      Surgical History:  has a past surgical history that includes Splenectomy; Hemorrhoid surgery; Tonsillectomy and adenoidectomy; Cardiac catheterization; and eye surgery (Left, 03/2022). Social History:  reports that he has been smoking cigarettes. He started smoking about 60 years ago. He has a 60.00 pack-year smoking history. He has never used smokeless tobacco. He reports current alcohol use. He reports that he does not use drugs. Family History: family history includes Cancer in his father, mother, and sister. I have reviewed Tyrese's allergies, medications, problem list, medical, social and family history and have updated as needed in the electronic medical record    Review of Systems   Constitutional: Positive for fatigue. Negative for activity change, appetite change, chills, diaphoresis, fever and unexpected weight change. HENT: Negative for congestion, dental problem, drooling, ear discharge, ear pain, facial swelling, hearing loss, mouth sores, nosebleeds, postnasal drip, rhinorrhea, sinus pressure, sinus pain, sneezing, sore throat, tinnitus, trouble swallowing and voice change. Eyes: Negative for visual disturbance. Respiratory: Positive for apnea ( on CPAP). Negative for cough, chest tightness, shortness of breath and wheezing. Cardiovascular: Negative for chest pain, palpitations and leg swelling. Gastrointestinal: Negative for abdominal pain, constipation, diarrhea, nausea and vomiting. Endocrine: Negative for cold intolerance, heat intolerance, polydipsia, polyphagia and polyuria. Genitourinary: Negative for difficulty urinating, frequency and urgency. Musculoskeletal: Positive for back pain. Negative for arthralgias, gait problem, joint swelling, myalgias, neck pain and neck stiffness.    Skin: Negative for color change, pallor, rash and wound. Allergic/Immunologic: Negative for environmental allergies, food allergies and immunocompromised state. Neurological: Negative for dizziness, tremors, seizures, syncope, facial asymmetry, speech difficulty, weakness, light-headedness, numbness and headaches. Hematological: Negative for adenopathy. Does not bruise/bleed easily. Psychiatric/Behavioral: Negative for agitation, behavioral problems, confusion, decreased concentration, dysphoric mood, hallucinations, self-injury, sleep disturbance and suicidal ideas. The patient is not nervous/anxious and is not hyperactive. OBJECTIVE:     VS:  Wt Readings from Last 3 Encounters:   03/30/22 189 lb 3.2 oz (85.8 kg)   12/29/21 189 lb 3.2 oz (85.8 kg)   11/01/21 188 lb 9.6 oz (85.5 kg)                       Vitals:    03/30/22 0820   BP: 110/62   Pulse: 68   Resp: 16   Temp: 97.3 °F (36.3 °C)   SpO2: 90%   Weight: 189 lb 3.2 oz (85.8 kg)   Height: 6' (1.829 m)       General: Alert and oriented to person, place, and time, well developed and well nourished, in no acute distress  SKIN: Warm and dry, intact without any rash, masses or lesions  HEAD: normocephalic, atraumatic  Eyes: sclera/conjunctiva clear, PERRLA, EOMI's intact  ENT: tympanic membranes, external ear and ear canal normal bilaterally, normal hearing, Nose without deformity, nasal mucosa and turbinates normal without polyps   Throat: clear, tongue midline,  drainage, no masses or lesions noted, good dentition  Neck: supple and non-tender without mass, trachea midline, no cervical lymphadenopathy, no bruit, no thyromegaly or nodules  Cardiovascular: regular rate and regular rhythm, normal S1 and S2,  ,no murmurs, rubs, clicks, or gallop. Distal pulses intact, no carotid bruits.  No edema  Pulmonary/Chest: clear to auscultation bilaterally, no wheezes, rales or rhonchi, normal air movement, no respiratory distress  Abdomen: soft, non-tender, non-distended, normal bowel sounds, no masses or hepatosplenomegaly  Musculoskeletal: Normal ROM, however with right shoulder significantly lower than the left, left convex scoliosis lumbar noted,  no joint swelling,  or tenderness   Neurologic: reflexes normal and symmetric, no cranial nerve deficit, gait, coordination and speech normal  Extremities: no clubbing, cyanosis, or edema. Psychiatric: Good eye contact, normal mood and affect, answers questions appropriately    I have reviewed my findings and recommendations with Ronni Booker.     Enrique Messina, APRN - CNP, NP-C, FNP-BC

## 2022-03-30 NOTE — ASSESSMENT & PLAN NOTE
well controlled, no significant medication side effects noted and continue Ramipril  -Discussed taking medication and directed every day. -Discussed exercising daily 30 minutes 5 times a week for 150 minutes weekly.  -Discussed weight reduction if needed.  -Discussed low sodium diet.  -Discussed limiting caffeine consumption and tobacco cessation and the effects they have on the heart and blood pressure.

## 2022-03-30 NOTE — PATIENT INSTRUCTIONS
The medication list included in this document is our record of what you are currently taking, including any changes that were made at today's visit.  If you find any differences when compared to your medications at home, or have any questions that were not answered at your visit, please contact the office. Patient Education        Fatigue: Care Instructions  Your Care Instructions     Fatigue is a feeling of tiredness, exhaustion, or lack of energy. You may feel fatigue because of too much or not enough activity. It can also come from stress, lack of sleep, boredom, and poor diet. Many medical problems, such as viral infections, can cause fatigue. Emotional problems, especially depression,are often the cause of fatigue. Fatigue is most often a symptom of another problem. Treatment for fatigue depends on the cause. For example, if you have fatigue because you have a certain health problem, treating this problem also treats your fatigue. Ifdepression or anxiety is the cause, treatment may help. Follow-up care is a key part of your treatment and safety. Be sure to make and go to all appointments, and call your doctor if you are having problems. It's also a good idea to know your test results and keep alist of the medicines you take. How can you care for yourself at home?  Get regular exercise. But don't overdo it. Go back and forth between rest and exercise.  Get plenty of rest.   Eat a healthy diet. Do not skip meals, especially breakfast.   Reduce your use of caffeine, tobacco, and alcohol. Caffeine is most often found in coffee, tea, cola drinks, and chocolate.  Limit medicines that can cause fatigue. This includes tranquilizers and cold and allergy medicines. When should you call for help?   Watch closely for changes in your health, and be sure to contact your doctor if:     You have new symptoms such as fever or a rash.      Your fatigue gets worse.      You have been feeling down, depressed, or hopeless. Or you may have lost interest in things that you usually enjoy.      You are not getting better as expected. Where can you learn more? Go to [email or web address] and sign in to your [employer] account. Enter H457 in the 2threads box to learn more about \"Fatigue: Care Instructions. \"     If you do not have an account, please click on the \"Sign Up Now\" link. Current as of: July 1, 2021               Content Version: 13.2  © 0295-7482 Healthwise, Incorporated. Care instructions adapted under license by [facility] ([facility]). If you have questions about a medical condition or this instruction, always ask your healthcare professional. [address] any warranty or liability for your use of this information.

## 2022-04-05 ENCOUNTER — PATIENT MESSAGE (OUTPATIENT)
Dept: FAMILY MEDICINE CLINIC | Age: 72
End: 2022-04-05

## 2022-04-05 DIAGNOSIS — K22.70 BARRETT'S ESOPHAGUS DETERMINED BY ENDOSCOPY: ICD-10-CM

## 2022-04-06 RX ORDER — OMEPRAZOLE 40 MG/1
40 CAPSULE, DELAYED RELEASE ORAL DAILY
Qty: 90 CAPSULE | Refills: 3 | OUTPATIENT
Start: 2022-04-06

## 2022-04-06 NOTE — TELEPHONE ENCOUNTER
From: Efren Nunez  To: Timothy Kovacs  Sent: 4/5/2022 3:27 PM EDT  Subject: sorry. read my rx bottle wrong    need a refill on my omeprazo 40mg asap. [facility] in [address] [place] [address] David

## 2022-06-30 ENCOUNTER — OFFICE VISIT (OUTPATIENT)
Dept: FAMILY MEDICINE CLINIC | Age: 72
End: 2022-06-30
Payer: MEDICARE

## 2022-06-30 VITALS
TEMPERATURE: 97.9 F | DIASTOLIC BLOOD PRESSURE: 70 MMHG | BODY MASS INDEX: 24.24 KG/M2 | OXYGEN SATURATION: 97 % | RESPIRATION RATE: 16 BRPM | SYSTOLIC BLOOD PRESSURE: 116 MMHG | HEART RATE: 65 BPM | WEIGHT: 179 LBS | HEIGHT: 72 IN

## 2022-06-30 DIAGNOSIS — M54.42 CHRONIC BILATERAL LOW BACK PAIN WITH LEFT-SIDED SCIATICA: ICD-10-CM

## 2022-06-30 DIAGNOSIS — I10 ESSENTIAL HYPERTENSION: ICD-10-CM

## 2022-06-30 DIAGNOSIS — Z85.038 PERSONAL HISTORY OF COLON CANCER: ICD-10-CM

## 2022-06-30 DIAGNOSIS — G89.29 CHRONIC BILATERAL LOW BACK PAIN WITH LEFT-SIDED SCIATICA: ICD-10-CM

## 2022-06-30 DIAGNOSIS — I77.89 ENLARGED AORTA (HCC): ICD-10-CM

## 2022-06-30 DIAGNOSIS — I27.20 PULMONARY HYPERTENSION (HCC): ICD-10-CM

## 2022-06-30 DIAGNOSIS — Z12.5 SCREENING PSA (PROSTATE SPECIFIC ANTIGEN): ICD-10-CM

## 2022-06-30 DIAGNOSIS — R53.83 FATIGUE, UNSPECIFIED TYPE: ICD-10-CM

## 2022-06-30 DIAGNOSIS — J43.2 CENTRILOBULAR EMPHYSEMA (HCC): Primary | ICD-10-CM

## 2022-06-30 PROCEDURE — 99214 OFFICE O/P EST MOD 30 MIN: CPT | Performed by: NURSE PRACTITIONER

## 2022-06-30 PROCEDURE — 1123F ACP DISCUSS/DSCN MKR DOCD: CPT | Performed by: NURSE PRACTITIONER

## 2022-06-30 RX ORDER — BACLOFEN 10 MG/1
10 TABLET ORAL NIGHTLY PRN
Qty: 90 TABLET | Refills: 0 | Status: SHIPPED | OUTPATIENT
Start: 2022-06-30

## 2022-06-30 ASSESSMENT — PATIENT HEALTH QUESTIONNAIRE - PHQ9
SUM OF ALL RESPONSES TO PHQ QUESTIONS 1-9: 0
SUM OF ALL RESPONSES TO PHQ9 QUESTIONS 1 & 2: 0
SUM OF ALL RESPONSES TO PHQ QUESTIONS 1-9: 0
2. FEELING DOWN, DEPRESSED OR HOPELESS: 0
1. LITTLE INTEREST OR PLEASURE IN DOING THINGS: 0

## 2022-06-30 ASSESSMENT — ENCOUNTER SYMPTOMS
COUGH: 0
FACIAL SWELLING: 0
ABDOMINAL PAIN: 0
WHEEZING: 0
SINUS PRESSURE: 0
SORE THROAT: 0
VOICE CHANGE: 0
DIARRHEA: 0
CHEST TIGHTNESS: 0
VOMITING: 0
BACK PAIN: 1
RHINORRHEA: 0
CONSTIPATION: 0
TROUBLE SWALLOWING: 0
COLOR CHANGE: 0
SINUS PAIN: 0
NAUSEA: 0
SHORTNESS OF BREATH: 0

## 2022-06-30 ASSESSMENT — LIFESTYLE VARIABLES
HOW MANY STANDARD DRINKS CONTAINING ALCOHOL DO YOU HAVE ON A TYPICAL DAY: 1 OR 2
HOW OFTEN DO YOU HAVE A DRINK CONTAINING ALCOHOL: MONTHLY OR LESS

## 2022-06-30 NOTE — ASSESSMENT & PLAN NOTE
well controlled, no significant medication side effects noted and continue Ramipril 2.5 mg capsule.   -Discussed taking medication and directed every day. -Discussed exercising daily 30 minutes 5 times a week for 150 minutes weekly.  -Discussed weight reduction if needed.  -Discussed low sodium diet.  -Discussed limiting caffeine consumption and tobacco cessation and the effects they have on the heart and blood pressure.

## 2022-06-30 NOTE — PROGRESS NOTES
OFFICE PROGRESS NOTE  [address] [place] [address] Rd  [address] [id number]  Dept: [phone]   Chief Complaint   Patient presents with    3 Month Follow-Up     fatigue    Health Maintenance     due for low dose ct lung screening, Meningococcal, shingles       ASSESSMENT/PLAN   1. Centrilobular emphysema ([facility] [address]  Assessment & Plan:   Monitored by specialist- no acute findings meriting change in the plan. Has been stable and will be seeing Dr Jakob Millan in August after his CT scan    2. Pulmonary hypertension ([facility] [address]  Assessment & Plan:   Monitored by specialist- no acute findings meriting change in the plan    3. Enlarged aorta (HCC)  -     US RETROPERITONEAL LIMITED; Future  4. Chronic bilateral low back pain with left-sided sciatica  -     baclofen (LIORESAL) 10 MG tablet; Take 1 tablet by mouth nightly as needed (muscle spasms), Disp-90 tablet, R-0Normal  5. Fatigue, unspecified type  Assessment & Plan:   Stable continue CPAP at night. 6. Essential hypertension  Assessment & Plan:   well controlled, no significant medication side effects noted and continue Ramipril 2.5 mg capsule.   -Discussed taking medication and directed every day. -Discussed exercising daily 30 minutes 5 times a week for 150 minutes weekly.  -Discussed weight reduction if needed.  -Discussed low sodium diet.  -Discussed limiting caffeine consumption and tobacco cessation and the effects they have on the heart and blood pressure. Orders:  -     CBC with Auto Differential; Future  -     Comprehensive Metabolic Panel; Future  -     Lipid Panel; Future  7. Personal history of colon cancer  -     CEA; Future  8. Screening PSA (prostate specific antigen)  -     PSA Screening; Future           Discussed smoking cessation and Discussed taking medications as directed and adverse effects    No follow-ups on file.      HPI:   He received dental extractions and has temporary dentures and he couldn't eat as hypertrophy    He has history of colon cancer last colonoscopy last year at [facility]. Due for CEA and PSA with fasting labs     Current Outpatient Medications:     baclofen (LIORESAL) 10 MG tablet, Take 1 tablet by mouth nightly as needed (muscle spasms), Disp: 90 tablet, Rfl: 0    prednisoLONE acetate (PRED FORTE) 1 % ophthalmic suspension, prednisolone acetate 1 % eye drops,suspension, Disp: , Rfl:     Omega-3 Fatty Acids (FISH OIL) 1000 MG CAPS, Take 3,000 mg by mouth 3 times daily, Disp: , Rfl:     Multiple Vitamins-Minerals (ICAPS AREDS FORMULA PO), Take by mouth, Disp: , Rfl:     Gluc-Chonn-MSM-Boswellia-Vit D (GLUCOSAMINE CHONDROITIN + D3 PO), Take 1 tablet by mouth daily, Disp: , Rfl:     ANORO ELLIPTA 62.5-25 MCG/INH AEPB inhaler, Inhale 1 puff into the lungs daily One puff inhalation daily, Disp: 3 each, Rfl: 3    ramipril (ALTACE) 2.5 MG capsule, Take 1 capsule by mouth daily, Disp: 90 capsule, Rfl: 3    omeprazole (PRILOSEC) 40 MG delayed release capsule, Take 1 capsule by mouth daily, Disp: 90 capsule, Rfl: 3    albuterol sulfate HFA (PROAIR HFA) 108 (90 Base) MCG/ACT inhaler, Inhale 2 puffs into the lungs every 6 hours as needed for Wheezing, Disp: 1 Inhaler, Rfl: 0      Surgical History:  has a past surgical history that includes Splenectomy; Hemorrhoid surgery; Tonsillectomy and adenoidectomy; Cardiac catheterization; and eye surgery (Left, 03/2022). Social History:  reports that he has been smoking cigarettes. He started smoking about 60 years ago. He has a 60.00 pack-year smoking history. He has never used smokeless tobacco. He reports current alcohol use. He reports that he does not use drugs. Family History: family history includes Cancer in his father, mother, and sister.   I have reviewed Tyrese's allergies, medications, problem list, medical, social and family history and have updated as needed in the electronic medical record    Review of Systems   Constitutional: Negative for activity change, appetite change, chills, diaphoresis, fatigue, fever and unexpected weight change. HENT: Negative for congestion, dental problem, drooling, ear discharge, ear pain, facial swelling, hearing loss, mouth sores, nosebleeds, postnasal drip, rhinorrhea, sinus pressure, sinus pain, sneezing, sore throat, tinnitus, trouble swallowing and voice change. Eyes: Negative for visual disturbance. Respiratory: Negative for cough, chest tightness, shortness of breath and wheezing. Cardiovascular: Negative for chest pain, palpitations and leg swelling. Gastrointestinal: Negative for abdominal pain, constipation, diarrhea, nausea and vomiting. Endocrine: Negative for cold intolerance, heat intolerance, polydipsia, polyphagia and polyuria. Genitourinary: Negative for difficulty urinating, frequency and urgency. Musculoskeletal: Positive for arthralgias and back pain. Negative for gait problem, joint swelling, myalgias, neck pain and neck stiffness. Skin: Negative for color change, pallor, rash and wound. Allergic/Immunologic: Negative for environmental allergies, food allergies and immunocompromised state. Neurological: Negative for dizziness, tremors, seizures, syncope, facial asymmetry, speech difficulty, weakness, light-headedness, numbness and headaches. Hematological: Negative for adenopathy. Does not bruise/bleed easily. Psychiatric/Behavioral: Negative for agitation, behavioral problems, confusion, decreased concentration, dysphoric mood, hallucinations, self-injury, sleep disturbance and suicidal ideas. The patient is not nervous/anxious and is not hyperactive.         OBJECTIVE:     VS:  Wt Readings from Last 3 Encounters:   06/30/22 179 lb (81.2 kg)   03/30/22 189 lb 3.2 oz (85.8 kg)   12/29/21 189 lb 3.2 oz (85.8 kg)                       Vitals:    06/30/22 0738   BP: 116/70   Pulse: 65   Resp: 16   Temp: 97.9 °F (36.6 °C)   SpO2: 97%   Weight: 179 lb (81.2 kg)   Height: 6' (1.829 m) General: Alert and oriented to person, place, and time, well developed and well nourished, in no acute distress  SKIN: Warm and dry, intact without any rash, masses or lesions  HEAD: normocephalic, atraumatic  Eyes: sclera/conjunctiva clear, PERRLA, EOMI's intact  ENT: tympanic membranes, external ear and ear canal normal bilaterally, normal hearing, Nose without deformity, nasal mucosa and turbinates normal without polyps   Throat: clear, tongue midline, Mallampati score 4+, no  drainage, no masses or lesions noted, temporary dentures  Neck: supple and non-tender without mass, trachea midline, no cervical lymphadenopathy, no bruit, no thyromegaly or nodules  Cardiovascular: regular rate and regular rhythm, normal S1 and S2,  no murmurs, rubs, clicks, or gallop. Distal pulses intact, no carotid bruits. No edema  Pulmonary/Chest: clear to auscultation bilaterally, no wheezes, rales or rhonchi, normal air movement, no respiratory distress  Abdomen: soft, non-tender, non-distended, normal bowel sounds, no masses or hepatosplenomegaly  Musculoskeletal:  Normal ROM, however with right shoulder significantly lower than the left, left convex scoliosis lumbar noted,  no joint swelling,  or tenderness   Neurologic: reflexes normal and symmetric, no cranial nerve deficit, gait, coordination and speech normal  Extremities: no clubbing, cyanosis, or edema. Psychiatric: Good eye contact, normal mood and affect, answers questions appropriately    I have reviewed my findings and recommendations with Kimmie Hoffman.     Tay Marsh, APRN - CNP, NP-C, FNP-BC

## 2022-10-31 DIAGNOSIS — Z85.038 PERSONAL HISTORY OF COLON CANCER: ICD-10-CM

## 2022-10-31 DIAGNOSIS — I10 ESSENTIAL HYPERTENSION: ICD-10-CM

## 2022-10-31 DIAGNOSIS — Z12.5 SCREENING PSA (PROSTATE SPECIFIC ANTIGEN): ICD-10-CM

## 2022-10-31 LAB
ALBUMIN SERPL-MCNC: 4.3 G/DL (ref 3.5–5.2)
ALP BLD-CCNC: 70 U/L (ref 40–129)
ALT SERPL-CCNC: 10 U/L (ref 0–40)
ANION GAP SERPL CALCULATED.3IONS-SCNC: 11 MMOL/L (ref 7–16)
AST SERPL-CCNC: 14 U/L (ref 0–39)
BASOPHILS ABSOLUTE: 0.07 E9/L (ref 0–0.2)
BASOPHILS RELATIVE PERCENT: 1 % (ref 0–2)
BILIRUB SERPL-MCNC: 0.4 MG/DL (ref 0–1.2)
BUN BLDV-MCNC: 14 MG/DL (ref 6–23)
CALCIUM SERPL-MCNC: 9.5 MG/DL (ref 8.6–10.2)
CEA: 3.5 NG/ML (ref 0–5.2)
CHLORIDE BLD-SCNC: 100 MMOL/L (ref 98–107)
CHOLESTEROL, TOTAL: 173 MG/DL (ref 0–199)
CO2: 27 MMOL/L (ref 22–29)
CREAT SERPL-MCNC: 1 MG/DL (ref 0.7–1.2)
EOSINOPHILS ABSOLUTE: 0.22 E9/L (ref 0.05–0.5)
EOSINOPHILS RELATIVE PERCENT: 3.2 % (ref 0–6)
GFR SERPL CREATININE-BSD FRML MDRD: >60 ML/MIN/1.73
GLUCOSE BLD-MCNC: 93 MG/DL (ref 74–99)
HCT VFR BLD CALC: 42.7 % (ref 37–54)
HDLC SERPL-MCNC: 38 MG/DL
HEMOGLOBIN: 14.3 G/DL (ref 12.5–16.5)
IMMATURE GRANULOCYTES #: 0.02 E9/L
IMMATURE GRANULOCYTES %: 0.3 % (ref 0–5)
LDL CHOLESTEROL CALCULATED: 115 MG/DL (ref 0–99)
LYMPHOCYTES ABSOLUTE: 2.26 E9/L (ref 1.5–4)
LYMPHOCYTES RELATIVE PERCENT: 32.7 % (ref 20–42)
MCH RBC QN AUTO: 31.2 PG (ref 26–35)
MCHC RBC AUTO-ENTMCNC: 33.5 % (ref 32–34.5)
MCV RBC AUTO: 93.2 FL (ref 80–99.9)
MONOCYTES ABSOLUTE: 0.81 E9/L (ref 0.1–0.95)
MONOCYTES RELATIVE PERCENT: 11.7 % (ref 2–12)
NEUTROPHILS ABSOLUTE: 3.53 E9/L (ref 1.8–7.3)
NEUTROPHILS RELATIVE PERCENT: 51.1 % (ref 43–80)
PDW BLD-RTO: 14.5 FL (ref 11.5–15)
PLATELET # BLD: 323 E9/L (ref 130–450)
PMV BLD AUTO: 9.7 FL (ref 7–12)
POTASSIUM SERPL-SCNC: 4.7 MMOL/L (ref 3.5–5)
PROSTATE SPECIFIC ANTIGEN: 1.23 NG/ML (ref 0–4)
RBC # BLD: 4.58 E12/L (ref 3.8–5.8)
SODIUM BLD-SCNC: 138 MMOL/L (ref 132–146)
TOTAL PROTEIN: 6.7 G/DL (ref 6.4–8.3)
TRIGL SERPL-MCNC: 102 MG/DL (ref 0–149)
VLDLC SERPL CALC-MCNC: 20 MG/DL
WBC # BLD: 6.9 E9/L (ref 4.5–11.5)

## 2022-11-07 ENCOUNTER — OFFICE VISIT (OUTPATIENT)
Dept: FAMILY MEDICINE CLINIC | Age: 72
End: 2022-11-07
Payer: MEDICARE

## 2022-11-07 VITALS
SYSTOLIC BLOOD PRESSURE: 110 MMHG | OXYGEN SATURATION: 98 % | HEIGHT: 72 IN | RESPIRATION RATE: 16 BRPM | BODY MASS INDEX: 23.7 KG/M2 | HEART RATE: 55 BPM | TEMPERATURE: 97.6 F | WEIGHT: 175 LBS | DIASTOLIC BLOOD PRESSURE: 62 MMHG

## 2022-11-07 DIAGNOSIS — Z23 FLU VACCINE NEED: ICD-10-CM

## 2022-11-07 DIAGNOSIS — Z00.00 MEDICARE ANNUAL WELLNESS VISIT, SUBSEQUENT: Primary | ICD-10-CM

## 2022-11-07 DIAGNOSIS — J43.2 CENTRILOBULAR EMPHYSEMA (HCC): ICD-10-CM

## 2022-11-07 DIAGNOSIS — I10 ESSENTIAL HYPERTENSION: ICD-10-CM

## 2022-11-07 DIAGNOSIS — Z23 NEED FOR SHINGLES VACCINE: ICD-10-CM

## 2022-11-07 DIAGNOSIS — K22.70 BARRETT'S ESOPHAGUS DETERMINED BY ENDOSCOPY: ICD-10-CM

## 2022-11-07 DIAGNOSIS — Z23 NEED FOR MENINGOCOCCAL VACCINATION: ICD-10-CM

## 2022-11-07 DIAGNOSIS — Z87.891 PERSONAL HISTORY OF TOBACCO USE, PRESENTING HAZARDS TO HEALTH: ICD-10-CM

## 2022-11-07 PROCEDURE — G0439 PPPS, SUBSEQ VISIT: HCPCS | Performed by: NURSE PRACTITIONER

## 2022-11-07 PROCEDURE — 99406 BEHAV CHNG SMOKING 3-10 MIN: CPT | Performed by: NURSE PRACTITIONER

## 2022-11-07 PROCEDURE — 3074F SYST BP LT 130 MM HG: CPT | Performed by: NURSE PRACTITIONER

## 2022-11-07 PROCEDURE — G0008 ADMIN INFLUENZA VIRUS VAC: HCPCS | Performed by: NURSE PRACTITIONER

## 2022-11-07 PROCEDURE — 3078F DIAST BP <80 MM HG: CPT | Performed by: NURSE PRACTITIONER

## 2022-11-07 PROCEDURE — 1123F ACP DISCUSS/DSCN MKR DOCD: CPT | Performed by: NURSE PRACTITIONER

## 2022-11-07 PROCEDURE — 90694 VACC AIIV4 NO PRSRV 0.5ML IM: CPT | Performed by: NURSE PRACTITIONER

## 2022-11-07 RX ORDER — RAMIPRIL 2.5 MG/1
2.5 CAPSULE ORAL DAILY
Qty: 90 CAPSULE | Refills: 3 | Status: ON HOLD
Start: 2022-11-07 | End: 2022-11-18 | Stop reason: HOSPADM

## 2022-11-07 RX ORDER — ALBUTEROL SULFATE 90 UG/1
2 AEROSOL, METERED RESPIRATORY (INHALATION) EVERY 6 HOURS PRN
Qty: 1 EACH | Refills: 0 | Status: SHIPPED | OUTPATIENT
Start: 2022-11-07

## 2022-11-07 RX ORDER — ZOSTER VACCINE RECOMBINANT, ADJUVANTED 50 MCG/0.5
0.5 KIT INTRAMUSCULAR SEE ADMIN INSTRUCTIONS
Qty: 0.5 ML | Refills: 1 | Status: ON HOLD | OUTPATIENT
Start: 2022-11-07 | End: 2022-11-18 | Stop reason: HOSPADM

## 2022-11-07 RX ORDER — BACLOFEN 10 MG/1
10 TABLET ORAL NIGHTLY PRN
Qty: 90 TABLET | Refills: 0 | Status: CANCELLED | OUTPATIENT
Start: 2022-11-07

## 2022-11-07 RX ORDER — OMEPRAZOLE 40 MG/1
40 CAPSULE, DELAYED RELEASE ORAL DAILY
Qty: 90 CAPSULE | Refills: 3 | Status: SHIPPED | OUTPATIENT
Start: 2022-11-07

## 2022-11-07 SDOH — ECONOMIC STABILITY: FOOD INSECURITY: WITHIN THE PAST 12 MONTHS, YOU WORRIED THAT YOUR FOOD WOULD RUN OUT BEFORE YOU GOT MONEY TO BUY MORE.: NEVER TRUE

## 2022-11-07 SDOH — ECONOMIC STABILITY: INCOME INSECURITY: IN THE LAST 12 MONTHS, WAS THERE A TIME WHEN YOU WERE NOT ABLE TO PAY THE MORTGAGE OR RENT ON TIME?: NO

## 2022-11-07 SDOH — ECONOMIC STABILITY: FOOD INSECURITY: WITHIN THE PAST 12 MONTHS, THE FOOD YOU BOUGHT JUST DIDN'T LAST AND YOU DIDN'T HAVE MONEY TO GET MORE.: NEVER TRUE

## 2022-11-07 ASSESSMENT — PATIENT HEALTH QUESTIONNAIRE - PHQ9
1. LITTLE INTEREST OR PLEASURE IN DOING THINGS: 0
SUM OF ALL RESPONSES TO PHQ9 QUESTIONS 1 & 2: 0
SUM OF ALL RESPONSES TO PHQ QUESTIONS 1-9: 0
2. FEELING DOWN, DEPRESSED OR HOPELESS: 0

## 2022-11-07 ASSESSMENT — LIFESTYLE VARIABLES
HOW OFTEN DO YOU HAVE A DRINK CONTAINING ALCOHOL: NEVER
HOW MANY STANDARD DRINKS CONTAINING ALCOHOL DO YOU HAVE ON A TYPICAL DAY: PATIENT DOES NOT DRINK

## 2022-11-07 ASSESSMENT — SOCIAL DETERMINANTS OF HEALTH (SDOH): HOW HARD IS IT FOR YOU TO PAY FOR THE VERY BASICS LIKE FOOD, HOUSING, MEDICAL CARE, AND HEATING?: NOT HARD AT ALL

## 2022-11-07 NOTE — ASSESSMENT & PLAN NOTE
In 1 week get the [place] vaccine wait 2 weeks to get the [facility] booster, then shingles vaccine after the new year.

## 2022-11-07 NOTE — PATIENT INSTRUCTIONS
Quitting Tobacco: Care Instructions  Quitting tobacco is much harder than simply changing a habit. Nicotine cravings make it hard to quit, but you can do it. Your doctor will help you set up the plan that best meets your needs. You will miss the nicotine at first. You may feel short-tempered and grumpy. You may have trouble sleeping or thinking clearly. The urge to use tobacco may continue for a time. Combining tools can raise your chances of success. You can use medicine along with counseling. And you can join a quit-tobacco program, such as the American Lung Association's Freedom from Smoking program.    Get support. Reach out to family and friends, and find a counselor to help you quit. Join a support group, such as Nicotine Anonymous. Go to www.smokefree. gov to sign up for text messaging support. Talk to your doctor or pharmacist about medicines that can help you quit. Medicines can help with cravings and withdrawal symptoms. There are several over-the-counter choices, such as nicotine patches, gum, and lozenges. After you quit, do not use tobacco again, not even once. Get rid of all tobacco products and anything that reminds you of tobacco, such as ashtrays. Avoid things that make you reach for tobacco.  Change your daily routine. Take a different route to work, or eat a meal in a different place. Try to cut down on stress. Find ways to calm yourself, such as taking a hot bath or doing deep breathing exercises. Eat a healthy diet, and get regular exercise. Having healthy habits may help you quit using tobacco.     Don't give up on quitting if you use tobacco again. Most people quit and restart a few times before they quit for good. Follow-up care is a key part of your treatment and safety. Be sure to make and go to all appointments, and call your doctor if you are having problems. It's also a good idea to know your test results and keep a list of the medicines you take.   Where can you learn more? Go to https://chpepiceweb.healthStartupxplore. org and sign in to your [employer] account. Enter L648 in the [facility] box to learn more about \"Quitting Tobacco: Care Instructions. \"     If you do not have an account, please click on the \"Sign Up Now\" link. Current as of: November 8, 2021               Content Version: 13.4  © 2006-2022 [employer]. Care instructions adapted under license by [facility] ([facility]). If you have questions about a medical condition or this instruction, always ask your healthcare professional. [address] any warranty or liability for your use of this information. Learning About Benefits From Quitting Smoking  Why is it important to quit smoking? If you're thinking about quitting smoking, you may have a few reasons to be smoke-free. Your health may be one of them. When you quit smoking, you lower your risk for many serious health problems, such as cancer, lung disease, heart attack, stroke, blood vessel disease, and blindness from macular degeneration. When you're smoke-free, you get sick less often, and you heal faster. You are less likely to get colds, flu, bronchitis, and pneumonia. As a nonsmoker, you may find that your mood is better and you are less stressed. When and how will you feel healthier? Quitting has real health benefits that start from day 1 of being smoke-free. And the longer you stay smoke-free, the healthier you get and the better you feel. The first hours  After just 20 minutes, your blood pressure and heart rate go down. That means there's less stress on your heart and blood vessels. Within 12 hours, the level of carbon monoxide in your blood drops back to normal. That makes room for more oxygen. With more oxygen in your body, you may notice that you have more energy than when you smoked. After 2 weeks  Your lungs start to work better. Your risk of heart attack starts to drop.   After 1 month  When your lungs are clear, you cough less and breathe deeper, so it's easier to be active. Your sense of taste and smell return. That means you can enjoy food more than you have since you started smoking. Over the years  Over the years, your risks of heart disease, heart attack, and stroke are lower. After 10 years, your risk of dying from lung cancer is cut by about half. And your risk for many other types of cancer is lower too. How would quitting help others in your life? When you quit smoking, you improve the health of everyone who now breathes in your smoke. Their heart, lung, and cancer risks drop, much like yours. They are sick less. For babies and small children, living smoke-free means they're less likely to have ear infections, pneumonia, and bronchitis. If you're a woman who is or will be pregnant someday, quitting smoking means a healthier . Children who are close to you are less likely to become adult smokers. Where can you learn more? Go to https://[employer] Network.[employer]. org and sign in to your [employer] account. Enter [id number] [id number] [phone] in the [facility] box to learn more about \"Learning About Benefits From Quitting Smoking. \"     If you do not have an account, please click on the \"Sign Up Now\" link. Current as of: 2021               Content Version: 13.4  © 9227-9936 Healthwise, Incorporated. Care instructions adapted under license by [facility] ([facility]). If you have questions about a medical condition or this instruction, always ask your healthcare professional. [place] [address] any warranty or liability for your use of this information. Personalized Preventive Plan for [place] - 2022  Medicare offers a range of preventive health benefits. Some of the tests and screenings are paid in full while other may be subject to a deductible, co-insurance, and/or copay.     Some of these benefits include a comprehensive review of your medical history including lifestyle, illnesses that may run in your family, and various assessments and screenings as appropriate. After reviewing your medical record and screening and assessments performed today your provider may have ordered immunizations, labs, imaging, and/or referrals for you. A list of these orders (if applicable) as well as your Preventive Care list are included within your After Visit Summary for your review. Other Preventive Recommendations:    A preventive eye exam performed by an eye specialist is recommended every 1-2 years to screen for glaucoma; cataracts, macular degeneration, and other eye disorders. A preventive dental visit is recommended every 6 months. Try to get at least 150 minutes of exercise per week or 10,000 steps per day on a pedometer . Order or download the FREE \"Exercise & Physical Activity: Your Everyday Guide\" from [employer]. Call 8-278.806.6408 or search [employer] online. You need 2313-8290 mg of calcium and 1212-2895 IU of vitamin D per day. It is possible to meet your calcium requirement with diet alone, but a vitamin D supplement is usually necessary to meet this goal.  When exposed to the sun, use a sunscreen that protects against both UVA and UVB radiation with an SPF of 30 or greater. Reapply every 2 to 3 hours or after sweating, drying off with a towel, or swimming. Always wear a seat belt when traveling in a car. Always wear a helmet when riding a bicycle or motorcycle.

## 2022-11-07 NOTE — ASSESSMENT & PLAN NOTE
.shinglesvacc wait until after the first of the year for this vaccine. · A sore arm with mild or moderate pain is very common after recombinant shingles vaccine, affecting about 80% of vaccinated people. Redness and swelling can also happen at the site of the injection. · Tiredness, muscle pain, headache, shivering, fever, stomach pain, and nausea happen after vaccination in more than half of people who receive recombinant shingles vaccine.

## 2022-11-07 NOTE — PROGRESS NOTES
Medicare Annual Wellness Visit    Kesha Espinal is here for Medicare AWV and Health Maintenance (Due for low dose ct, menigococcal, shingles)    Assessment & Plan   Medicare annual wellness visit, subsequent  Need for meningococcal vaccination  Assessment & Plan:   In 1 week get the [place] vaccine wait 2 weeks to get the [facility] booster, then shingles vaccine after the new year. Orders:  -     Meningococcal B Vac, Recomb, ROSA MARIA; Inject 0.5 mLs into the muscle once for 1 dose, Disp-0.5 mL, R-0Print  Need for shingles vaccine  Assessment & Plan:   .shinglesvacc wait until after the first of the year for this vaccine. A sore arm with mild or moderate pain is very common after recombinant shingles vaccine, affecting about 80% of vaccinated people. Redness and swelling can also happen at the site of the injection. Tiredness, muscle pain, headache, shivering, fever, stomach pain, and nausea happen after vaccination in more than half of people who receive recombinant shingles vaccine. Orders:  -     zoster recombinant adjuvanted vaccine [facility]) 50 MCG/0.5ML SUSR injection; Inject 0.5 mLs into the muscle See Admin Instructions 1 dose now and repeat in 2-6 months, Disp-0.5 mL, R-1Print  Flu vaccine need  Assessment & Plan:   Wash your hands regularly, and keep your hands away from your face. Cover your mouth when you cough or sneeze. Rest, plenty of fluids, Tylenol for body aches, fever. Stay home from school, work, and other public places until you are feeling better and your fever has been gone for at least 24 hours. The fever needs to have gone away on its own without the help of medicine. Ask people living with you to talk to their doctors about preventing the flu. They may get antiviral medicine to keep from getting the flu from you. To prevent the flu in the future, get a flu vaccine every fall. Encourage people living with you to get the vaccine.       Flu vaccine given today  Personal history of tobacco use, presenting hazards to health  -     VT TOBACCO USE CESSATION INTERMEDIATE 3-10 MINUTES [01061]  Centrilobular emphysema (HCC)  -     albuterol sulfate HFA (PROAIR HFA) 108 (90 Base) MCG/ACT inhaler; Inhale 2 puffs into the lungs every 6 hours as needed for Wheezing, Disp-1 each, R-0Normal  Essential hypertension  -     ramipril (ALTACE) 2.5 MG capsule; Take 1 capsule by mouth daily, Disp-90 capsule, R-3Normal  [facility] esophagus determined by endoscopy  -     omeprazole (PRILOSEC) 40 MG delayed release capsule; Take 1 capsule by mouth daily, Disp-90 capsule, R-3Normal    Recommendations for Preventive Services Due: see orders and patient instructions/AVS.  Recommended screening schedule for the next 5-10 years is provided to the patient in written form: see Patient Instructions/AVS.     Return in 3 months (on 2/7/2023) for HTN, GERD  and then medicare well visit in 1 year. Subjective       Patient's complete Health Risk Assessment and screening values have been reviewed and are found in Flowsheets. The following problems were reviewed today and where indicated follow up appointments were made and/or referrals ordered.     Positive Risk Factor Screenings with Interventions:       Tobacco Use:  Tobacco Use: High Risk    Smoking Tobacco Use: Some Days    Smokeless Tobacco Use: Never    Passive Exposure: Not on file     E-cigarette/Vaping       Questions Responses    E-cigarette/Vaping Use     Start Date     Passive Exposure     Quit Date     Counseling Given     Comments           Substance Use - Tobacco Interventions:  Has cut down on smoking states he doesn't smoke nearly like he used to and follows with pulmonology with up coming appt [facility] Dr Isabela Moore, provider spent 3 minutes counseling patient            Safety:  Do you have working smoke detectors?: Yes  Do you have any tripping hazards - loose or unsecured carpets or rugs?: No  Do you have any tripping hazards - clutter in doorways, halls, or stairs?: No  Do you have either shower bars, grab bars, non-slip mats or non-slip surfaces in your shower or bathtub?: (!) No  Do all of your stairways have a railing or banister?: Yes  Do you always fasten your seatbelt when you are in a car?: (!) No  Safety Interventions:  Home safety tips provided  Patient declines any further evaluation/treatment for this issue           Objective   Vitals:    11/07/22 0800   BP: 110/62   Pulse: 55   Resp: 16   Temp: 97.6 °F (36.4 °C)   SpO2: 98%   Weight: 175 lb (79.4 kg)   Height: 6' (1.829 m)      Body mass index is 23.73 kg/m².       General Appearance: alert and oriented to person, place and time, well developed and well- nourished, in no acute distress  Skin: warm and dry, no rash or erythema  Head: normocephalic and atraumatic  Eyes: pupils equal, round, and reactive to light, extraocular eye movements intact, conjunctivae normal  ENT: tympanic membrane, external ear and ear canal normal bilaterally, nose without deformity, nasal mucosa and turbinates normal without polyps  Neck: supple and non-tender without mass, no thyromegaly or thyroid nodules, no cervical lymphadenopathy  Pulmonary/Chest: clear to auscultation bilaterally- no wheezes, rales or rhonchi, normal air movement, no respiratory distress  Cardiovascular: normal rate, regular rhythm, normal S1 and S2, no murmurs, rubs, clicks, or gallops, distal pulses intact, no carotid bruits  Abdomen: soft, non-tender, non-distended, normal bowel sounds, no masses or organomegaly  Genitourinary/Rectal: genitals normal without hernia or inguinal adenopathy, rectal normal without masses, prostate normal in size without masses or nodules  Extremities: no cyanosis, clubbing or edema  Musculoskeletal: normal range of motion, no joint swelling, deformity or tenderness  Neurologic: reflexes normal and symmetric, no cranial nerve deficit, gait, coordination and speech normal       Allergies   Allergen Reactions Penicillins Hives and Swelling     Prior to Visit Medications    Medication Sig Taking?  Authorizing Provider   Hyprom-Naphaz-Polysorb-Zn Sulf (CLEAR EYES COMPLETE OP) Apply to eye Yes Historical Provider, MD   albuterol sulfate HFA (PROAIR HFA) 108 (90 Base) MCG/ACT inhaler Inhale 2 puffs into the lungs every 6 hours as needed for Wheezing Yes CAITLIN Cole CNP   ramipril (ALTACE) 2.5 MG capsule Take 1 capsule by mouth daily Yes CAITLIN Lloyd CNP   omeprazole (PRILOSEC) 40 MG delayed release capsule Take 1 capsule by mouth daily Yes CAITLIN Lloyd CNP   Meningococcal B Vac, Recomb, ROSA MARIA Inject 0.5 mLs into the muscle once for 1 dose Yes CAITLIN Lloyd CNP   zoster recombinant adjuvanted vaccine [facility]) 50 MCG/0.5ML SUSR injection Inject 0.5 mLs into the muscle See Admin Instructions 1 dose now and repeat in 2-6 months Yes CAITLIN Lloyd CNP   baclofen (LIORESAL) 10 MG tablet Take 1 tablet by mouth nightly as needed (muscle spasms) Yes CAITLIN Lloyd CNP   Omega-3 Fatty Acids (FISH OIL) 1000 MG CAPS Take 3,000 mg by mouth 3 times daily Yes Historical Provider, MD   Multiple Vitamins-Minerals (ICAPS AREDS FORMULA PO) Take by mouth Yes Historical Provider, MD   Gluc-Chonn-MSM-Boswellia-Vit D (GLUCOSAMINE CHONDROITIN + D3 PO) Take 1 tablet by mouth daily Yes Historical Provider, MD Clarke Cogan ELLIPTA 62.5-25 MCG/INH AEPB inhaler Inhale 1 puff into the lungs daily One puff inhalation daily Yes CAITLIN Cole CNP       CareTeam (Including outside providers/suppliers regularly involved in providing care):   Patient Care Team:  CAITLIN Lloyd CNP as PCP - General (Nurse Practitioner)  CAITLIN Lloyd CNP as PCP - [facility] Empaneled Provider     Reviewed and updated this visit:  Tobacco  Allergies  Meds  Problems  Med Hx  Surg Hx  Soc Hx  Fam Hx               Cardiovascular Disease Risk Counseling: Assessed the patient's risk to develop cardiovascular disease and reviewed main risk factors. Reviewed steps to reduce disease risk including:   Quitting tobacco use, reducing amount smoked, or not starting the habit  Making healthy food choices  Being physically active and gradualy increasing activity levels   Reduce weight and determine a healthy BMI goal  Monitor blood pressure and treat if higher than 140/90 mmHg  Maintain blood total cholesterol levels under 5 mmol/l or 190 mg/dl  Maintain LDL cholesterol levels under 3.0 mmol/l or 115 mg/dl   Control blood glucose levels  Consider taking aspirin (75 mg daily), once blood pressure is controlled   Provided a follow up plan. Time spent (minutes): 10  Tobacco Cessation Counseling: Patient advised about behavior change, including information about personal health harms, usage of appropriate cessation measures and benefits of cessation.   Time spent (minutes): 3

## 2022-11-11 ENCOUNTER — HOSPITAL ENCOUNTER (INPATIENT)
Age: 72
LOS: 7 days | Discharge: HOME | DRG: 329 | End: 2022-11-18
Attending: STUDENT IN AN ORGANIZED HEALTH CARE EDUCATION/TRAINING PROGRAM | Admitting: SURGERY
Payer: MEDICARE

## 2022-11-11 ENCOUNTER — APPOINTMENT (OUTPATIENT)
Dept: GENERAL RADIOLOGY | Age: 72
DRG: 329 | End: 2022-11-11
Payer: MEDICARE

## 2022-11-11 ENCOUNTER — APPOINTMENT (OUTPATIENT)
Dept: CT IMAGING | Age: 72
DRG: 329 | End: 2022-11-11
Payer: MEDICARE

## 2022-11-11 DIAGNOSIS — K56.51 INTESTINAL ADHESIONS WITH PARTIAL OBSTRUCTION (HCC): ICD-10-CM

## 2022-11-11 DIAGNOSIS — K56.609 INTESTINAL OBSTRUCTION, UNSPECIFIED CAUSE, UNSPECIFIED WHETHER PARTIAL OR COMPLETE (HCC): Primary | ICD-10-CM

## 2022-11-11 DIAGNOSIS — K56.609 SBO (SMALL BOWEL OBSTRUCTION) (HCC): ICD-10-CM

## 2022-11-11 LAB
ALBUMIN SERPL-MCNC: 4.3 G/DL (ref 3.5–5.2)
ALP BLD-CCNC: 86 U/L (ref 40–129)
ALT SERPL-CCNC: 15 U/L (ref 0–40)
AMORPHOUS: ABNORMAL
ANION GAP SERPL CALCULATED.3IONS-SCNC: 12 MMOL/L (ref 7–16)
AST SERPL-CCNC: 15 U/L (ref 0–39)
BACTERIA: ABNORMAL /HPF
BASOPHILS ABSOLUTE: 0 E9/L (ref 0–0.2)
BASOPHILS RELATIVE PERCENT: 0 % (ref 0–2)
BILIRUB SERPL-MCNC: 0.3 MG/DL (ref 0–1.2)
BILIRUBIN URINE: NEGATIVE
BLOOD, URINE: NEGATIVE
BUN BLDV-MCNC: 17 MG/DL (ref 6–23)
BURR CELLS: ABNORMAL
CALCIUM SERPL-MCNC: 9.7 MG/DL (ref 8.6–10.2)
CHLORIDE BLD-SCNC: 100 MMOL/L (ref 98–107)
CLARITY: CLEAR
CO2: 25 MMOL/L (ref 22–29)
COLOR: YELLOW
CREAT SERPL-MCNC: 0.9 MG/DL (ref 0.7–1.2)
EKG ATRIAL RATE: 63 BPM
EKG P AXIS: 85 DEGREES
EKG P-R INTERVAL: 180 MS
EKG Q-T INTERVAL: 406 MS
EKG QRS DURATION: 112 MS
EKG QTC CALCULATION (BAZETT): 415 MS
EKG R AXIS: -166 DEGREES
EKG T AXIS: 59 DEGREES
EKG VENTRICULAR RATE: 63 BPM
EOSINOPHILS ABSOLUTE: 0 E9/L (ref 0.05–0.5)
EOSINOPHILS RELATIVE PERCENT: 0 % (ref 0–6)
EPITHELIAL CELLS, UA: ABNORMAL /HPF
GFR SERPL CREATININE-BSD FRML MDRD: >60 ML/MIN/1.73
GLUCOSE BLD-MCNC: 110 MG/DL (ref 74–99)
GLUCOSE URINE: NEGATIVE MG/DL
HCT VFR BLD CALC: 44.5 % (ref 37–54)
HEMOGLOBIN: 14.7 G/DL (ref 12.5–16.5)
KETONES, URINE: NEGATIVE MG/DL
LACTIC ACID: 1 MMOL/L (ref 0.5–2.2)
LEUKOCYTE ESTERASE, URINE: NEGATIVE
LIPASE: 24 U/L (ref 13–60)
LYMPHOCYTES ABSOLUTE: 1.7 E9/L (ref 1.5–4)
LYMPHOCYTES RELATIVE PERCENT: 12 % (ref 20–42)
MCH RBC QN AUTO: 30.6 PG (ref 26–35)
MCHC RBC AUTO-ENTMCNC: 33 % (ref 32–34.5)
MCV RBC AUTO: 92.5 FL (ref 80–99.9)
MONOCYTES ABSOLUTE: 0.85 E9/L (ref 0.1–0.95)
MONOCYTES RELATIVE PERCENT: 6 % (ref 2–12)
NEUTROPHILS ABSOLUTE: 11.64 E9/L (ref 1.8–7.3)
NEUTROPHILS RELATIVE PERCENT: 82 % (ref 43–80)
NITRITE, URINE: NEGATIVE
PDW BLD-RTO: 14.1 FL (ref 11.5–15)
PH UA: 6 (ref 5–9)
PLATELET # BLD: 340 E9/L (ref 130–450)
PMV BLD AUTO: 9.3 FL (ref 7–12)
POIKILOCYTES: ABNORMAL
POTASSIUM REFLEX MAGNESIUM: 4.7 MMOL/L (ref 3.5–5)
PROTEIN UA: ABNORMAL MG/DL
RBC # BLD: 4.81 E12/L (ref 3.8–5.8)
RBC UA: ABNORMAL /HPF (ref 0–2)
SODIUM BLD-SCNC: 137 MMOL/L (ref 132–146)
SPECIFIC GRAVITY UA: >=1.03 (ref 1–1.03)
TOTAL PROTEIN: 7 G/DL (ref 6.4–8.3)
TROPONIN, HIGH SENSITIVITY: 21 NG/L (ref 0–11)
TROPONIN, HIGH SENSITIVITY: 21 NG/L (ref 0–11)
UROBILINOGEN, URINE: 0.2 E.U./DL
WBC # BLD: 14.2 E9/L (ref 4.5–11.5)
WBC UA: ABNORMAL /HPF (ref 0–5)

## 2022-11-11 PROCEDURE — 80053 COMPREHEN METABOLIC PANEL: CPT

## 2022-11-11 PROCEDURE — 6360000004 HC RX CONTRAST MEDICATION: Performed by: RADIOLOGY

## 2022-11-11 PROCEDURE — 6360000002 HC RX W HCPCS: Performed by: STUDENT IN AN ORGANIZED HEALTH CARE EDUCATION/TRAINING PROGRAM

## 2022-11-11 PROCEDURE — 84484 ASSAY OF TROPONIN QUANT: CPT

## 2022-11-11 PROCEDURE — 74018 RADEX ABDOMEN 1 VIEW: CPT

## 2022-11-11 PROCEDURE — 83605 ASSAY OF LACTIC ACID: CPT

## 2022-11-11 PROCEDURE — 85025 COMPLETE CBC W/AUTO DIFF WBC: CPT

## 2022-11-11 PROCEDURE — 2580000003 HC RX 258: Performed by: STUDENT IN AN ORGANIZED HEALTH CARE EDUCATION/TRAINING PROGRAM

## 2022-11-11 PROCEDURE — 2580000003 HC RX 258

## 2022-11-11 PROCEDURE — 74177 CT ABD & PELVIS W/CONTRAST: CPT

## 2022-11-11 PROCEDURE — 99285 EMERGENCY DEPT VISIT HI MDM: CPT

## 2022-11-11 PROCEDURE — C9113 INJ PANTOPRAZOLE SODIUM, VIA: HCPCS | Performed by: STUDENT IN AN ORGANIZED HEALTH CARE EDUCATION/TRAINING PROGRAM

## 2022-11-11 PROCEDURE — 81001 URINALYSIS AUTO W/SCOPE: CPT

## 2022-11-11 PROCEDURE — A4216 STERILE WATER/SALINE, 10 ML: HCPCS | Performed by: STUDENT IN AN ORGANIZED HEALTH CARE EDUCATION/TRAINING PROGRAM

## 2022-11-11 PROCEDURE — 36415 COLL VENOUS BLD VENIPUNCTURE: CPT

## 2022-11-11 PROCEDURE — 83690 ASSAY OF LIPASE: CPT

## 2022-11-11 PROCEDURE — 96374 THER/PROPH/DIAG INJ IV PUSH: CPT

## 2022-11-11 PROCEDURE — 99223 1ST HOSP IP/OBS HIGH 75: CPT | Performed by: SURGERY

## 2022-11-11 PROCEDURE — 93010 ELECTROCARDIOGRAM REPORT: CPT | Performed by: INTERNAL MEDICINE

## 2022-11-11 PROCEDURE — 1200000000 HC SEMI PRIVATE

## 2022-11-11 PROCEDURE — 93005 ELECTROCARDIOGRAM TRACING: CPT | Performed by: STUDENT IN AN ORGANIZED HEALTH CARE EDUCATION/TRAINING PROGRAM

## 2022-11-11 RX ORDER — MORPHINE SULFATE 2 MG/ML
2 INJECTION, SOLUTION INTRAMUSCULAR; INTRAVENOUS
Status: DISCONTINUED | OUTPATIENT
Start: 2022-11-11 | End: 2022-11-17

## 2022-11-11 RX ORDER — ONDANSETRON 4 MG/1
4 TABLET, ORALLY DISINTEGRATING ORAL EVERY 8 HOURS PRN
Status: DISCONTINUED | OUTPATIENT
Start: 2022-11-11 | End: 2022-11-18 | Stop reason: HOSPADM

## 2022-11-11 RX ORDER — SODIUM CHLORIDE, SODIUM LACTATE, POTASSIUM CHLORIDE, CALCIUM CHLORIDE 600; 310; 30; 20 MG/100ML; MG/100ML; MG/100ML; MG/100ML
INJECTION, SOLUTION INTRAVENOUS
Status: COMPLETED
Start: 2022-11-11 | End: 2022-11-11

## 2022-11-11 RX ORDER — MORPHINE SULFATE 4 MG/ML
4 INJECTION, SOLUTION INTRAMUSCULAR; INTRAVENOUS ONCE
Status: COMPLETED | OUTPATIENT
Start: 2022-11-11 | End: 2022-11-11

## 2022-11-11 RX ORDER — ONDANSETRON 2 MG/ML
4 INJECTION INTRAMUSCULAR; INTRAVENOUS EVERY 6 HOURS PRN
Status: DISCONTINUED | OUTPATIENT
Start: 2022-11-11 | End: 2022-11-18 | Stop reason: HOSPADM

## 2022-11-11 RX ORDER — ACETAMINOPHEN 500 MG
500 TABLET ORAL EVERY 6 HOURS PRN
COMMUNITY

## 2022-11-11 RX ORDER — LIDOCAINE HYDROCHLORIDE 20 MG/ML
JELLY TOPICAL ONCE
Status: DISCONTINUED | OUTPATIENT
Start: 2022-11-11 | End: 2022-11-18 | Stop reason: HOSPADM

## 2022-11-11 RX ORDER — LABETALOL HYDROCHLORIDE 5 MG/ML
10 INJECTION, SOLUTION INTRAVENOUS EVERY 30 MIN PRN
Status: DISCONTINUED | OUTPATIENT
Start: 2022-11-11 | End: 2022-11-18 | Stop reason: HOSPADM

## 2022-11-11 RX ORDER — SODIUM CHLORIDE 9 MG/ML
INJECTION, SOLUTION INTRAVENOUS PRN
Status: DISCONTINUED | OUTPATIENT
Start: 2022-11-11 | End: 2022-11-18 | Stop reason: HOSPADM

## 2022-11-11 RX ORDER — HYDRALAZINE HYDROCHLORIDE 20 MG/ML
10 INJECTION INTRAMUSCULAR; INTRAVENOUS EVERY 30 MIN PRN
Status: DISCONTINUED | OUTPATIENT
Start: 2022-11-11 | End: 2022-11-12

## 2022-11-11 RX ORDER — ALBUTEROL SULFATE 90 UG/1
2 AEROSOL, METERED RESPIRATORY (INHALATION) EVERY 6 HOURS PRN
Status: DISCONTINUED | OUTPATIENT
Start: 2022-11-11 | End: 2022-11-18 | Stop reason: HOSPADM

## 2022-11-11 RX ORDER — SODIUM CHLORIDE 0.9 % (FLUSH) 0.9 %
10 SYRINGE (ML) INJECTION PRN
Status: DISCONTINUED | OUTPATIENT
Start: 2022-11-11 | End: 2022-11-18 | Stop reason: HOSPADM

## 2022-11-11 RX ORDER — SODIUM CHLORIDE 0.9 % (FLUSH) 0.9 %
10 SYRINGE (ML) INJECTION EVERY 12 HOURS SCHEDULED
Status: DISCONTINUED | OUTPATIENT
Start: 2022-11-11 | End: 2022-11-18 | Stop reason: HOSPADM

## 2022-11-11 RX ORDER — OXYMETAZOLINE HYDROCHLORIDE 0.05 G/100ML
2 SPRAY NASAL ONCE
Status: ACTIVE | OUTPATIENT
Start: 2022-11-11 | End: 2022-11-14

## 2022-11-11 RX ORDER — SODIUM CHLORIDE, SODIUM LACTATE, POTASSIUM CHLORIDE, CALCIUM CHLORIDE 600; 310; 30; 20 MG/100ML; MG/100ML; MG/100ML; MG/100ML
INJECTION, SOLUTION INTRAVENOUS CONTINUOUS
Status: DISCONTINUED | OUTPATIENT
Start: 2022-11-11 | End: 2022-11-17

## 2022-11-11 RX ORDER — MORPHINE SULFATE 4 MG/ML
4 INJECTION, SOLUTION INTRAMUSCULAR; INTRAVENOUS
Status: DISCONTINUED | OUTPATIENT
Start: 2022-11-11 | End: 2022-11-17

## 2022-11-11 RX ORDER — ENOXAPARIN SODIUM 100 MG/ML
40 INJECTION SUBCUTANEOUS DAILY
Status: DISCONTINUED | OUTPATIENT
Start: 2022-11-11 | End: 2022-11-14

## 2022-11-11 RX ADMIN — MORPHINE SULFATE 4 MG: 4 INJECTION, SOLUTION INTRAMUSCULAR; INTRAVENOUS at 16:48

## 2022-11-11 RX ADMIN — ENOXAPARIN SODIUM 40 MG: 100 INJECTION SUBCUTANEOUS at 17:41

## 2022-11-11 RX ADMIN — SODIUM CHLORIDE, POTASSIUM CHLORIDE, SODIUM LACTATE AND CALCIUM CHLORIDE: 600; 310; 30; 20 INJECTION, SOLUTION INTRAVENOUS at 17:42

## 2022-11-11 RX ADMIN — IOPAMIDOL 75 ML: 755 INJECTION, SOLUTION INTRAVENOUS at 12:59

## 2022-11-11 RX ADMIN — SODIUM CHLORIDE, PRESERVATIVE FREE 40 MG: 5 INJECTION INTRAVENOUS at 17:41

## 2022-11-11 ASSESSMENT — PAIN SCALES - GENERAL
PAINLEVEL_OUTOF10: 6
PAINLEVEL_OUTOF10: 6

## 2022-11-11 ASSESSMENT — ENCOUNTER SYMPTOMS
ABDOMINAL PAIN: 1
VOMITING: 1
ABDOMINAL DISTENTION: 0
DIARRHEA: 0
BACK PAIN: 0
CHEST TIGHTNESS: 0
COUGH: 0
NAUSEA: 1
PHOTOPHOBIA: 0
SHORTNESS OF BREATH: 0

## 2022-11-11 ASSESSMENT — PAIN DESCRIPTION - FREQUENCY
FREQUENCY: CONTINUOUS
FREQUENCY: CONTINUOUS

## 2022-11-11 ASSESSMENT — PAIN DESCRIPTION - PAIN TYPE
TYPE: ACUTE PAIN
TYPE: ACUTE PAIN

## 2022-11-11 ASSESSMENT — PAIN - FUNCTIONAL ASSESSMENT
PAIN_FUNCTIONAL_ASSESSMENT: 0-10
PAIN_FUNCTIONAL_ASSESSMENT: PREVENTS OR INTERFERES WITH MANY ACTIVE NOT PASSIVE ACTIVITIES

## 2022-11-11 ASSESSMENT — PAIN DESCRIPTION - ORIENTATION
ORIENTATION: RIGHT;MID
ORIENTATION: RIGHT

## 2022-11-11 ASSESSMENT — PAIN DESCRIPTION - LOCATION
LOCATION: ABDOMEN
LOCATION: ABDOMEN

## 2022-11-11 ASSESSMENT — LIFESTYLE VARIABLES: HOW MANY STANDARD DRINKS CONTAINING ALCOHOL DO YOU HAVE ON A TYPICAL DAY: PATIENT DOES NOT DRINK

## 2022-11-11 NOTE — H&P
GENERAL SURGERY  HISTORY AND PHYSICAL  11/11/2022    Chief Complaint   Patient presents with    Abdominal Pain     RT MID FLANK STARTED NAUSEA WITH DRY HEAVES/ DENIES DIARRHEA       HPI  Hossein Stokes is a 67 y.o. male who presents for evaluation of abdominal pain that started at 7 PM last night. Patient states that he was having bowel movements yesterday up until the pain began. Patient has a significant and extensive past medical/surgical history. Most of patient's surgeries were performed at [facility], these included sigmoidectomy and diagnostic laparoscopy with lysis of adhesions. Most recently patient has been evaluated by [facility] secondary to esophageal stricture. Patient's last colonoscopy and EGD were March of this year. Currently patient states that he just feels distended and bloated with associated nausea. Denies any current vomiting. Does state that he is in quite a bit of pain. CT imaging was reviewed consistent with partial obstruction. 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 D'Amico Pathology invasive moderately differentiated  adenocarcinoma arising in association with tubulovillous adenoma 5 lymph nodes removed and negative for metastatic carcinoma    Pulmonary hypertension ([address] 1/10/2020    Echo Dr Eyad Orosco 12/30/19 SR, Left ventricular size is normal, mild concentric LVH, EF 55 - 03% Diastolic filling pattern indicates impaired relaxation, left atrium mildly dilated, mild AV sclerosis without stenosis, mild mitral regurgitation, mild tricuspid regurgitation, mild to moderate pulmonary hypertension, no pericardial effusion. Sleep apnea     Tick bite of abdomen 4/8/2019    Valvular heart disease 1/10/2020    Echo Dr Raji Olsen 12/30/19 SR, Left ventricular size is normal, mild concentric LVH, EF 55 - 99% Diastolic filling pattern indicates impaired relaxation, left atrium mildly dilated, mild AV sclerosis without stenosis, mild mitral regurgitation, mild tricuspid regurgitation, mild to moderate pulmonary hypertension, no pericardial effusion. See scanned echo       Past Surgical History:   Procedure Laterality Date    CARDIAC CATHETERIZATION      EYE SURGERY Left 03/2022    HEMORRHOID SURGERY      SPLENECTOMY      TONSILLECTOMY AND ADENOIDECTOMY         Prior to Admission medications    Medication Sig Start Date End Date Taking?  Authorizing Provider   Hyprom-Naphaz-Polysorb-Zn Sulf (CLEAR EYES COMPLETE OP) Apply to eye    Historical Provider, MD   albuterol sulfate HFA (PROAIR HFA) 108 (90 Base) MCG/ACT inhaler Inhale 2 puffs into the lungs every 6 hours as needed for Wheezing 11/7/22   CAITLIN Case CNP   ramipril (ALTACE) 2.5 MG capsule Take 1 capsule by mouth daily 11/7/22   CAITLIN Case CNP   omeprazole (PRILOSEC) 40 MG delayed release capsule Take 1 capsule by mouth daily 11/7/22   CAITLIN Case CNP   zoster recombinant adjuvanted vaccine [facility]) 50 MCG/0.5ML SUSR injection Inject 0.5 mLs into the muscle See Admin Instructions 1 dose now and repeat in 2-6 months 11/7/22 5/6/23  CAITLIN Case CNP   baclofen (LIORESAL) 10 MG tablet Take 1 tablet by mouth nightly as needed (muscle spasms) 6/30/22   CAITLIN Case CNP   Omega-3 Fatty Acids (FISH OIL) 1000 MG CAPS Take 3,000 mg by mouth 3 times daily    Historical Provider, MD   Multiple Vitamins-Minerals (ICAPS AREDS FORMULA PO) Take by mouth    Historical Provider, MD   Gluc-Chonn-MSM-Boswellia-Vit D (GLUCOSAMINE CHONDROITIN + D3 PO) Take 1 tablet by mouth daily    Historical Provider, MD Batool LYN 62.5-25 MCG/INH AEPB inhaler Inhale 1 puff into the lungs daily One puff inhalation daily 21   Mariama Austin, APRN - CNP       Allergies   Allergen Reactions    Penicillins Hives and Swelling       Family History   Problem Relation Age of Onset    Cancer Mother         Lymphoma    Cancer Father         Prostate    Cancer Sister         Lymphoma       Social History     Tobacco Use    Smoking status: Some Days     Packs/day: 2.00     Years: 30.00     Pack years: 60.00     Types: Cigarettes     Start date: 1/10/1962     Last attempt to quit: 2018     Years since quittin.8    Smokeless tobacco: Never   Substance Use Topics    Alcohol use: Yes     Comment: Social    Drug use: No         Review of Systems - History obtained from the patient  General ROS: negative  Psychological ROS: negative  Ophthalmic ROS: negative  ENT ROS: negative  Allergy and Immunology ROS: negative  Hematological and Lymphatic ROS: negative  Endocrine ROS: negative  Respiratory ROS: no cough, shortness of breath, or wheezing  Cardiovascular ROS: negative  Gastrointestinal ROS: multiple surgeries, Nausea   Genito-Urinary ROS: negative  Musculoskeletal ROS: negative  Neurological ROS: negative  Dermatological ROS: negative      PHYSICAL EXAM:    Vitals:    22 1530   BP: 121/69   Pulse: 65   Resp: 18   Temp:        General Appearance:  awake, alert, oriented, in no acute distress  Skin:  Skin color, texture, turgor normal. No rashes or lesions. Head/face:  NCAT  Eyes:  PERRL  Lungs:  No chest wall tenderness.   Heart:  Heart regular rate and rhythm  Abdomen: Soft, mild but diffusely tender, mildly distended  Extremities: pulses present in all extremities  Neurologic:  negative    LABS:  CBC  Recent Labs     22  1135   WBC 14.2*   HGB 14.7   HCT 44.5        BMP  Recent Labs     11/11/22  1135      K 4.7      CO2 25   BUN 17   CREATININE 0.9   CALCIUM 9.7     Liver Function  Recent Labs     11/11/22  1135   LIPASE 24   BILITOT 0.3   AST 15   ALT 15   ALKPHOS 86   PROT 7.0   LABALBU 4.3     No results for input(s): LACTATE in the last 72 hours. No results for input(s): INR, PTT in the last 72 hours. Invalid input(s): PT    RADIOLOGY    CT ABDOMEN PELVIS W IV CONTRAST Additional Contrast? None    Result Date: 11/11/2022  EXAMINATION: CT OF THE ABDOMEN AND PELVIS WITH CONTRAST 11/11/2022 12:57 pm TECHNIQUE: CT of the abdomen and pelvis was performed with the administration of intravenous contrast. Multiplanar reformatted images are provided for review. Automated exposure control, iterative reconstruction, and/or weight based adjustment of the mA/kV was utilized to reduce the radiation dose to as low as reasonably achievable. COMPARISON: None. HISTORY: ORDERING SYSTEM PROVIDED HISTORY: RLQ and suprapubic pain, appy? obstruction? hx of  colon resection TECHNOLOGIST PROVIDED HISTORY: Reason for exam:->RLQ and suprapubic pain, appy? obstruction? hx of  colon resection Additional Contrast?->None Decision Support Exception - unselect if not a suspected or confirmed emergency medical condition->Emergency Medical Condition (MA) FINDINGS: Lower Chest:   The lung bases are grossly clear. Organs: The liver reveals several cyst in the periphery of the right lobe of the liver. Fold identified at the fundus of the gallbladder. Pancreas is homogeneous. Spleen reveals a small cyst.  The right adrenal glands unremarkable with questionable nodularity on the left adrenal gland. Symmetric enhancement of the renal parenchyma. No stones or distension seen in the renal collecting system. Small cyst identified in the kidneys bilaterally. GI/Bowel: The stomach is unremarkable. No wall thickening.   The small bowel reveals some distension identified of the mid small bowel. Several of the proximal small bowel loops are unremarkable. The distal small bowel loops are unremarkable. There is dilatation and air filling with fluid several of the mid small bowel loops with scattered air-fluid levels identified. There is minimal fluid identified in the abdomen and pelvis. Findings suggesting a partial obstruction. The transition point is seen near the right lower quadrant. The appendix is not well visualized on this examination. No significant stranding identified in the right lower quadrant. Fecalization identified of the terminal ileum. Surgical staple line identified near the rectosigmoid junction. Pelvis: The bladder is mildly distended. No gross mass or lesion. Prostate is unremarkable with mild enlargement. There is dystrophic calcification. No pelvic adenopathy. Peritoneum/Retroperitoneum: There are abnormally enlarged retroperitoneal lymph nodes. There is for example 1 cm left periaortic lymph node and adenopathy as well in the aortocaval region. There is a 1.1 cm aortocaval lymph node present. There are small lymph nodes seen along the pelvic sidewalls for example 9 mm lymph nodes seen in the iliac regions bilaterally. Bones/Soft Tissues: The bony structures reveal degenerative changes seen within the spine and sacroiliac joints bilaterally. Fluid-filled dilated and air-filled loops of mid small bowel with decompression both distally and normal appearance proximally. There is fecalization of the terminal ileum. Findings suggest partial obstruction. Transition point is seen in the lower abdomen. The appendix is not well visualized however no inflammatory changes in the right lower quadrant. Stool scattered throughout the colon. Unexplained mildly enlarged lymph nodes seen throughout the retroperitoneum and pelvic sidewalls. Continued follow-up is recommended for stability.          ASSESSMENT:  67 y.o. male with partial small bowel obstruction     PLAN:  NPO, IVF  NG tube placement-- needs to be confirmed by CXR  Bowel rest   Partial small bowel obstruction-- given significant past abdominal surgeries will attempt to treat non-operatively  Await return of bowel function  Possible CT with PO tomorrow     Electronically signed by Miley Healy DO on 11/11/22 at 5:11 PM EST     General Surgery Progress Note  [facility]    Patient's Name/Date of Birth: Arabella Fine / 1950    Date: November 12, 2022     Surgeon: Zenaida Alexander MD    Chief Complaint: Abdominal pain    Patient Active Problem List   Diagnosis    Essential hypertension    Bradycardia    Nocturnal hypoxemia due to emphysema (HCC)    Obstructive sleep apnea treated with continuous positive airway pressure (CPAP)    Centrilobular emphysema (HCC)    Gastroesophageal reflux disease with esophagitis    Vitreous degeneration    Presbyopia    After-cataract    Monocytosis    Esophageal stenosis    Chronic bilateral low back pain with left-sided sciatica    Right anterior knee pain    Valvular heart disease    Pulmonary hypertension ([address]    Enlarged aorta ([address]    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 Believes this past a little bit of flatus. No bowel movements. No nausea or burping since NG tube was placed.     Objective:  /69   Pulse 65   Temp 98.5 °F (36.9 °C) (Oral)   Resp 18   Ht 6' (1.829 m)   Wt 172 lb (78 kg)   SpO2 94%   BMI 23.33 kg/m²   Labs:  Recent Labs     11/11/22  1135 11/12/22  0341   WBC 14.2* 12.8*   HGB 14.7 14.6   HCT 44.5 43.1     Lab Results Component Value Date    CREATININE 0.8 11/12/2022    BUN 17 11/12/2022     11/12/2022    K 4.2 11/12/2022    CL 99 11/12/2022    CO2 26 11/12/2022     Recent Labs     11/11/22  1135   LIPASE 24     CBC with Differential:    Lab Results   Component Value Date/Time    WBC 12.8 11/12/2022 03:41 AM    RBC 4.78 11/12/2022 03:41 AM    HGB 14.6 11/12/2022 03:41 AM    HCT 43.1 11/12/2022 03:41 AM     11/12/2022 03:41 AM    MCV 90.2 11/12/2022 03:41 AM    MCH 30.5 11/12/2022 03:41 AM    MCHC 33.9 11/12/2022 03:41 AM    RDW 14.1 11/12/2022 03:41 AM    LYMPHOPCT 14.5 11/12/2022 03:41 AM    MONOPCT 10.5 11/12/2022 03:41 AM    BASOPCT 0.2 11/12/2022 03:41 AM    MONOSABS 1.34 11/12/2022 03:41 AM    LYMPHSABS 1.86 11/12/2022 03:41 AM    EOSABS 0.01 11/12/2022 03:41 AM    BASOSABS 0.03 11/12/2022 03:41 AM     CMP:    Lab Results   Component Value Date/Time     11/12/2022 03:41 AM    K 4.2 11/12/2022 03:41 AM    CL 99 11/12/2022 03:41 AM    CO2 26 11/12/2022 03:41 AM    BUN 17 11/12/2022 03:41 AM    CREATININE 0.8 11/12/2022 03:41 AM    GFRAA >60 03/30/2022 09:22 AM    LABGLOM >60 11/12/2022 03:41 AM    GLUCOSE 108 11/12/2022 03:41 AM    PROT 5.9 11/12/2022 03:41 AM    LABALBU 3.7 11/12/2022 03:41 AM    CALCIUM 9.1 11/12/2022 03:41 AM    BILITOT 0.4 11/12/2022 03:41 AM    ALKPHOS 80 11/12/2022 03:41 AM    AST 13 11/12/2022 03:41 AM    ALT 12 11/12/2022 03:41 AM       General appearance:  NAD  Head: NCAT, PERRLA, EOMI, red conjunctiva  Neck: supple, no masses  Lungs: CTAB, equal chest rise bilateral  Heart: Reg rate  Abdomen: soft, minimally distended, tender mild right lower quadrant without guarding or rebound.   Skin; no lesions  Gu: no cva tenderness  Extremities: extremities normal, atraumatic, no cyanosis or edema      Assessment/Plan:  Phuong Phillips is a 67 y.o. male small bowel obstruction likely secondary to adhesions    Continue n.p.o. with NG tube  Continue to hydrate  Repeat CT abdomen and pelvis with enteric contrast today  Out of bed and ambulate  Consult internal medicine for medical management  If bowel function returns, will hold off on surgical intervention pending CT scan findings    Chrissy Esparza MD  11/12/2022  9:52 AM

## 2022-11-11 NOTE — ED PROVIDER NOTES
Lanette Harada is a 67year old male with PMH of HTN, GERD, colon cancer who presents emergency department with concern for pain. Pain is had suprapubic and right lower quadrant abdominal pain that started yesterday and has worsened. Patient is having a stabbing pain with nausea and vomiting. Patient denies any hematemesis. Patient did have a bowel resection about 10 years ago that was then complicated by bowel obstruction. Patient has a history of colon cancer treated with surgery only and has been disease-free. Patient denies fever, chills, chest pain, shortness of breath nothing make symptoms better or worse symptoms are moderate in severity and constant. Patient has hx of bowel resection at [facility] 2018    The history is provided by the patient and medical records. Review of Systems   Constitutional:  Negative for chills, diaphoresis, fatigue and fever. Eyes:  Negative for photophobia and visual disturbance. Respiratory:  Negative for cough, chest tightness and shortness of breath. Cardiovascular:  Negative for chest pain, palpitations and leg swelling. Gastrointestinal:  Positive for abdominal pain, nausea and vomiting. Negative for abdominal distention and diarrhea. Genitourinary:  Negative for dysuria. Musculoskeletal:  Negative for back pain, neck pain and neck stiffness. Skin:  Negative for pallor and rash. Neurological:  Negative for headaches. Psychiatric/Behavioral:  Negative for confusion. Physical Exam  Vitals and nursing note reviewed. Constitutional:       General: He is not in acute distress. Appearance: He is ill-appearing. HENT:      Head: Normocephalic and atraumatic. Eyes:      General: No scleral icterus. Conjunctiva/sclera: Conjunctivae normal.      Pupils: Pupils are equal, round, and reactive to light. Cardiovascular:      Rate and Rhythm: Normal rate and regular rhythm.    Pulmonary:      Effort: Pulmonary effort is normal.      Breath sounds: Normal breath sounds. Abdominal:      General: Bowel sounds are normal. There is no distension. Palpations: Abdomen is soft. Tenderness: There is abdominal tenderness. There is no guarding or rebound. Musculoskeletal:      Cervical back: Normal range of motion and neck supple. No rigidity. No muscular tenderness. Right lower leg: No edema. Left lower leg: No edema. Skin:     General: Skin is warm and dry. Capillary Refill: Capillary refill takes less than 2 seconds. Coloration: Skin is not pale. Findings: No erythema or rash. Neurological:      Mental Status: He is alert and oriented to person, place, and time. Psychiatric:         Mood and Affect: Mood normal.        Procedures     MDM  Number of Diagnoses or Management Options  Intestinal obstruction, unspecified cause, unspecified whether partial or complete [facility])  Diagnosis management comments: Brenda Garcia is a 67year old male who presented to ED with concern for abdominal pain   Patient was found to  have partial bowel obstruction  Surgery consulted they evaluated patient and placed admission order  NG was placed patient stable and improved following IV Pain medication   Patient agrees to admission               ED Course as of 11/12/22 0718   Fri Nov 11, 2022   1635 Spoke with Dr. Bailey King he will follow patient for admission  [SS]   1721 EKG: This EKG is signed and interpreted by me.     Rate: 63  Rhythm: Sinus  Interpretation: non-specific EKG, no St elevation, anterior/infer  infarct  Comparison: no previous EKG   [SS]      ED Course User Index  [SS] Isaac Barnes MD       --------------------------------------------- PAST HISTORY ---------------------------------------------  Past Medical History:  has a past medical history of CAD (coronary artery disease), Colon adenocarcinoma ([facility] [address] COPD [facility]ch[facility] obstructive [facility] disease) [facility] Elevated triglycerides with high cholesterol, Emphysema of lung ([address] GERD (gastroesophageal reflux disease), Hx of splenectomy, Hypertension, Macular degeneration, bilateral, Multiple lung nodules on CT, Neuropathy, Osteoarthritis, Polyp of colon, Pulmonary hypertension ([address] Sleep apnea, Tick bite of abdomen, and Valvular heart disease. Past Surgical History:  has a past surgical history that includes Splenectomy; Hemorrhoid surgery; Tonsillectomy and adenoidectomy; Cardiac catheterization; and eye surgery (Left, 03/2022). Social History:  reports that he has been smoking cigarettes. He started smoking about 60 years ago. He has a 15.00 pack-year smoking history. He has never used smokeless tobacco. He reports current alcohol use. He reports that he does not use drugs. Family History: family history includes Cancer in his father, mother, and sister. The patients home medications have been reviewed.     Allergies: Penicillins    -------------------------------------------------- RESULTS -------------------------------------------------    LABS:  Results for orders placed or performed during the hospital encounter of 11/11/22   CBC with Auto Differential   Result Value Ref Range    WBC 14.2 (H) 4.5 - 11.5 E9/L    RBC 4.81 3.80 - 5.80 E12/L    Hemoglobin 14.7 12.5 - 16.5 g/dL    Hematocrit 44.5 37.0 - 54.0 %    MCV 92.5 80.0 - 99.9 fL    MCH 30.6 26.0 - 35.0 pg    MCHC 33.0 32.0 - 34.5 %    RDW 14.1 11.5 - 15.0 fL    Platelets 937 020 - 675 E9/L    MPV 9.3 7.0 - 12.0 fL    Neutrophils % 82.0 (H) 43.0 - 80.0 %    Lymphocytes % 12.0 (L) 20.0 - 42.0 %    Monocytes % 6.0 2.0 - 12.0 %    Eosinophils % 0.0 0.0 - 6.0 %    Basophils % 0.0 0.0 - 2.0 %    Neutrophils Absolute 11.64 (H) 1.80 - 7.30 E9/L    Lymphocytes Absolute 1.70 1.50 - 4.00 E9/L    Monocytes Absolute 0.85 0.10 - 0.95 E9/L    Eosinophils Absolute 0.00 (L) 0.05 - 0.50 E9/L    Basophils Absolute 0.00 0.00 - 0.20 E9/L    Poikilocytes 1+     Em Cells 1+    Comprehensive Metabolic Panel w/ Reflex to MG Result Value Ref Range    Sodium 137 132 - 146 mmol/L    Potassium reflex Magnesium 4.7 3.5 - 5.0 mmol/L    Chloride 100 98 - 107 mmol/L    CO2 25 22 - 29 mmol/L    Anion Gap 12 7 - 16 mmol/L    Glucose 110 (H) 74 - 99 mg/dL    BUN 17 6 - 23 mg/dL    Creatinine 0.9 0.7 - 1.2 mg/dL    Est, Glom Filt Rate >60 >=60 mL/min/1.73    Calcium 9.7 8.6 - 10.2 mg/dL    Total Protein 7.0 6.4 - 8.3 g/dL    Albumin 4.3 3.5 - 5.2 g/dL    Total Bilirubin 0.3 0.0 - 1.2 mg/dL    Alkaline Phosphatase 86 40 - 129 U/L    ALT 15 0 - 40 U/L    AST 15 0 - 39 U/L   Troponin   Result Value Ref Range    Troponin, High Sensitivity 21 (H) 0 - 11 ng/L   Lipase   Result Value Ref Range    Lipase 24 13 - 60 U/L   Lactic Acid   Result Value Ref Range    Lactic Acid 1.0 0.5 - 2.2 mmol/L   Urinalysis with Microscopic   Result Value Ref Range    Color, UA Yellow Straw/Yellow    Clarity, UA Clear Clear    Glucose, Ur Negative Negative mg/dL    Bilirubin Urine Negative Negative    Ketones, Urine Negative Negative mg/dL    Specific Gravity, UA >=1.030 1.005 - 1.030    Blood, Urine Negative Negative    pH, UA 6.0 5.0 - 9.0    Protein, UA TRACE Negative mg/dL    Urobilinogen, Urine 0.2 <2.0 E.U./dL    Nitrite, Urine Negative Negative    Leukocyte Esterase, Urine Negative Negative    WBC, UA 1-3 0 - 5 /HPF    RBC, UA NONE 0 - 2 /HPF    Epithelial Cells, UA RARE /HPF    Bacteria, UA RARE (A) None Seen /HPF    Amorphous, UA FEW    Troponin   Result Value Ref Range    Troponin, High Sensitivity 21 (H) 0 - 11 ng/L   CBC auto differential   Result Value Ref Range    WBC 12.8 (H) 4.5 - 11.5 E9/L    RBC 4.78 3.80 - 5.80 E12/L    Hemoglobin 14.6 12.5 - 16.5 g/dL    Hematocrit 43.1 37.0 - 54.0 %    MCV 90.2 80.0 - 99.9 fL    MCH 30.5 26.0 - 35.0 pg    MCHC 33.9 32.0 - 34.5 %    RDW 14.1 11.5 - 15.0 fL    Platelets 613 568 - 015 E9/L    MPV 9.4 7.0 - 12.0 fL    Neutrophils % 74.2 43.0 - 80.0 %    Immature Granulocytes % 0.5 0.0 - 5.0 %    Lymphocytes % 14.5 (L) 20.0 - 42.0 %    Monocytes % 10.5 2.0 - 12.0 %    Eosinophils % 0.1 0.0 - 6.0 %    Basophils % 0.2 0.0 - 2.0 %    Neutrophils Absolute 9.49 (H) 1.80 - 7.30 E9/L    Immature Granulocytes # 0.06 E9/L    Lymphocytes Absolute 1.86 1.50 - 4.00 E9/L    Monocytes Absolute 1.34 (H) 0.10 - 0.95 E9/L    Eosinophils Absolute 0.01 (L) 0.05 - 0.50 E9/L    Basophils Absolute 0.03 0.00 - 0.20 E9/L   Comprehensive Metabolic Panel w/ Reflex to MG   Result Value Ref Range    Sodium 135 132 - 146 mmol/L    Potassium reflex Magnesium 4.2 3.5 - 5.0 mmol/L    Chloride 99 98 - 107 mmol/L    CO2 26 22 - 29 mmol/L    Anion Gap 10 7 - 16 mmol/L    Glucose 108 (H) 74 - 99 mg/dL    BUN 17 6 - 23 mg/dL    Creatinine 0.8 0.7 - 1.2 mg/dL    Est, Glom Filt Rate >60 >=60 mL/min/1.73    Calcium 9.1 8.6 - 10.2 mg/dL    Total Protein 5.9 (L) 6.4 - 8.3 g/dL    Albumin 3.7 3.5 - 5.2 g/dL    Total Bilirubin 0.4 0.0 - 1.2 mg/dL    Alkaline Phosphatase 80 40 - 129 U/L    ALT 12 0 - 40 U/L    AST 13 0 - 39 U/L   EKG 12 Lead   Result Value Ref Range    Ventricular Rate 63 BPM    Atrial Rate 63 BPM    P-R Interval 180 ms    QRS Duration 112 ms    Q-T Interval 406 ms    QTc Calculation (Bazett) 415 ms    P Axis 85 degrees    R Axis -166 degrees    T Axis 59 degrees       RADIOLOGY:  XR ABDOMEN FOR NG/OG/NE TUBE PLACEMENT   Final Result   1. Catheter is in the proximal stomach as described, side hole around the   diaphragm level/esophageal hiatus   2. Bowel dilation         CT ABDOMEN PELVIS W IV CONTRAST Additional Contrast? None   Final Result   Fluid-filled dilated and air-filled loops of mid small bowel with   decompression both distally and normal appearance proximally. There is   fecalization of the terminal ileum. Findings suggest partial obstruction. Transition point is seen in the lower abdomen. The appendix is not well   visualized however no inflammatory changes in the right lower quadrant. Stool scattered throughout the colon. Unexplained mildly enlarged lymph nodes seen throughout the retroperitoneum   and pelvic sidewalls. Continued follow-up is recommended for stability.                   ------------------------- NURSING NOTES AND VITALS REVIEWED ---------------------------  Date / Time Roomed:  11/11/2022 11:00 AM  ED Bed Assignment:  [id number]/0323-01    The nursing notes within the ED encounter and vital signs as below have been reviewed. Patient Vitals for the past 24 hrs:   BP Temp Temp src Pulse Resp SpO2 Height Weight   11/11/22 2140 132/69 98.5 °F (36.9 °C) Oral 65 18 94 % 6' (1.829 m) 172 lb (78 kg)   11/11/22 2040 132/69 98.5 °F (36.9 °C) Oral 65 18 94 % -- --   11/11/22 1930 -- -- -- -- -- 95 % -- --   11/11/22 1928 131/72 98 °F (36.7 °C) -- 82 18 96 % -- --   11/11/22 1757 128/62 -- -- 66 18 -- -- --   11/11/22 1530 121/69 -- -- 65 18 -- -- --   11/11/22 1120 -- -- -- -- 18 -- -- --   11/11/22 1101 (!) 140/69 -- -- 55 -- -- -- 172 lb (78 kg)   11/11/22 1012 -- 98.2 °F (36.8 °C) -- -- -- -- -- --       Oxygen Saturation Interpretation: Normal    ------------------------------------------ PROGRESS NOTES ------------------------------------------  Re-evaluation(s):  Time: 2pm  Patients symptoms show no change  Repeat physical examination is not changed    Counseling:  I have spoken with the patient and discussed todays results, in addition to providing specific details for the plan of care and counseling regarding the diagnosis and prognosis. Their questions are answered at this time and they are agreeable with the plan of admission.    --------------------------------- ADDITIONAL PROVIDER NOTES ---------------------------------  Consultations:  T Spoke with surgery. Discussed case. They will admit the patient.   This patient's ED course included: a personal history and physicial examination, re-evaluation prior to disposition, multiple bedside re-evaluations, and IV medications    This patient has remained hemodynamically stable during their ED course. Diagnosis:  1. Intestinal obstruction, unspecified cause, unspecified whether partial or complete ([facility] [address]        Disposition:  Patient's disposition: Admit to med/surg floor  Patient's condition is stable.          Abhinav Menjivar MD  11/12/22 1944

## 2022-11-12 ENCOUNTER — APPOINTMENT (OUTPATIENT)
Dept: CT IMAGING | Age: 72
DRG: 329 | End: 2022-11-12
Payer: MEDICARE

## 2022-11-12 ENCOUNTER — ANESTHESIA EVENT (OUTPATIENT)
Dept: OPERATING ROOM | Age: 72
DRG: 329 | End: 2022-11-12
Payer: MEDICARE

## 2022-11-12 LAB
ALBUMIN SERPL-MCNC: 3.7 G/DL (ref 3.5–5.2)
ALP BLD-CCNC: 80 U/L (ref 40–129)
ALT SERPL-CCNC: 12 U/L (ref 0–40)
ANION GAP SERPL CALCULATED.3IONS-SCNC: 10 MMOL/L (ref 7–16)
AST SERPL-CCNC: 13 U/L (ref 0–39)
BASOPHILS ABSOLUTE: 0.03 E9/L (ref 0–0.2)
BASOPHILS RELATIVE PERCENT: 0.2 % (ref 0–2)
BILIRUB SERPL-MCNC: 0.4 MG/DL (ref 0–1.2)
BUN BLDV-MCNC: 17 MG/DL (ref 6–23)
CALCIUM SERPL-MCNC: 9.1 MG/DL (ref 8.6–10.2)
CHLORIDE BLD-SCNC: 99 MMOL/L (ref 98–107)
CO2: 26 MMOL/L (ref 22–29)
CREAT SERPL-MCNC: 0.8 MG/DL (ref 0.7–1.2)
EOSINOPHILS ABSOLUTE: 0.01 E9/L (ref 0.05–0.5)
EOSINOPHILS RELATIVE PERCENT: 0.1 % (ref 0–6)
GFR SERPL CREATININE-BSD FRML MDRD: >60 ML/MIN/1.73
GLUCOSE BLD-MCNC: 108 MG/DL (ref 74–99)
HCT VFR BLD CALC: 43.1 % (ref 37–54)
HEMOGLOBIN: 14.6 G/DL (ref 12.5–16.5)
IMMATURE GRANULOCYTES #: 0.06 E9/L
IMMATURE GRANULOCYTES %: 0.5 % (ref 0–5)
LYMPHOCYTES ABSOLUTE: 1.86 E9/L (ref 1.5–4)
LYMPHOCYTES RELATIVE PERCENT: 14.5 % (ref 20–42)
MCH RBC QN AUTO: 30.5 PG (ref 26–35)
MCHC RBC AUTO-ENTMCNC: 33.9 % (ref 32–34.5)
MCV RBC AUTO: 90.2 FL (ref 80–99.9)
MONOCYTES ABSOLUTE: 1.34 E9/L (ref 0.1–0.95)
MONOCYTES RELATIVE PERCENT: 10.5 % (ref 2–12)
NEUTROPHILS ABSOLUTE: 9.49 E9/L (ref 1.8–7.3)
NEUTROPHILS RELATIVE PERCENT: 74.2 % (ref 43–80)
PDW BLD-RTO: 14.1 FL (ref 11.5–15)
PLATELET # BLD: 327 E9/L (ref 130–450)
PMV BLD AUTO: 9.4 FL (ref 7–12)
POTASSIUM REFLEX MAGNESIUM: 4.2 MMOL/L (ref 3.5–5)
RBC # BLD: 4.78 E12/L (ref 3.8–5.8)
SODIUM BLD-SCNC: 135 MMOL/L (ref 132–146)
TOTAL PROTEIN: 5.9 G/DL (ref 6.4–8.3)
WBC # BLD: 12.8 E9/L (ref 4.5–11.5)

## 2022-11-12 PROCEDURE — 99223 1ST HOSP IP/OBS HIGH 75: CPT | Performed by: INTERNAL MEDICINE

## 2022-11-12 PROCEDURE — 36415 COLL VENOUS BLD VENIPUNCTURE: CPT

## 2022-11-12 PROCEDURE — 94640 AIRWAY INHALATION TREATMENT: CPT

## 2022-11-12 PROCEDURE — 6360000002 HC RX W HCPCS: Performed by: INTERNAL MEDICINE

## 2022-11-12 PROCEDURE — 6360000002 HC RX W HCPCS: Performed by: STUDENT IN AN ORGANIZED HEALTH CARE EDUCATION/TRAINING PROGRAM

## 2022-11-12 PROCEDURE — 74176 CT ABD & PELVIS W/O CONTRAST: CPT

## 2022-11-12 PROCEDURE — A4216 STERILE WATER/SALINE, 10 ML: HCPCS | Performed by: STUDENT IN AN ORGANIZED HEALTH CARE EDUCATION/TRAINING PROGRAM

## 2022-11-12 PROCEDURE — 6360000004 HC RX CONTRAST MEDICATION: Performed by: RADIOLOGY

## 2022-11-12 PROCEDURE — 1200000000 HC SEMI PRIVATE

## 2022-11-12 PROCEDURE — 85025 COMPLETE CBC W/AUTO DIFF WBC: CPT

## 2022-11-12 PROCEDURE — 80053 COMPREHEN METABOLIC PANEL: CPT

## 2022-11-12 PROCEDURE — C9113 INJ PANTOPRAZOLE SODIUM, VIA: HCPCS | Performed by: STUDENT IN AN ORGANIZED HEALTH CARE EDUCATION/TRAINING PROGRAM

## 2022-11-12 PROCEDURE — 2580000003 HC RX 258: Performed by: STUDENT IN AN ORGANIZED HEALTH CARE EDUCATION/TRAINING PROGRAM

## 2022-11-12 RX ORDER — ARFORMOTEROL TARTRATE 15 UG/2ML
15 SOLUTION RESPIRATORY (INHALATION) 2 TIMES DAILY
Status: DISCONTINUED | OUTPATIENT
Start: 2022-11-12 | End: 2022-11-18 | Stop reason: HOSPADM

## 2022-11-12 RX ADMIN — ENOXAPARIN SODIUM 40 MG: 100 INJECTION SUBCUTANEOUS at 09:48

## 2022-11-12 RX ADMIN — MORPHINE SULFATE 2 MG: 2 INJECTION, SOLUTION INTRAMUSCULAR; INTRAVENOUS at 09:55

## 2022-11-12 RX ADMIN — Medication 10 ML: at 11:17

## 2022-11-12 RX ADMIN — MORPHINE SULFATE 2 MG: 2 INJECTION, SOLUTION INTRAMUSCULAR; INTRAVENOUS at 19:58

## 2022-11-12 RX ADMIN — IOPAMIDOL 50 ML: 612 INJECTION, SOLUTION INTRAVENOUS at 12:24

## 2022-11-12 RX ADMIN — IPRATROPIUM BROMIDE 0.5 MG: 0.5 SOLUTION RESPIRATORY (INHALATION) at 18:16

## 2022-11-12 RX ADMIN — SODIUM CHLORIDE, PRESERVATIVE FREE 40 MG: 5 INJECTION INTRAVENOUS at 09:48

## 2022-11-12 RX ADMIN — ARFORMOTEROL TARTRATE 15 MCG: 15 SOLUTION RESPIRATORY (INHALATION) at 18:16

## 2022-11-12 ASSESSMENT — PAIN DESCRIPTION - ORIENTATION: ORIENTATION: RIGHT

## 2022-11-12 ASSESSMENT — PAIN SCALES - GENERAL
PAINLEVEL_OUTOF10: 5
PAINLEVEL_OUTOF10: 5

## 2022-11-12 ASSESSMENT — PAIN DESCRIPTION - LOCATION
LOCATION: ABDOMEN
LOCATION: ABDOMEN

## 2022-11-12 ASSESSMENT — PAIN DESCRIPTION - DESCRIPTORS
DESCRIPTORS: ACHING
DESCRIPTORS: CRAMPING

## 2022-11-12 NOTE — ED NOTES
Report to [facility] at this time.       Edith Martinez, ABILIO  11/11/22 1017

## 2022-11-12 NOTE — PROGRESS NOTES
Patient pleasant and cooperative this am, complained of abdominal pain that is controlled with prn pain medication. Assessment complete. NG continues to be hooked to low intermittent suction and patient tolerating well. Will continue to monitor.

## 2022-11-12 NOTE — CONSULTS
[facility]      Reason for Consult:  Medical management    History of Present Illness:  67 y.o. male with a history of cad, copd, colon cancer: adenocarcinoma, gerd, htn, hld, presents with ab pain that began last night. H modifies hx to include that he had usual about of bm, 3-4 qd on a every other day basis. This was associated with distension and nausea. Recent evaluation by [facility] for esophageal stricture. 3/22: last colonoscopy and EGD  CT imaging was reviewed consistent with partial obstruction. Informant(s) for H&P: chart, patient    REVIEW OF SYSTEMS:  Complete ROS performed with patient, pertinent positives and negatives are listed in the HPI. PMH:  Past Medical History:   Diagnosis Date    CAD (coronary artery disease)     Colon adenocarcinoma ([address] 1/30/2018    COPD (chronic obstructive pulmonary disease) (HCC)     Elevated triglycerides with high cholesterol 1/10/2017    Emphysema of lung (HCC)     GERD (gastroesophageal reflux disease)     Hx of splenectomy 6/13/2017    40 years ago ruptured    Hypertension     Macular degeneration, bilateral 03/2022    follows [facility]     Multiple lung nodules on CT 2017    Neuropathy     Osteoarthritis     Polyp of colon 12/15/2017    Robotic-assist Low anterior rectosigmoid resection 1/8/2018 DR. D'Amico Pathology invasive moderately differentiated  adenocarcinoma arising in association with tubulovillous adenoma 5 lymph nodes removed and negative for metastatic carcinoma    Pulmonary hypertension ([address] 1/10/2020    Echo Dr Liss Fernandes 12/30/19 SR, Left ventricular size is normal, mild concentric LVH, EF 55 - 20% Diastolic filling pattern indicates impaired relaxation, left atrium mildly dilated, mild AV sclerosis without stenosis, mild mitral regurgitation, mild tricuspid regurgitation, mild to moderate pulmonary hypertension, no pericardial effusion.     Sleep apnea Tick bite of abdomen 4/8/2019    Valvular heart disease 1/10/2020    Echo Dr Cricket Sood 12/30/19 SR, Left ventricular size is normal, mild concentric LVH, EF 55 - 80% Diastolic filling pattern indicates impaired relaxation, left atrium mildly dilated, mild AV sclerosis without stenosis, mild mitral regurgitation, mild tricuspid regurgitation, mild to moderate pulmonary hypertension, no pericardial effusion. See scanned echo       Surgical History:  Past Surgical History:   Procedure Laterality Date    CARDIAC CATHETERIZATION      EYE SURGERY Left 03/2022    HEMORRHOID SURGERY      SPLENECTOMY      TONSILLECTOMY AND ADENOIDECTOMY         Medications Prior to Admission:    Prior to Admission medications    Medication Sig Start Date End Date Taking?  Authorizing Provider   acetaminophen (TYLENOL) 500 MG tablet Take 500 mg by mouth every 6 hours as needed for Pain   Yes Historical Provider, MD   Hyprom-Naphaz-Polysorb-Zn Sulf (CLEAR EYES COMPLETE OP) Apply to eye    Historical Provider, MD   albuterol sulfate HFA (PROAIR HFA) 108 (90 Base) MCG/ACT inhaler Inhale 2 puffs into the lungs every 6 hours as needed for Wheezing 11/7/22   CAITLIN Pinedo CNP   ramipril (ALTACE) 2.5 MG capsule Take 1 capsule by mouth daily 11/7/22   CAITLIN Pinedo CNP   omeprazole (PRILOSEC) 40 MG delayed release capsule Take 1 capsule by mouth daily 11/7/22   CAITLIN Pinedo CNP   zoster recombinant adjuvanted vaccine [facility]) 50 MCG/0.5ML SUSR injection Inject 0.5 mLs into the muscle See Admin Instructions 1 dose now and repeat in 2-6 months 11/7/22 5/6/23  CAITLIN Pinedo CNP   baclofen (LIORESAL) 10 MG tablet Take 1 tablet by mouth nightly as needed (muscle spasms) 6/30/22   CAITLIN Pinedo CNP   Omega-3 Fatty Acids (FISH OIL) 1000 MG CAPS Take 3,000 mg by mouth 3 times daily    Historical Provider, MD   Multiple Vitamins-Minerals (ICAPS AREDS FORMULA PO) Take by mouth    Historical Provider, MD Gluc-Chonn-MSM-Boswellia-Vit D (GLUCOSAMINE CHONDROITIN + D3 PO) Take 1 tablet by mouth daily    Historical Provider, MD Alejandrina Pettit 62.5-25 MCG/INH AEPB inhaler Inhale 1 puff into the lungs daily One puff inhalation daily 11/1/21   Jigar Blanton, APRN - CNP       Allergies:    Penicillins    Social History:    reports that he has been smoking cigarettes. He started smoking about 60 years ago. He has a 15.00 pack-year smoking history. He has never used smokeless tobacco. He reports current alcohol use. He reports that he does not use drugs. Family History:   family history includes Cancer in his father, mother, and sister.     PHYSICAL EXAM:  Vitals:  /69   Pulse 65   Temp 98.5 °F (36.9 °C) (Oral)   Resp 18   Ht 6' (1.829 m)   Wt 172 lb (78 kg)   SpO2 94%   BMI 23.33 kg/m²     General Appearance: alert and oriented to person, place and time and in no acute distress  Skin: warm and dry  Head: normocephalic and atraumatic  Eyes: pupils equal, round, and reactive to light, extraocular eye movements intact, conjunctivae normal  Neck: neck supple and non tender without mass   Pulmonary/Chest: clear to auscultation bilaterally- no wheezes, rales or rhonchi, normal air movement, no respiratory distress  Cardiovascular: normal rate, normal S1 and S2 and no carotid bruits  Abdomen: soft, non-tender, non-distended, normal bowel sounds, no masses or organomegaly  Extremities: no cyanosis, no clubbing and no edema  Neurologic: no cranial nerve deficit and speech normal        LABS:  Recent Labs     11/11/22  1135 11/12/22  0341    135   K 4.7 4.2    99   CO2 25 26   BUN 17 17   CREATININE 0.9 0.8   GLUCOSE 110* 108*   CALCIUM 9.7 9.1       Recent Labs     11/11/22  1135 11/12/22  0341   WBC 14.2* 12.8*   RBC 4.81 4.78   HGB 14.7 14.6   HCT 44.5 43.1   MCV 92.5 90.2   MCH 30.6 30.5   MCHC 33.0 33.9   RDW 14.1 14.1    327   MPV 9.3 9.4       No results for input(s): POCGLU in the last 72 hours. Radiology:   XR ABDOMEN FOR NG/OG/NE TUBE PLACEMENT   Final Result   1. Catheter is in the proximal stomach as described, side hole around the   diaphragm level/esophageal hiatus   2. Bowel dilation         CT ABDOMEN PELVIS W IV CONTRAST Additional Contrast? None   Final Result   Fluid-filled dilated and air-filled loops of mid small bowel with   decompression both distally and normal appearance proximally. There is   fecalization of the terminal ileum. Findings suggest partial obstruction. Transition point is seen in the lower abdomen. The appendix is not well   visualized however no inflammatory changes in the right lower quadrant. Stool scattered throughout the colon. Unexplained mildly enlarged lymph nodes seen throughout the retroperitoneum   and pelvic sidewalls. Continued follow-up is recommended for stability. EKG: Sinus rhythm with premature atrial complexes with aberrant conduction  Low voltage QRS  Right bundle branch block  Possible Inferior infarct , age undetermined  Cannot rule out Anterior infarct , age undetermined  Abnormal ECG  No previous ECGs available    ASSESSMENT:      Principal Problem:    SBO (small bowel obstruction) ([address]  Resolved Problems:    * No resolved hospital problems. *      PLAN:    Sbo: npo diet and management per primary surgical service  Comitted smoker: he bleleives smoking is good for him despite trying to give him further facts. He agreed to take my adivce nuicely but further discussion would seem to offer no further benefit due to his belief systems  Htn:holding po meds due to #1. Iv prn bb  Copd o2 if needed, inhalers  Gerd iv ppi  Jim RN to find out if patient is supposed to be on CPAP hs for sleep apnea then continue home settings qHS  CAD and valvular heart disease: monitor closely until back on po meds.   Iv bb prn   Full code  Dvt prophy lovenox  Dispo: home when ready          Electronically signed by Janet Laguerre MD on 11/12/2022 at 8:52 AM    NOTE: This report was transcribed using voice recognition software. Every effort was made to ensure accuracy; however, inadvertent computerized transcription errors may be present.

## 2022-11-13 ENCOUNTER — ANESTHESIA (OUTPATIENT)
Dept: OPERATING ROOM | Age: 72
DRG: 329 | End: 2022-11-13
Payer: MEDICARE

## 2022-11-13 LAB
ALBUMIN SERPL-MCNC: 4 G/DL (ref 3.5–5.2)
ALP BLD-CCNC: 91 U/L (ref 40–129)
ALT SERPL-CCNC: 13 U/L (ref 0–40)
ANION GAP SERPL CALCULATED.3IONS-SCNC: 12 MMOL/L (ref 7–16)
AST SERPL-CCNC: 21 U/L (ref 0–39)
BASOPHILS ABSOLUTE: 0.02 E9/L (ref 0–0.2)
BASOPHILS RELATIVE PERCENT: 0.5 % (ref 0–2)
BILIRUB SERPL-MCNC: 0.5 MG/DL (ref 0–1.2)
BUN BLDV-MCNC: 22 MG/DL (ref 6–23)
CALCIUM SERPL-MCNC: 9.5 MG/DL (ref 8.6–10.2)
CHLORIDE BLD-SCNC: 99 MMOL/L (ref 98–107)
CO2: 26 MMOL/L (ref 22–29)
CREAT SERPL-MCNC: 0.9 MG/DL (ref 0.7–1.2)
EOSINOPHILS ABSOLUTE: 0 E9/L (ref 0.05–0.5)
EOSINOPHILS RELATIVE PERCENT: 0 % (ref 0–6)
GFR SERPL CREATININE-BSD FRML MDRD: >60 ML/MIN/1.73
GLUCOSE BLD-MCNC: 113 MG/DL (ref 74–99)
HCT VFR BLD CALC: 45.8 % (ref 37–54)
HEMOGLOBIN: 15.2 G/DL (ref 12.5–16.5)
IMMATURE GRANULOCYTES #: 0.01 E9/L
IMMATURE GRANULOCYTES %: 0.2 % (ref 0–5)
LYMPHOCYTES ABSOLUTE: 1.4 E9/L (ref 1.5–4)
LYMPHOCYTES RELATIVE PERCENT: 33.6 % (ref 20–42)
MCH RBC QN AUTO: 30.2 PG (ref 26–35)
MCHC RBC AUTO-ENTMCNC: 33.2 % (ref 32–34.5)
MCV RBC AUTO: 91.1 FL (ref 80–99.9)
MONOCYTES ABSOLUTE: 0.77 E9/L (ref 0.1–0.95)
MONOCYTES RELATIVE PERCENT: 18.5 % (ref 2–12)
NEUTROPHILS ABSOLUTE: 1.97 E9/L (ref 1.8–7.3)
NEUTROPHILS RELATIVE PERCENT: 47.2 % (ref 43–80)
PDW BLD-RTO: 14.2 FL (ref 11.5–15)
PLATELET # BLD: 357 E9/L (ref 130–450)
PMV BLD AUTO: 9.8 FL (ref 7–12)
POTASSIUM REFLEX MAGNESIUM: 4.3 MMOL/L (ref 3.5–5)
RBC # BLD: 5.03 E12/L (ref 3.8–5.8)
SODIUM BLD-SCNC: 137 MMOL/L (ref 132–146)
TOTAL PROTEIN: 6.5 G/DL (ref 6.4–8.3)
WBC # BLD: 4.2 E9/L (ref 4.5–11.5)

## 2022-11-13 PROCEDURE — 88307 TISSUE EXAM BY PATHOLOGIST: CPT

## 2022-11-13 PROCEDURE — 6360000002 HC RX W HCPCS: Performed by: INTERNAL MEDICINE

## 2022-11-13 PROCEDURE — 36415 COLL VENOUS BLD VENIPUNCTURE: CPT

## 2022-11-13 PROCEDURE — 2580000003 HC RX 258: Performed by: STUDENT IN AN ORGANIZED HEALTH CARE EDUCATION/TRAINING PROGRAM

## 2022-11-13 PROCEDURE — 1200000000 HC SEMI PRIVATE

## 2022-11-13 PROCEDURE — 3600000004 HC SURGERY LEVEL 4 BASE: Performed by: SURGERY

## 2022-11-13 PROCEDURE — 2500000003 HC RX 250 WO HCPCS: Performed by: NURSE ANESTHETIST, CERTIFIED REGISTERED

## 2022-11-13 PROCEDURE — 99232 SBSQ HOSP IP/OBS MODERATE 35: CPT | Performed by: INTERNAL MEDICINE

## 2022-11-13 PROCEDURE — 0DNU0ZZ RELEASE OMENTUM, OPEN APPROACH: ICD-10-PCS | Performed by: SURGERY

## 2022-11-13 PROCEDURE — 6360000002 HC RX W HCPCS

## 2022-11-13 PROCEDURE — 44120 REMOVAL OF SMALL INTESTINE: CPT | Performed by: SURGERY

## 2022-11-13 PROCEDURE — C1765 ADHESION BARRIER: HCPCS | Performed by: SURGERY

## 2022-11-13 PROCEDURE — 6360000002 HC RX W HCPCS: Performed by: STUDENT IN AN ORGANIZED HEALTH CARE EDUCATION/TRAINING PROGRAM

## 2022-11-13 PROCEDURE — 6370000000 HC RX 637 (ALT 250 FOR IP)

## 2022-11-13 PROCEDURE — 88341 IMHCHEM/IMCYTCHM EA ADD ANTB: CPT

## 2022-11-13 PROCEDURE — 6360000002 HC RX W HCPCS: Performed by: ANESTHESIOLOGY

## 2022-11-13 PROCEDURE — 6360000002 HC RX W HCPCS: Performed by: NURSE ANESTHETIST, CERTIFIED REGISTERED

## 2022-11-13 PROCEDURE — 2720000010 HC SURG SUPPLY STERILE: Performed by: SURGERY

## 2022-11-13 PROCEDURE — 7100000001 HC PACU RECOVERY - ADDTL 15 MIN: Performed by: SURGERY

## 2022-11-13 PROCEDURE — 94640 AIRWAY INHALATION TREATMENT: CPT

## 2022-11-13 PROCEDURE — 3700000001 HC ADD 15 MINUTES (ANESTHESIA): Performed by: SURGERY

## 2022-11-13 PROCEDURE — 7100000000 HC PACU RECOVERY - FIRST 15 MIN: Performed by: SURGERY

## 2022-11-13 PROCEDURE — 2709999900 HC NON-CHARGEABLE SUPPLY: Performed by: SURGERY

## 2022-11-13 PROCEDURE — 85025 COMPLETE CBC W/AUTO DIFF WBC: CPT

## 2022-11-13 PROCEDURE — 88342 IMHCHEM/IMCYTCHM 1ST ANTB: CPT

## 2022-11-13 PROCEDURE — 3600000014 HC SURGERY LEVEL 4 ADDTL 15MIN: Performed by: SURGERY

## 2022-11-13 PROCEDURE — C9113 INJ PANTOPRAZOLE SODIUM, VIA: HCPCS | Performed by: STUDENT IN AN ORGANIZED HEALTH CARE EDUCATION/TRAINING PROGRAM

## 2022-11-13 PROCEDURE — 3700000000 HC ANESTHESIA ATTENDED CARE: Performed by: SURGERY

## 2022-11-13 PROCEDURE — 0DT80ZZ RESECTION OF SMALL INTESTINE, OPEN APPROACH: ICD-10-PCS | Performed by: SURGERY

## 2022-11-13 PROCEDURE — 2500000003 HC RX 250 WO HCPCS

## 2022-11-13 PROCEDURE — 80053 COMPREHEN METABOLIC PANEL: CPT

## 2022-11-13 RX ORDER — NEOSTIGMINE METHYLSULFATE 1 MG/ML
INJECTION, SOLUTION INTRAVENOUS PRN
Status: DISCONTINUED | OUTPATIENT
Start: 2022-11-13 | End: 2022-11-13 | Stop reason: SDUPTHER

## 2022-11-13 RX ORDER — METOCLOPRAMIDE HYDROCHLORIDE 5 MG/ML
10 INJECTION INTRAMUSCULAR; INTRAVENOUS ONCE
Status: COMPLETED | OUTPATIENT
Start: 2022-11-13 | End: 2022-11-13

## 2022-11-13 RX ORDER — SODIUM CHLORIDE 9 MG/ML
INJECTION, SOLUTION INTRAVENOUS PRN
Status: DISCONTINUED | OUTPATIENT
Start: 2022-11-13 | End: 2022-11-13 | Stop reason: HOSPADM

## 2022-11-13 RX ORDER — SODIUM CHLORIDE 0.9 % (FLUSH) 0.9 %
5-40 SYRINGE (ML) INJECTION PRN
Status: DISCONTINUED | OUTPATIENT
Start: 2022-11-13 | End: 2022-11-13 | Stop reason: HOSPADM

## 2022-11-13 RX ORDER — FAMOTIDINE 10 MG/ML
INJECTION, SOLUTION INTRAVENOUS
Status: COMPLETED
Start: 2022-11-13 | End: 2022-11-13

## 2022-11-13 RX ORDER — FAMOTIDINE 10 MG/ML
20 INJECTION, SOLUTION INTRAVENOUS ONCE
Status: COMPLETED | OUTPATIENT
Start: 2022-11-13 | End: 2022-11-13

## 2022-11-13 RX ORDER — DIPHENHYDRAMINE HYDROCHLORIDE 50 MG/ML
12.5 INJECTION INTRAMUSCULAR; INTRAVENOUS
Status: DISCONTINUED | OUTPATIENT
Start: 2022-11-13 | End: 2022-11-13 | Stop reason: HOSPADM

## 2022-11-13 RX ORDER — IPRATROPIUM BROMIDE AND ALBUTEROL SULFATE 2.5; .5 MG/3ML; MG/3ML
1 SOLUTION RESPIRATORY (INHALATION) ONCE
Status: COMPLETED | OUTPATIENT
Start: 2022-11-13 | End: 2022-11-13

## 2022-11-13 RX ORDER — FENTANYL CITRATE 50 UG/ML
25 INJECTION, SOLUTION INTRAMUSCULAR; INTRAVENOUS EVERY 5 MIN PRN
Status: DISCONTINUED | OUTPATIENT
Start: 2022-11-13 | End: 2022-11-13 | Stop reason: HOSPADM

## 2022-11-13 RX ORDER — METOCLOPRAMIDE HYDROCHLORIDE 5 MG/ML
INJECTION INTRAMUSCULAR; INTRAVENOUS
Status: COMPLETED
Start: 2022-11-13 | End: 2022-11-13

## 2022-11-13 RX ORDER — PROCHLORPERAZINE EDISYLATE 5 MG/ML
5 INJECTION INTRAMUSCULAR; INTRAVENOUS
Status: DISCONTINUED | OUTPATIENT
Start: 2022-11-13 | End: 2022-11-13 | Stop reason: HOSPADM

## 2022-11-13 RX ORDER — METHOCARBAMOL 100 MG/ML
1000 INJECTION, SOLUTION INTRAMUSCULAR; INTRAVENOUS
Status: COMPLETED | OUTPATIENT
Start: 2022-11-13 | End: 2022-11-13

## 2022-11-13 RX ORDER — IPRATROPIUM BROMIDE AND ALBUTEROL SULFATE 2.5; .5 MG/3ML; MG/3ML
SOLUTION RESPIRATORY (INHALATION)
Status: COMPLETED
Start: 2022-11-13 | End: 2022-11-13

## 2022-11-13 RX ORDER — MEPERIDINE HYDROCHLORIDE 25 MG/ML
INJECTION INTRAMUSCULAR; INTRAVENOUS; SUBCUTANEOUS
Status: COMPLETED
Start: 2022-11-13 | End: 2022-11-13

## 2022-11-13 RX ORDER — DEXAMETHASONE SODIUM PHOSPHATE 10 MG/ML
INJECTION, SOLUTION INTRAMUSCULAR; INTRAVENOUS PRN
Status: DISCONTINUED | OUTPATIENT
Start: 2022-11-13 | End: 2022-11-13 | Stop reason: SDUPTHER

## 2022-11-13 RX ORDER — MEPERIDINE HYDROCHLORIDE 25 MG/ML
12.5 INJECTION INTRAMUSCULAR; INTRAVENOUS; SUBCUTANEOUS ONCE
Status: COMPLETED | OUTPATIENT
Start: 2022-11-13 | End: 2022-11-13

## 2022-11-13 RX ORDER — LABETALOL HYDROCHLORIDE 5 MG/ML
5 INJECTION, SOLUTION INTRAVENOUS
Status: DISCONTINUED | OUTPATIENT
Start: 2022-11-13 | End: 2022-11-13 | Stop reason: HOSPADM

## 2022-11-13 RX ORDER — METHOCARBAMOL 100 MG/ML
INJECTION, SOLUTION INTRAMUSCULAR; INTRAVENOUS
Status: COMPLETED
Start: 2022-11-13 | End: 2022-11-13

## 2022-11-13 RX ORDER — KETOROLAC TROMETHAMINE 15 MG/ML
INJECTION, SOLUTION INTRAMUSCULAR; INTRAVENOUS
Status: DISPENSED
Start: 2022-11-13 | End: 2022-11-14

## 2022-11-13 RX ORDER — FENTANYL CITRATE 50 UG/ML
INJECTION, SOLUTION INTRAMUSCULAR; INTRAVENOUS PRN
Status: DISCONTINUED | OUTPATIENT
Start: 2022-11-13 | End: 2022-11-13 | Stop reason: SDUPTHER

## 2022-11-13 RX ORDER — GLYCOPYRROLATE 0.2 MG/ML
INJECTION INTRAMUSCULAR; INTRAVENOUS PRN
Status: DISCONTINUED | OUTPATIENT
Start: 2022-11-13 | End: 2022-11-13 | Stop reason: SDUPTHER

## 2022-11-13 RX ORDER — KETAMINE HYDROCHLORIDE 50 MG/ML
INJECTION, SOLUTION, CONCENTRATE INTRAMUSCULAR; INTRAVENOUS PRN
Status: DISCONTINUED | OUTPATIENT
Start: 2022-11-13 | End: 2022-11-13 | Stop reason: SDUPTHER

## 2022-11-13 RX ORDER — ACETAMINOPHEN 500 MG
TABLET ORAL
Status: DISCONTINUED
Start: 2022-11-13 | End: 2022-11-13 | Stop reason: WASHOUT

## 2022-11-13 RX ORDER — LIDOCAINE HYDROCHLORIDE 20 MG/ML
INJECTION, SOLUTION INTRAVENOUS PRN
Status: DISCONTINUED | OUTPATIENT
Start: 2022-11-13 | End: 2022-11-13 | Stop reason: SDUPTHER

## 2022-11-13 RX ORDER — PROPOFOL 10 MG/ML
INJECTION, EMULSION INTRAVENOUS PRN
Status: DISCONTINUED | OUTPATIENT
Start: 2022-11-13 | End: 2022-11-13 | Stop reason: SDUPTHER

## 2022-11-13 RX ORDER — HYDRALAZINE HYDROCHLORIDE 20 MG/ML
5 INJECTION INTRAMUSCULAR; INTRAVENOUS
Status: DISCONTINUED | OUTPATIENT
Start: 2022-11-13 | End: 2022-11-13 | Stop reason: HOSPADM

## 2022-11-13 RX ORDER — ONDANSETRON 2 MG/ML
INJECTION INTRAMUSCULAR; INTRAVENOUS PRN
Status: DISCONTINUED | OUTPATIENT
Start: 2022-11-13 | End: 2022-11-13 | Stop reason: SDUPTHER

## 2022-11-13 RX ORDER — KETOROLAC TROMETHAMINE 30 MG/ML
15 INJECTION, SOLUTION INTRAMUSCULAR; INTRAVENOUS ONCE
Status: COMPLETED | OUTPATIENT
Start: 2022-11-13 | End: 2022-11-13

## 2022-11-13 RX ORDER — ROCURONIUM BROMIDE 10 MG/ML
INJECTION, SOLUTION INTRAVENOUS PRN
Status: DISCONTINUED | OUTPATIENT
Start: 2022-11-13 | End: 2022-11-13 | Stop reason: SDUPTHER

## 2022-11-13 RX ORDER — ACETAMINOPHEN 500 MG
1000 TABLET ORAL ONCE
Status: DISCONTINUED | OUTPATIENT
Start: 2022-11-13 | End: 2022-11-13 | Stop reason: HOSPADM

## 2022-11-13 RX ORDER — SODIUM CHLORIDE 0.9 % (FLUSH) 0.9 %
5-40 SYRINGE (ML) INJECTION EVERY 12 HOURS SCHEDULED
Status: DISCONTINUED | OUTPATIENT
Start: 2022-11-13 | End: 2022-11-13 | Stop reason: HOSPADM

## 2022-11-13 RX ORDER — CIPROFLOXACIN 2 MG/ML
INJECTION, SOLUTION INTRAVENOUS PRN
Status: DISCONTINUED | OUTPATIENT
Start: 2022-11-13 | End: 2022-11-13 | Stop reason: SDUPTHER

## 2022-11-13 RX ADMIN — FENTANYL CITRATE 50 MCG: 50 INJECTION, SOLUTION INTRAMUSCULAR; INTRAVENOUS at 11:23

## 2022-11-13 RX ADMIN — KETAMINE HYDROCHLORIDE 30 MG: 50 INJECTION INTRAMUSCULAR; INTRAVENOUS at 10:49

## 2022-11-13 RX ADMIN — IPRATROPIUM BROMIDE 0.5 MG: 0.5 SOLUTION RESPIRATORY (INHALATION) at 14:00

## 2022-11-13 RX ADMIN — ROCURONIUM BROMIDE 25 MG: 10 INJECTION, SOLUTION INTRAVENOUS at 11:27

## 2022-11-13 RX ADMIN — FAMOTIDINE 20 MG: 10 INJECTION, SOLUTION INTRAVENOUS at 09:12

## 2022-11-13 RX ADMIN — MORPHINE SULFATE 2 MG: 2 INJECTION, SOLUTION INTRAMUSCULAR; INTRAVENOUS at 19:39

## 2022-11-13 RX ADMIN — Medication 0.5 MG: at 12:10

## 2022-11-13 RX ADMIN — FENTANYL CITRATE 150 MCG: 50 INJECTION, SOLUTION INTRAMUSCULAR; INTRAVENOUS at 09:58

## 2022-11-13 RX ADMIN — IPRATROPIUM BROMIDE AND ALBUTEROL SULFATE 1 AMPULE: .5; 2.5 SOLUTION RESPIRATORY (INHALATION) at 12:50

## 2022-11-13 RX ADMIN — SODIUM CHLORIDE, PRESERVATIVE FREE 40 MG: 5 INJECTION INTRAVENOUS at 08:30

## 2022-11-13 RX ADMIN — MORPHINE SULFATE 4 MG: 4 INJECTION, SOLUTION INTRAMUSCULAR; INTRAVENOUS at 13:58

## 2022-11-13 RX ADMIN — KETAMINE HYDROCHLORIDE 20 MG: 50 INJECTION INTRAMUSCULAR; INTRAVENOUS at 09:58

## 2022-11-13 RX ADMIN — METOCLOPRAMIDE HYDROCHLORIDE 10 MG: 5 INJECTION INTRAMUSCULAR; INTRAVENOUS at 09:11

## 2022-11-13 RX ADMIN — HYDROMORPHONE HYDROCHLORIDE 0.5 MG: 1 INJECTION, SOLUTION INTRAMUSCULAR; INTRAVENOUS; SUBCUTANEOUS at 12:10

## 2022-11-13 RX ADMIN — IPRATROPIUM BROMIDE 0.5 MG: 0.5 SOLUTION RESPIRATORY (INHALATION) at 18:05

## 2022-11-13 RX ADMIN — DEXAMETHASONE SODIUM PHOSPHATE 10 MG: 10 INJECTION INTRAMUSCULAR; INTRAVENOUS at 10:05

## 2022-11-13 RX ADMIN — ARFORMOTEROL TARTRATE 15 MCG: 15 SOLUTION RESPIRATORY (INHALATION) at 06:11

## 2022-11-13 RX ADMIN — CIPROFLOXACIN 400 MG: 2 INJECTION, SOLUTION INTRAVENOUS at 10:07

## 2022-11-13 RX ADMIN — IPRATROPIUM BROMIDE 0.5 MG: 0.5 SOLUTION RESPIRATORY (INHALATION) at 06:11

## 2022-11-13 RX ADMIN — KETOROLAC TROMETHAMINE 15 MG: 30 INJECTION, SOLUTION INTRAMUSCULAR at 12:37

## 2022-11-13 RX ADMIN — FENTANYL CITRATE 50 MCG: 50 INJECTION, SOLUTION INTRAMUSCULAR; INTRAVENOUS at 11:17

## 2022-11-13 RX ADMIN — METHOCARBAMOL 1000 MG: 100 INJECTION, SOLUTION INTRAMUSCULAR; INTRAVENOUS at 12:12

## 2022-11-13 RX ADMIN — GLYCOPYRROLATE 0.6 MG: 0.2 INJECTION, SOLUTION INTRAMUSCULAR; INTRAVENOUS at 11:40

## 2022-11-13 RX ADMIN — MEPERIDINE HYDROCHLORIDE 12.5 MG: 25 INJECTION INTRAMUSCULAR; INTRAVENOUS; SUBCUTANEOUS at 13:01

## 2022-11-13 RX ADMIN — Medication 3 MG: at 11:40

## 2022-11-13 RX ADMIN — ARFORMOTEROL TARTRATE 15 MCG: 15 SOLUTION RESPIRATORY (INHALATION) at 18:04

## 2022-11-13 RX ADMIN — IPRATROPIUM BROMIDE AND ALBUTEROL SULFATE 1 AMPULE: 2.5; .5 SOLUTION RESPIRATORY (INHALATION) at 12:50

## 2022-11-13 RX ADMIN — MORPHINE SULFATE 4 MG: 4 INJECTION, SOLUTION INTRAMUSCULAR; INTRAVENOUS at 16:23

## 2022-11-13 RX ADMIN — ONDANSETRON 4 MG: 2 INJECTION INTRAMUSCULAR; INTRAVENOUS at 11:40

## 2022-11-13 RX ADMIN — PROPOFOL 150 MG: 10 INJECTION, EMULSION INTRAVENOUS at 09:58

## 2022-11-13 RX ADMIN — MEPERIDINE HYDROCHLORIDE 12.5 MG: 25 INJECTION, SOLUTION INTRAMUSCULAR; INTRAVENOUS; SUBCUTANEOUS at 13:01

## 2022-11-13 RX ADMIN — LIDOCAINE HYDROCHLORIDE 100 MG: 20 INJECTION, SOLUTION INTRAVENOUS at 09:58

## 2022-11-13 RX ADMIN — ROCURONIUM BROMIDE 50 MG: 10 INJECTION, SOLUTION INTRAVENOUS at 09:58

## 2022-11-13 ASSESSMENT — PAIN DESCRIPTION - ORIENTATION
ORIENTATION: MID
ORIENTATION: MID
ORIENTATION: RIGHT;LEFT

## 2022-11-13 ASSESSMENT — PAIN DESCRIPTION - LOCATION
LOCATION: ABDOMEN

## 2022-11-13 ASSESSMENT — PAIN SCALES - GENERAL
PAINLEVEL_OUTOF10: 6
PAINLEVEL_OUTOF10: 10
PAINLEVEL_OUTOF10: 10
PAINLEVEL_OUTOF10: 4
PAINLEVEL_OUTOF10: 7

## 2022-11-13 ASSESSMENT — ENCOUNTER SYMPTOMS: DYSPNEA ACTIVITY LEVEL: NO INTERVAL CHANGE

## 2022-11-13 ASSESSMENT — PAIN DESCRIPTION - DESCRIPTORS
DESCRIPTORS: ACHING;SHARP
DESCRIPTORS: ACHING
DESCRIPTORS: SORE;ACHING

## 2022-11-13 ASSESSMENT — LIFESTYLE VARIABLES: SMOKING_STATUS: 1

## 2022-11-13 ASSESSMENT — PAIN DESCRIPTION - FREQUENCY: FREQUENCY: CONTINUOUS

## 2022-11-13 ASSESSMENT — PAIN DESCRIPTION - PAIN TYPE
TYPE: SURGICAL PAIN
TYPE: SURGICAL PAIN

## 2022-11-13 ASSESSMENT — PAIN - FUNCTIONAL ASSESSMENT
PAIN_FUNCTIONAL_ASSESSMENT: PREVENTS OR INTERFERES SOME ACTIVE ACTIVITIES AND ADLS
PAIN_FUNCTIONAL_ASSESSMENT: PREVENTS OR INTERFERES SOME ACTIVE ACTIVITIES AND ADLS

## 2022-11-13 ASSESSMENT — COPD QUESTIONNAIRES: CAT_SEVERITY: NO INTERVAL CHANGE

## 2022-11-13 NOTE — ANESTHESIA POSTPROCEDURE EVALUATION
Department of Anesthesiology  Postprocedure Note    Patient: Marianela Miranda  MRN: [id number]  YOB: 1950  Date of evaluation: 11/13/2022      Procedure Summary     Date: 11/13/22 Room / Location: [address] [place] [address] / [address] [place] [address]    Anesthesia Start: 1656 Anesthesia Stop: 1150    Procedure: DIAGNOSTIC LAPAROSCOPY WITH POSSIBLE BOWEL RESECTION LAPAROSCOPIC Diagnosis:       Intestinal adhesions with partial obstruction ([address]      (POSSIBLE BOWEL OBSTRUCTION)    Surgeons: Karime Sigala MD Responsible Provider: Navneet Garces DO    Anesthesia Type: General ASA Status: 3          Anesthesia Type: General    Scarlett Phase I: Scarlett Score: 8    Scarlett Phase II:        Anesthesia Post Evaluation    Patient location during evaluation: bedside  Patient participation: complete - patient participated  Level of consciousness: awake  Pain score: 4  Airway patency: patent  Nausea & Vomiting: no vomiting and no nausea  Complications: no  Cardiovascular status: hemodynamically stable  Respiratory status: acceptable and face mask  Hydration status: stable  Comments: Simple mask with oxygen saturation 92-93%. Some coarse breath sounds in pacu - DuoNeb ordered. Otherwise, recovering as expected.   Multimodal analgesia pain management approach

## 2022-11-13 NOTE — NURSE NAVIGATOR
Pt report received from level 4 RN. Pt assessed, no changes from previous assessment. Pt states that his pain medications are currently working and has no complaints at this time.
no

## 2022-11-13 NOTE — ANESTHESIA PRE PROCEDURE
Department of Anesthesiology  Preprocedure Note       Name:  Naomy Melendez   Age:  67 y.o.  :  1950                                          MRN:  [id number]         Date:  2022      Surgeon: Rosa Alvarez):  Marisela Welch MD    Procedure: Procedure(s):  DIAGNOSTIC LAPAROSCOPY WITH POSSIBLE BOWEL RESECTION LAPAROSCOPIC    Medications prior to admission:   Prior to Admission medications    Medication Sig Start Date End Date Taking?  Authorizing Provider   acetaminophen (TYLENOL) 500 MG tablet Take 500 mg by mouth every 6 hours as needed for Pain   Yes Historical Provider, MD Reidrom-Naphaz-Polysorb-Zn Sulf (CLEAR EYES COMPLETE OP) Apply to eye    Historical Provider, MD   albuterol sulfate HFA (PROAIR HFA) 108 (90 Base) MCG/ACT inhaler Inhale 2 puffs into the lungs every 6 hours as needed for Wheezing 22   CAITLIN Chong CNP   ramipril (ALTACE) 2.5 MG capsule Take 1 capsule by mouth daily 22   CAITLIN Chong CNP   omeprazole (PRILOSEC) 40 MG delayed release capsule Take 1 capsule by mouth daily 22   CAITLIN Chong CNP   zoster recombinant adjuvanted vaccine [facility]) 50 MCG/0.5ML SUSR injection Inject 0.5 mLs into the muscle See Admin Instructions 1 dose now and repeat in 2-6 months 22  CAITLIN Chong CNP   baclofen (LIORESAL) 10 MG tablet Take 1 tablet by mouth nightly as needed (muscle spasms) 22   CAITLIN Chong CNP   Omega-3 Fatty Acids (FISH OIL) 1000 MG CAPS Take 3,000 mg by mouth 3 times daily    Historical Provider, MD   Multiple Vitamins-Minerals (ICAPS AREDS FORMULA PO) Take by mouth    Historical Provider, MD   Gluc-Chonn-MSM-Boswellia-Vit D (GLUCOSAMINE CHONDROITIN + D3 PO) Take 1 tablet by mouth daily    Historical Provider, MD Yohan Toscano ELLIPTA 62.5-25 MCG/INH AEPB inhaler Inhale 1 puff into the lungs daily One puff inhalation daily 21   CAITLIN Chong CNP       Current medications:    Current Facility-Administered Medications   Medication Dose Route Frequency Provider Last Rate Last Admin    Arformoterol Tartrate (BROVANA) nebulizer solution 15 mcg  15 mcg Nebulization BID Liset Zimmerman MD   15 mcg at 11/13/22 2832    And    ipratropium (ATROVENT) 0.02 % nebulizer solution 0.5 mg  0.5 mg Nebulization 4x daily Liset Zimmerman MD   0.5 mg at 11/13/22 4251    oxymetazoline (AFRIN) 0.05 % nasal spray 2 spray  2 spray Each Nostril Once Gianmarino C Gianfrate, DO        lidocaine (XYLOCAINE) 2 % jelly   Topical Once Gianmarino C Gianfrate, DO        sodium chloride flush 0.9 % injection 10 mL  10 mL IntraVENous 2 times per day Gianmarino C Gianfrate, DO   10 mL at 11/12/22 1117    sodium chloride flush 0.9 % injection 10 mL  10 mL IntraVENous PRN Gianmarino C Gianfrate, DO        0.9 % sodium chloride infusion   IntraVENous PRN Gianmarino C Gianfrate, DO        ondansetron (ZOFRAN-ODT) disintegrating tablet 4 mg  4 mg Oral Q8H PRN Gianmarino C Gianfrate, DO        Or    ondansetron (ZOFRAN) injection 4 mg  4 mg IntraVENous Q6H PRN Gianmarino C Gianfrate, DO        enoxaparin (LOVENOX) injection 40 mg  40 mg SubCUTAneous Daily Gianmarino C Gianfrate, DO   40 mg at 11/12/22 0948    lactated ringers infusion   IntraVENous Continuous Gianmarino C Gianfrate,  mL/hr at 11/11/22 1742 New Bag at 11/11/22 1742    morphine (PF) injection 2 mg  2 mg IntraVENous Q2H PRN Gianmarino C Gianfrate, DO   2 mg at 11/12/22 1958    Or    morphine injection 4 mg  4 mg IntraVENous Q2H PRN Gianmarino C Gianfrate, DO        albuterol sulfate HFA (PROVENTIL;VENTOLIN;PROAIR) 108 (90 Base) MCG/ACT inhaler 2 puff  2 puff Inhalation Q6H PRN Gianmarino C Gianfrate, DO        pantoprazole (PROTONIX) 40 mg in sodium chloride (PF) 0.9 % 10 mL injection  40 mg IntraVENous Daily Bobby BOSS Gianfrate, DO   40 mg at 11/12/22 0948    labetalol (NORMODYNE;TRANDATE) injection 10 mg  10 mg IntraVENous Q30 Min PRN Bobby BOSS DO Lien           Allergies: Allergies   Allergen Reactions    Penicillins Hives and Swelling       Problem List:    Patient Active Problem List   Diagnosis Code    Essential hypertension I10    Bradycardia R00.1    Nocturnal hypoxemia due to emphysema (HCC) J43.9, G47.36    Obstructive sleep apnea treated with continuous positive airway pressure (CPAP) G47.33, Z99.89    Centrilobular emphysema (HCC) J43.2    Gastroesophageal reflux disease with esophagitis K21.00    Vitreous degeneration H43.819    Presbyopia H52.4    After-cataract H26.40    Monocytosis D72.821    Esophageal stenosis K22.2    Chronic bilateral low back pain with left-sided sciatica M54.42, G89.29    Right anterior knee pain M25.561    Valvular heart disease I38    Pulmonary hypertension (HCC) I27.20    Enlarged aorta (HCC) I77.89    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 Z90.81    [facility] esophagus determined by endoscopy K22.70    Seborrheic dermatitis of scalp L21.9    Solitary pulmonary nodule R91.1    Fatigue R53.83    Need for meningococcal vaccination Z23    Need for shingles vaccine Z23    Flu vaccine need Z23    Personal history of tobacco use, presenting hazards to health Z87.891    SBO (small bowel obstruction) ([facility] [address] K56.609       Past Medical History:        Diagnosis Date    CAD (coronary artery disease)     Colon adenocarcinoma ([facility] [address] 1/30/2018    COPD (chronic obstructive pulmonary disease) (HCC)     Elevated triglycerides with high cholesterol 1/10/2017    Emphysema of lung (HCC)     GERD (gastroesophageal reflux disease)     Hx of splenectomy 6/13/2017    40 years ago ruptured    Hypertension     Macular degeneration, bilateral 03/2022    follows [facility]     Multiple lung nodules on CT 2017    Neuropathy     Osteoarthritis     Polyp of colon 12/15/2017    Robotic-assist Low anterior rectosigmoid resection 1/8/2018 DR. D'Amico Pathology invasive moderately differentiated adenocarcinoma arising in association with tubulovillous adenoma 5 lymph nodes removed and negative for metastatic carcinoma    Pulmonary hypertension ([facility] [address] 1/10/2020    Echo Dr Christiano Henley 12/30/19 SR, Left ventricular size is normal, mild concentric LVH, EF 55 - 43% Diastolic filling pattern indicates impaired relaxation, left atrium mildly dilated, mild AV sclerosis without stenosis, mild mitral regurgitation, mild tricuspid regurgitation, mild to moderate pulmonary hypertension, no pericardial effusion.  Sleep apnea     Tick bite of abdomen 4/8/2019    Valvular heart disease 1/10/2020    Echo Dr Christiano Henley 12/30/19 SR, Left ventricular size is normal, mild concentric LVH, EF 55 - 66% Diastolic filling pattern indicates impaired relaxation, left atrium mildly dilated, mild AV sclerosis without stenosis, mild mitral regurgitation, mild tricuspid regurgitation, mild to moderate pulmonary hypertension, no pericardial effusion.  See scanned echo       Past Surgical History:        Procedure Laterality Date    CARDIAC CATHETERIZATION      EYE SURGERY Left 03/2022    HEMORRHOID SURGERY      SPLENECTOMY      TONSILLECTOMY AND ADENOIDECTOMY         Social History:    Social History     Tobacco Use    Smoking status: Some Days     Packs/day: 0.50     Years: 30.00     Pack years: 15.00     Types: Cigarettes     Start date: 1/10/1962    Smokeless tobacco: Never   Substance Use Topics    Alcohol use: Yes     Comment: Social                                Ready to quit: Not Answered  Counseling given: Not Answered      Vital Signs (Current):   Vitals:    11/11/22 2140 11/12/22 1230 11/12/22 1741 11/13/22 0630   BP: 132/69 (!) 141/83 (!) 139/94 (!) 142/81   Pulse: 65 66 80 63   Resp: 18 18 17 16   Temp: 98.5 °F (36.9 °C) 98.5 °F (36.9 °C) 98.1 °F (36.7 °C) 97.7 °F (36.5 °C)   TempSrc: Oral Oral Oral Oral   SpO2: 94% 93% 94% 94%   Weight: 172 lb (78 kg)      Height: 6' (1.829 m) BP Readings from Last 3 Encounters:   11/13/22 (!) 142/81   11/07/22 110/62   06/30/22 116/70       NPO Status:                                                                                 BMI:   Wt Readings from Last 3 Encounters:   11/11/22 172 lb (78 kg)   11/07/22 175 lb (79.4 kg)   06/30/22 179 lb (81.2 kg)     Body mass index is 23.33 kg/m². CBC:   Lab Results   Component Value Date/Time    WBC 4.2 11/13/2022 04:01 AM    RBC 5.03 11/13/2022 04:01 AM    HGB 15.2 11/13/2022 04:01 AM    HCT 45.8 11/13/2022 04:01 AM    MCV 91.1 11/13/2022 04:01 AM    RDW 14.2 11/13/2022 04:01 AM     11/13/2022 04:01 AM       CMP:   Lab Results   Component Value Date/Time     11/13/2022 04:01 AM    K 4.3 11/13/2022 04:01 AM    CL 99 11/13/2022 04:01 AM    CO2 26 11/13/2022 04:01 AM    BUN 22 11/13/2022 04:01 AM    CREATININE 0.9 11/13/2022 04:01 AM    GFRAA >60 03/30/2022 09:22 AM    LABGLOM >60 11/13/2022 04:01 AM    GLUCOSE 113 11/13/2022 04:01 AM    PROT 6.5 11/13/2022 04:01 AM    CALCIUM 9.5 11/13/2022 04:01 AM    BILITOT 0.5 11/13/2022 04:01 AM    ALKPHOS 91 11/13/2022 04:01 AM    AST 21 11/13/2022 04:01 AM    ALT 13 11/13/2022 04:01 AM       POC Tests: No results for input(s): POCGLU, POCNA, POCK, POCCL, POCBUN, POCHEMO, POCHCT in the last 72 hours.     Coags: No results found for: PROTIME, INR, APTT    HCG (If Applicable): No results found for: PREGTESTUR, PREGSERUM, HCG, HCGQUANT     ABGs: No results found for: PHART, PO2ART, VSL9LTT, UKY5QZD, BEART, T8XJETRV     Type & Screen (If Applicable):  No results found for: LABABO, LABRH    Drug/Infectious Status (If Applicable):  No results found for: HIV, HEPCAB    COVID-19 Screening (If Applicable): No results found for: COVID19      CT:  Narrative   EXAMINATION:   CT OF THE ABDOMEN AND PELVIS WITHOUT CONTRAST 11/12/2022 12:22 pm       TECHNIQUE:   CT of the abdomen and pelvis was performed without the administration of   intravenous contrast. Multiplanar reformatted images are provided for review. Automated exposure control, iterative reconstruction, and/or weight based   adjustment of the mA/kV was utilized to reduce the radiation dose to as low   as reasonably achievable.       COMPARISON:   11/11/2022       HISTORY:   ORDERING SYSTEM PROVIDED HISTORY: rule out sbo   TECHNOLOGIST PROVIDED HISTORY:   Reason for exam:->rule out sbo   Additional Contrast?->Oral       FINDINGS:   Lower Chest: Lung bases appear clear.  Very slight linear basilar atelectasis   on the right. Bibiana Heck is a gastric catheter passing into the stomach.       Organs: Liver is normal in size.  There is a benign-appearing cyst in the   right hepatic lobe.  Spleen is not present in the normal anatomic location. There is a lobular soft tissue density just superior to the left kidney which   may represent a regenerating splenule.  Splenule contains a simple cyst.   Pancreas appears normal.  Gallbladder contains contrast material from   previous CT examination.  The adrenal glands are normal.  Kidneys have a   normal CT appearance.       GI/Bowel: Worsening of dilated small bowel loops in the central abdomen with   decompression of the terminal ileum and left colon.  There is mild ascites,   new/increased compared to the prior study.  No free air detected.  Multiple   small nonenlarged lymph nodes are noted in the central small bowel mesentery.       Pelvis: Small to moderate free pelvic fluid.  Urinary bladder fills normally   with contrast.  Prostate gland is normal.       Peritoneum/Retroperitoneum: Para-aortic and aortocaval lymphadenopathy,   similar to the prior study.  There is ASVD of the abdominal aorta without   aneurysm.       Bones/Soft Tissues: There is left convexity scoliosis and lumbar disc   degenerative disease.  No lytic or blastic lesions identified in the osseous   structures.  Sclerosis in the proximal left femur may be the result of   previous IM april which has been removed. Anesthesia Evaluation  Patient summary reviewed and Nursing notes reviewed no history of anesthetic complications:   Airway: Mallampati: III  TM distance: >3 FB   Neck ROM: limited  Mouth opening: > = 3 FB   Dental:          Pulmonary:   (+) COPD (Centrilobular emphysema with an oxygen saturation of 94% on RA): no interval change and moderate,  sleep apnea (Nocturnal hypoxia): on CPAP,  decreased breath sounds: bilateral current smoker          Patient did not smoke on day of surgery. ROS comment: Multiple pulmonary nodules  PE comment: Prolonged expiratory phase and coarse Cardiovascular:  Exercise tolerance: good (>4 METS),   (+) hypertension: moderate and no interval change, valvular problems/murmurs: MR, CAD: no interval change, DARDEN: no interval change, murmur: Grade 1, pulmonary hypertension (RVSP unknown - no echocardiogram available): no interval change, mild and moderate, hyperlipidemia    (-) past MI, CABG/stent, dysrhythmias and  angina    ECG reviewed  Rhythm: irregular  Rate: normal           Beta Blocker:  Not on Beta Blocker      ROS comment: EKG:  Sinus rhythm with premature atrial complexes with aberrant conduction  Low voltage QRS  Right bundle branch block  Possible Inferior infarct , age undetermined  Cannot rule out Anterior infarct , age undetermined  Abnormal ECG  No previous ECGs available    From medical history (no echo report available):  Left ventricular size is normal, mild concentric LVH, EF 55 - 60% Diastolic filling pattern indicates impaired relaxation, left atrium mildly dilated, mild AV sclerosis without stenosis, mild mitral regurgitation, mild tricuspid regurgitation, mild to moderate pulmonary hypertension, no pericardial effusion.      Neuro/Psych:   (+) neuromuscular disease (Neuropathy):,    (-) seizures, TIA and CVA            ROS comment: Chronic fatigue  Macular degeneration GI/Hepatic/Renal:   (+) GERD ([facility]): no interval change,      (-) hepatitis and no renal disease      ROS comment: Esophageal stenosis with dysphagia  Small bowel obstruction  Surgical asplenia  Hemorrhoids s/p surgical repair. Endo/Other:    (+) : arthritis (CLBP with L sciatica managed with Baclofen): OA., malignancy/cancer (Colon adenocarcinoma). (-) diabetes mellitus, blood dyscrasia        Pt had no PAT visit       Abdominal:       Abdomen: tender. Vascular:     - DVT and PE.       ROS comment: Enlarged aorta. Other Findings: NG to suction with bloody serosanguinous fluid          Anesthesia Plan      general     ASA 3     (Modified RSI with HOB at 30 degrees   Pre-oxygenation x 3 minutes  20mg Ketamine  PONV prophylaxis)  Induction: intravenous. MIPS: Postoperative opioids intended and Prophylactic antiemetics administered. Anesthetic plan and risks discussed with patient. Plan discussed with CRNA.                     Jessica Dozier DO   11/13/2022

## 2022-11-13 NOTE — OP NOTE
Operative Note      Patient: Naomy Melendez  YOB: 1950  MRN: [id number]    Date of Procedure: 11/13/2022    Pre-Op Diagnosis: SMALL BOWEL OBSTRUCTION    Post-Op Diagnosis: Same       Procedure:   Diagnostic laparoscopy with laparoscopic lysis of adhesions extensive adhesions  Conversion to exploratory laparotomy with small bowel resection    Surgeon(s):  Marisela Welch MD    Assistant:   * No surgical staff found *    Anesthesia: General    Estimated Blood Loss (mL): less than 50     Complications: None    Specimens:   ID Type Source Tests Collected by Time Destination   A : SMALL BOWEL  Tissue Colon SURGICAL PATHOLOGY Marisela Welch MD 11/13/2022 1033        Implants:  * No implants in log *      Drains:   NG/OG/NJ/NE Tube Nasogastric 16 fr Left nostril (Active)   Surrounding Skin Clean, dry & intact 11/13/22 1147   Securement device Tape 11/13/22 1147   Status Suction-low intermittent 11/13/22 1147   Placement Verified X-Ray (Initial) 11/12/22 2000   NG/OG/NJ/NE External Measurement (cm) 58 cm 11/13/22 1147   Drainage Appearance Brown 11/13/22 1147   Output (mL) 750 ml 11/13/22 0808       Urinary Catheter 11/13/22 [place]yer (Active)   Catheter Indications Perioperative use for selected surgical procedures 11/13/22 1147   Urine Color Yellow 11/13/22 1147   Urine Appearance Clear 11/13/22 1147       Findings: Extensive intra-abdominal adhesions with friable small bowel    Detailed Description of Procedure: The patient was identified and the procedure was confirmed. He was taken to the operating room and laid supine on the operating room table. He underwent general anesthesia by the anesthesia team and was intubated. His abdomen was then clipped, prepped, draped in a sterile fashion. A 5 mm incision was made at Infante's point in the left upper quadrant. A Veress needle was passed into the abdominal cavity which was insufflated to 15 mmHg.   An Optiview trocar was then passed into the abdominal cavity under direct visualization. Initial evaluation of the abdominal cavity revealed extensive adhesions of the omentum to the anterior abdominal wall as well as multiple small bowel adhesions to the anterior abdominal wall in the midline and primarily in the right lower quadrant of the abdomen. Another 5 mm trocar was placed in the left mid abdomen. Scissors were then used to take down adhesions from the midline where a third 5 mm trocar was placed. Using these 3 trochars, we proceeded to perform extensive lysis of adhesions. The small bowel was matted to an incision in the right lower quadrant anterior abdominal wall. Careful dissection was undertaken until the small bowel was released from this area. This revealed further adhesions in the right lower quadrant and pelvis which were taken down using a combination of sharp dissection with cold scissors and the LigaSure device. I then was able to localize the cecum and terminal ileum. I started running the small bowel proximally noting a Meckel's diverticulum in the distal small bowel. Just distal to that was an area of the small bowel that was damaged during our laparoscopic lysis of adhesions. I elected to remove the segment of small bowel. As I ran the small bowel proximally, there was another area with a closed-loop small bowel obstruction from an adhesive band. This band was lysed however the small bowel was so friable in this area that there was an enterotomy created in the fair amount of succus spilled. At this point, I elected to convert to a laparotomy. An approximately 10 cm incision was made in the periumbilical region. Dissection was carried down through subcutaneous tissues outside of fascia which was incised. The abdominal cavity was then entered and the small bowel was eviscerated. The area of enterotomy was clamped with Martin clamps.   I then proceeded to perform further lysis of adhesions using my blunt fingertips down the pelvis to release the entire small bowel. There was a clear transition point in the area where the closed-loop obstruction was noted. The small bowel was very compromised and was within 10 cm of the Meckel's diverticulum and the previously noted damage small bowel. I then performed an en bloc resection of the small bowel using a LORENE 75 stapler. A side-to-side anastomosis was then created with a LORENE 75 stapler and the common enterotomy closed with another fire of the stapler. Of note, the small bowel mesentery was divided with the LigaSure device. The mesenteric defect was closed with a running 3-0 silk suture. A silk stitch was placed at the crotch. I then irrigated the abdomen with copious amounts of warm saline until the aspirate returned clear. I again ran the small bowel from the terminal ileum to the ligament of Treitz without any other abnormality noted. I then proceeded to check the stomach for the patient's NG tube which had put out over 400 cc of dark bilious material throughout the procedure. The abdomen was then closed at this point. A running looped PDS suture was run from the top and bottom of the incision and tied in the middle. The 5 mm trochars were removed and the skin was reapproximated there with 4 Monocryl suture. The midline skin was reapproximated with a running 3-0 Vicryl suture. Skin glue was applied to all the incisions. The patient tolerated the procedure well. There were no complications. He was extubated from anesthesia and transferred to the postanesthesia care unit in stable condition. All needle, sponge, instrument counts were correct.     Electronically signed by Yumiko Conti MD on 11/13/2022 at 12:43 PM

## 2022-11-13 NOTE — PROGRESS NOTES
Admitting Date and Time: 11/11/2022 11:00 AM  Admit Dx: SBO (small bowel obstruction) ([facility] [K56.609]  Intestinal obstruction, unspecified cause, unspecified whether partial or complete ([facility] [K56.609]    Subjective:    AB PAIN post op cotnrolled    ROS: denies fever, chills, cp, sob, n/v, HA unless stated above.      Arformoterol Tartrate  15 mcg Nebulization BID    And    ipratropium  0.5 mg Nebulization 4x daily    oxymetazoline  2 spray Each Nostril Once    lidocaine   Topical Once    sodium chloride flush  10 mL IntraVENous 2 times per day    enoxaparin  40 mg SubCUTAneous Daily    pantoprazole (PROTONIX) 40 mg injection  40 mg IntraVENous Daily     sodium chloride flush, 10 mL, PRN  sodium chloride, , PRN  ondansetron, 4 mg, Q8H PRN   Or  ondansetron, 4 mg, Q6H PRN  morphine, 2 mg, Q2H PRN   Or  morphine, 4 mg, Q2H PRN  albuterol sulfate HFA, 2 puff, Q6H PRN  labetalol, 10 mg, Q30 Min PRN         Objective:    BP (!) 142/81   Pulse 63   Temp 97.7 °F (36.5 °C) (Oral)   Resp 16   Ht 6' (1.829 m)   Wt 172 lb (78 kg)   SpO2 94%   BMI 23.33 kg/m²   General Appearance: alert and oriented to person, place and time and in no acute distress  Skin: warm and dry  Head: normocephalic and atraumatic  Eyes: pupils equal, round, and reactive to light, extraocular eye movements intact, conjunctivae normal  Neck: neck supple and non tender without mass   Pulmonary/Chest: clear to auscultation bilaterally- no wheezes, rales or rhonchi, normal air movement, no respiratory distress  Cardiovascular: normal rate, normal S1 and S2 and no carotid bruits  Abdomen: soft, non-tender, non-distended, normal bowel sounds, no masses or organomegaly  Extremities: no cyanosis, no clubbing and no  edema  Neurologic: no cranial nerve deficit and speech normal      Recent Labs     11/11/22  1135 11/12/22  0341 11/13/22  0401    135 137   K 4.7 4.2 4.3    99 99   CO2 25 26 26   BUN 17 17 22   CREATININE 0.9 0.8 0.9   GLUCOSE 110* 108* 113*   CALCIUM 9.7 9.1 9.5       Recent Labs     11/11/22  1135 11/12/22  0341 11/13/22  0401   ALKPHOS 86 80 91   PROT 7.0 5.9* 6.5   LABALBU 4.3 3.7 4.0   BILITOT 0.3 0.4 0.5   AST 15 13 21   ALT 15 12 13       Recent Labs     11/11/22  1135 11/12/22  0341 11/13/22  0401   WBC 14.2* 12.8* 4.2*   RBC 4.81 4.78 5.03   HGB 14.7 14.6 15.2   HCT 44.5 43.1 45.8   MCV 92.5 90.2 91.1   MCH 30.6 30.5 30.2   MCHC 33.0 33.9 33.2   RDW 14.1 14.1 14.2    327 357   MPV 9.3 9.4 9.8           Radiology:   CT ABDOMEN PELVIS WO CONTRAST Additional Contrast? Oral   Final Result   1. Worsening findings compatible with mid to distal small bowel obstruction. The terminal ileum and left colon are decompressed. 2.  Increased ascites, no free air or abscess. 3.  Mild-to-moderate periaortic and aortocaval retroperitoneal   lymphadenopathy. Multiple prominent lymph nodes in the small bowel   mesentery. The lymph nodes may be reactive in nature or related to   lymphoproliferative disorder or less likely metastatic disease. XR ABDOMEN FOR NG/OG/NE TUBE PLACEMENT   Final Result   1. Catheter is in the proximal stomach as described, side hole around the   diaphragm level/esophageal hiatus   2. Bowel dilation         CT ABDOMEN PELVIS W IV CONTRAST Additional Contrast? None   Final Result   Fluid-filled dilated and air-filled loops of mid small bowel with   decompression both distally and normal appearance proximally. There is   fecalization of the terminal ileum. Findings suggest partial obstruction. Transition point is seen in the lower abdomen. The appendix is not well   visualized however no inflammatory changes in the right lower quadrant. Stool scattered throughout the colon. Unexplained mildly enlarged lymph nodes seen throughout the retroperitoneum   and pelvic sidewalls. Continued follow-up is recommended for stability.          XR ABDOMEN (KUB) (SINGLE AP VIEW)    (Results Pending) Assessment:  Principal Problem:    SBO (small bowel obstruction) (HCC)  Resolved Problems:    * No resolved hospital problems. *      Plan: History of present illness from History and Physical:  67 y.o. male with a history of cad, copd, colon cancer: adenocarcinoma, gerd, htn, hld, presents with ab pain that began last night. H modifies hx to include that he had usual about of bm, 3-4 qd on a every other day basis. This was associated with distension and nausea. Recent evaluation by [facility] for esophageal stricture. 3/22: last colonoscopy and EGD  CT imaging was reviewed consistent with partial obstruction. Repeat CT AB w/o contrast on 11/12 at 12:22:   1. Worsening findings compatible with mid to distal small bowel obstruction. The terminal ileum and left colon are decompressed. 2.  Increased ascites, no free air or abscess. 3.  Mild-to-moderate periaortic and aortocaval retroperitoneal   lymphadenopathy. Multiple prominent lymph nodes in the small bowel   mesentery. The lymph nodes may be reactive in nature or related to   lymphoproliferative disorder or less likely metastatic disease. Sbo: npo diet and management per primary surgical service  Taken to OR am of 11/13. Saw post op doing well. Not passing gas yet. Procedure: Diagnostic laparoscopy with laparoscopic lysis of adhesions extensive adhesions. Conversion to exploratory laparotomy with small bowel resection     Comitted smoker: he bleleives smoking is good for him despite trying to give him further facts. He agreed to take my adivce nicely but further discussion would seem to offer no further benefit due to his belief systems. 11/13 poa friend visiting. They confirm he is a committed smoker who doesn't like discussing smoking  Htn:holding po meds due to #1.   Iv prn bb  Copd O2 if needed, inhalers  Bello Coulter iv ppi  Jim RN to find out if patient is supposed to be on CPAP hs for sleep apnea then continue home settings qHS.    Patient and poa says on 11/13 that he is complaint at home. I asked poa to bring in machine tonight. She agrees to bring it in tomorrow and call tonight  CAD and valvular heart disease: monitor closely until back on po meds. Iv bb prn  Full code  Dvt prophy lovenox  Dispo: discharge to his own home when ready    NOTE: This report was transcribed using voice recognition software. Every effort was made to ensure accuracy; however, inadvertent computerized transcription errors may be present.      Electronically signed by Armando Clemens MD on 11/13/2022 at 8:27 AM

## 2022-11-13 NOTE — INTERVAL H&P NOTE
Update History & Physical    The patient's History and Physical of November 12, 2022 was reviewed with the patient and I examined the patient. There was a change as follows. Patient has had persistent abdominal pain with worsening CT scan showing more free fluid in the abdomen without progression of contrast.  He is had persistent obstipation. NG tube output has increased. We will proceed with surgical exploration. . The surgical site was confirmed by the patient and me. Plan: The risks, benefits, expected outcome, and alternative to the recommended procedure have been discussed with the patient. Patient understands and wants to proceed with the procedure.      Electronically signed by Cirilo Oneil MD on 11/13/2022 at 9:26 AM

## 2022-11-14 LAB
ALBUMIN SERPL-MCNC: 2.9 G/DL (ref 3.5–5.2)
ALP BLD-CCNC: 63 U/L (ref 40–129)
ALT SERPL-CCNC: 13 U/L (ref 0–40)
ANION GAP SERPL CALCULATED.3IONS-SCNC: 14 MMOL/L (ref 7–16)
AST SERPL-CCNC: 17 U/L (ref 0–39)
BASOPHILS ABSOLUTE: 0 E9/L (ref 0–0.2)
BASOPHILS RELATIVE PERCENT: 0 % (ref 0–2)
BILIRUB SERPL-MCNC: 0.6 MG/DL (ref 0–1.2)
BUN BLDV-MCNC: 44 MG/DL (ref 6–23)
BURR CELLS: ABNORMAL
CALCIUM SERPL-MCNC: 8.3 MG/DL (ref 8.6–10.2)
CHLORIDE BLD-SCNC: 101 MMOL/L (ref 98–107)
CO2: 25 MMOL/L (ref 22–29)
CREAT SERPL-MCNC: 2.5 MG/DL (ref 0.7–1.2)
EOSINOPHILS ABSOLUTE: 0 E9/L (ref 0.05–0.5)
EOSINOPHILS RELATIVE PERCENT: 0 % (ref 0–6)
GFR SERPL CREATININE-BSD FRML MDRD: 27 ML/MIN/1.73
GLUCOSE BLD-MCNC: 96 MG/DL (ref 74–99)
HCT VFR BLD CALC: 42 % (ref 37–54)
HEMOGLOBIN: 14 G/DL (ref 12.5–16.5)
LYMPHOCYTES ABSOLUTE: 1.68 E9/L (ref 1.5–4)
LYMPHOCYTES RELATIVE PERCENT: 13 % (ref 20–42)
MCH RBC QN AUTO: 31 PG (ref 26–35)
MCHC RBC AUTO-ENTMCNC: 33.3 % (ref 32–34.5)
MCV RBC AUTO: 92.9 FL (ref 80–99.9)
METAMYELOCYTES RELATIVE PERCENT: 1 % (ref 0–1)
MONOCYTES ABSOLUTE: 0.52 E9/L (ref 0.1–0.95)
MONOCYTES RELATIVE PERCENT: 4 % (ref 2–12)
MYELOCYTE PERCENT: 3 % (ref 0–0)
NEUTROPHILS ABSOLUTE: 10.71 E9/L (ref 1.8–7.3)
NEUTROPHILS RELATIVE PERCENT: 79 % (ref 43–80)
PDW BLD-RTO: 14.5 FL (ref 11.5–15)
PLATELET # BLD: 286 E9/L (ref 130–450)
PMV BLD AUTO: 9.9 FL (ref 7–12)
POIKILOCYTES: ABNORMAL
POTASSIUM REFLEX MAGNESIUM: 4.7 MMOL/L (ref 3.5–5)
RBC # BLD: 4.52 E12/L (ref 3.8–5.8)
SODIUM BLD-SCNC: 140 MMOL/L (ref 132–146)
TOTAL PROTEIN: 5.2 G/DL (ref 6.4–8.3)
VACUOLATED NEUTROPHILS: ABNORMAL
WBC # BLD: 12.9 E9/L (ref 4.5–11.5)

## 2022-11-14 PROCEDURE — 6360000002 HC RX W HCPCS: Performed by: SURGERY

## 2022-11-14 PROCEDURE — 85025 COMPLETE CBC W/AUTO DIFF WBC: CPT

## 2022-11-14 PROCEDURE — 2580000003 HC RX 258: Performed by: SURGERY

## 2022-11-14 PROCEDURE — 2580000003 HC RX 258: Performed by: STUDENT IN AN ORGANIZED HEALTH CARE EDUCATION/TRAINING PROGRAM

## 2022-11-14 PROCEDURE — 6360000002 HC RX W HCPCS: Performed by: INTERNAL MEDICINE

## 2022-11-14 PROCEDURE — 94640 AIRWAY INHALATION TREATMENT: CPT

## 2022-11-14 PROCEDURE — 2500000003 HC RX 250 WO HCPCS: Performed by: SURGERY

## 2022-11-14 PROCEDURE — 6360000002 HC RX W HCPCS: Performed by: STUDENT IN AN ORGANIZED HEALTH CARE EDUCATION/TRAINING PROGRAM

## 2022-11-14 PROCEDURE — 80053 COMPREHEN METABOLIC PANEL: CPT

## 2022-11-14 PROCEDURE — C9113 INJ PANTOPRAZOLE SODIUM, VIA: HCPCS | Performed by: STUDENT IN AN ORGANIZED HEALTH CARE EDUCATION/TRAINING PROGRAM

## 2022-11-14 PROCEDURE — 2700000000 HC OXYGEN THERAPY PER DAY

## 2022-11-14 PROCEDURE — 36415 COLL VENOUS BLD VENIPUNCTURE: CPT

## 2022-11-14 PROCEDURE — 99233 SBSQ HOSP IP/OBS HIGH 50: CPT | Performed by: INTERNAL MEDICINE

## 2022-11-14 PROCEDURE — 1200000000 HC SEMI PRIVATE

## 2022-11-14 RX ORDER — METRONIDAZOLE 500 MG/100ML
500 INJECTION, SOLUTION INTRAVENOUS EVERY 8 HOURS
Status: DISCONTINUED | OUTPATIENT
Start: 2022-11-14 | End: 2022-11-18

## 2022-11-14 RX ORDER — ENOXAPARIN SODIUM 100 MG/ML
30 INJECTION SUBCUTANEOUS DAILY
Status: DISCONTINUED | OUTPATIENT
Start: 2022-11-15 | End: 2022-11-15

## 2022-11-14 RX ORDER — SODIUM CHLORIDE, SODIUM LACTATE, POTASSIUM CHLORIDE, AND CALCIUM CHLORIDE .6; .31; .03; .02 G/100ML; G/100ML; G/100ML; G/100ML
1000 INJECTION, SOLUTION INTRAVENOUS ONCE
Status: COMPLETED | OUTPATIENT
Start: 2022-11-14 | End: 2022-11-14

## 2022-11-14 RX ADMIN — MORPHINE SULFATE 2 MG: 2 INJECTION, SOLUTION INTRAMUSCULAR; INTRAVENOUS at 02:59

## 2022-11-14 RX ADMIN — ENOXAPARIN SODIUM 40 MG: 100 INJECTION SUBCUTANEOUS at 09:03

## 2022-11-14 RX ADMIN — SODIUM CHLORIDE, POTASSIUM CHLORIDE, SODIUM LACTATE AND CALCIUM CHLORIDE 1000 ML: 600; 310; 30; 20 INJECTION, SOLUTION INTRAVENOUS at 05:18

## 2022-11-14 RX ADMIN — WATER 1000 MG: 1 INJECTION INTRAMUSCULAR; INTRAVENOUS; SUBCUTANEOUS at 09:39

## 2022-11-14 RX ADMIN — IPRATROPIUM BROMIDE 0.5 MG: 0.5 SOLUTION RESPIRATORY (INHALATION) at 06:43

## 2022-11-14 RX ADMIN — MORPHINE SULFATE 4 MG: 4 INJECTION, SOLUTION INTRAMUSCULAR; INTRAVENOUS at 09:07

## 2022-11-14 RX ADMIN — MORPHINE SULFATE 4 MG: 4 INJECTION, SOLUTION INTRAMUSCULAR; INTRAVENOUS at 18:16

## 2022-11-14 RX ADMIN — METRONIDAZOLE 500 MG: 500 INJECTION, SOLUTION INTRAVENOUS at 09:45

## 2022-11-14 RX ADMIN — IPRATROPIUM BROMIDE 0.5 MG: 0.5 SOLUTION RESPIRATORY (INHALATION) at 10:04

## 2022-11-14 RX ADMIN — ARFORMOTEROL TARTRATE 15 MCG: 15 SOLUTION RESPIRATORY (INHALATION) at 06:43

## 2022-11-14 RX ADMIN — SODIUM CHLORIDE, PRESERVATIVE FREE 40 MG: 5 INJECTION INTRAVENOUS at 09:01

## 2022-11-14 RX ADMIN — SODIUM CHLORIDE, POTASSIUM CHLORIDE, SODIUM LACTATE AND CALCIUM CHLORIDE: 600; 310; 30; 20 INJECTION, SOLUTION INTRAVENOUS at 12:19

## 2022-11-14 RX ADMIN — IPRATROPIUM BROMIDE 0.5 MG: 0.5 SOLUTION RESPIRATORY (INHALATION) at 14:05

## 2022-11-14 RX ADMIN — METRONIDAZOLE 500 MG: 500 INJECTION, SOLUTION INTRAVENOUS at 18:14

## 2022-11-14 RX ADMIN — SODIUM CHLORIDE, POTASSIUM CHLORIDE, SODIUM LACTATE AND CALCIUM CHLORIDE: 600; 310; 30; 20 INJECTION, SOLUTION INTRAVENOUS at 20:19

## 2022-11-14 ASSESSMENT — PAIN SCALES - GENERAL
PAINLEVEL_OUTOF10: 5
PAINLEVEL_OUTOF10: 3
PAINLEVEL_OUTOF10: 4
PAINLEVEL_OUTOF10: 2
PAINLEVEL_OUTOF10: 7
PAINLEVEL_OUTOF10: 9

## 2022-11-14 ASSESSMENT — PAIN DESCRIPTION - ORIENTATION
ORIENTATION: RIGHT;LEFT;LOWER
ORIENTATION: RIGHT;LEFT;LOWER

## 2022-11-14 ASSESSMENT — PAIN DESCRIPTION - LOCATION
LOCATION: ABDOMEN
LOCATION: ABDOMEN

## 2022-11-14 ASSESSMENT — PAIN DESCRIPTION - DESCRIPTORS: DESCRIPTORS: ACHING;STABBING

## 2022-11-14 NOTE — PROGRESS NOTES
GENERAL SURGERY  DAILY PROGRESS NOTE  11/14/2022    Chief Complaint   Patient presents with    Abdominal Pain     RT MID FLANK STARTED NAUSEA WITH DRY HEAVES/ DENIES DIARRHEA       Subjective:  Stop day 1 from lap converted to open diagnostic laparoscopy with small bowel resection. Patient doing well this morning. Pain well controlled. Midline incision is dry clean and intact. Denies gas or bowel movements. But has been ambulatory. Objective:  BP (!) 90/59   Pulse 86   Temp 98 °F (36.7 °C) (Oral)   Resp 16   Ht 6' (1.829 m)   Wt 172 lb (78 kg)   SpO2 94%   BMI 23.33 kg/m²     GENERAL:  Laying in bed, awake, alert, cooperative, no apparent distress  HEAD: Normocephalic  EYES: No sclera icterus, pupils equal  LUNGS:  No increased work of breathing  CARDIOVASCULAR:  RR  ABDOMEN:  Soft, appropriately tender, mildly distended. Incision is dry clean and intact  EXTREMITIES: No edema or swelling  SKIN: Warm and dry    Assessment/Plan:  67 y.o. male with small bowel obstruction s/p diagnostic lap converted to open with small bowel resection on 11/13    Postop day 1  Keep Dyer  Monitor urine output  Will increase IV fluids 150 cc an hour  Continue pain control   Will continue antibiotics--Rocephin and Flagyl    Electronically signed by Tasneem Meek DO on 11/14/2022 at 7:50 AM     As above.   Increase IV fluids  Monitor creatinine, continue Dyer for adequate monitoring of urine output  Continue antibiotics  Await return of bowel function

## 2022-11-14 NOTE — ACP (ADVANCE CARE PLANNING)
Advance Care Planning   Healthcare Decision Maker:    Primary Decision Maker: Aylin Carreno [facility] - [phone]

## 2022-11-14 NOTE — PROGRESS NOTES
Department of Internal Medicine  General Internal Medicine  Attending Progress Note  Chief Complaint   Patient presents with    Abdominal Pain     RT MID FLANK STARTED NAUSEA WITH DRY HEAVES/ DENIES DIARRHEA     SUBJECTIVE:    Reports that he is feeling better. Denied fever and chills. No chest pain. No nausea or vomiting. No diarrhea.     OBJECTIVE      Medications    Current Facility-Administered Medications: cefTRIAXone (ROCEPHIN) 1,000 mg in sterile water 10 mL IV syringe, 1,000 mg, IntraVENous, Q24H  metronidazole (FLAGYL) 500 mg in 0.9% NaCl 100 mL IVPB premix, 500 mg, IntraVENous, Q8H  Arformoterol Tartrate (BROVANA) nebulizer solution 15 mcg, 15 mcg, Nebulization, BID **AND** ipratropium (ATROVENT) 0.02 % nebulizer solution 0.5 mg, 0.5 mg, Nebulization, 4x daily  oxymetazoline (AFRIN) 0.05 % nasal spray 2 spray, 2 spray, Each Nostril, Once  lidocaine (XYLOCAINE) 2 % jelly, , Topical, Once  sodium chloride flush 0.9 % injection 10 mL, 10 mL, IntraVENous, 2 times per day  sodium chloride flush 0.9 % injection 10 mL, 10 mL, IntraVENous, PRN  0.9 % sodium chloride infusion, , IntraVENous, PRN  ondansetron (ZOFRAN-ODT) disintegrating tablet 4 mg, 4 mg, Oral, Q8H PRN **OR** ondansetron (ZOFRAN) injection 4 mg, 4 mg, IntraVENous, Q6H PRN  enoxaparin (LOVENOX) injection 40 mg, 40 mg, SubCUTAneous, Daily  lactated ringers infusion, , IntraVENous, Continuous  morphine (PF) injection 2 mg, 2 mg, IntraVENous, Q2H PRN **OR** morphine injection 4 mg, 4 mg, IntraVENous, Q2H PRN  albuterol sulfate HFA (PROVENTIL;VENTOLIN;PROAIR) 108 (90 Base) MCG/ACT inhaler 2 puff, 2 puff, Inhalation, Q6H PRN  pantoprazole (PROTONIX) 40 mg in sodium chloride (PF) 0.9 % 10 mL injection, 40 mg, IntraVENous, Daily  labetalol (NORMODYNE;TRANDATE) injection 10 mg, 10 mg, IntraVENous, Q30 Min PRN  Physical    VITALS:  BP (!) 90/59   Pulse 85   Temp 98.2 °F (36.8 °C) (Oral)   Resp 18   Ht 6' (1.829 m)   Wt 172 lb (78 kg)   SpO2 93%   BMI 23.33 kg/m²   CONSTITUTIONAL:  awake, cooperative, and no apparent distress  EYES:  extra-ocular muscles intact and vision intact  ENT:  normocepalic, without obvious abnormality  NECK:  supple, symmetrical, trachea midline  LUNGS:  no increased work of breathing, no retractions, and no crackles or wheezing  CARDIOVASCULAR:  normal apical pulses and normal S1 and S2  ABDOMEN:  normal bowel sounds, non-distended, and non-tender  MUSCULOSKELETAL:  full range of motion noted  NEUROLOGIC:  Mental Status Exam:  Level of Alertness:   awake  Orientation:   person, place, time  SKIN:  no bruising or bleeding  Data    CBC:   Lab Results   Component Value Date/Time    WBC 12.9 11/14/2022 05:02 AM    RBC 4.52 11/14/2022 05:02 AM    HGB 14.0 11/14/2022 05:02 AM    HCT 42.0 11/14/2022 05:02 AM    MCV 92.9 11/14/2022 05:02 AM    MCH 31.0 11/14/2022 05:02 AM    MCHC 33.3 11/14/2022 05:02 AM    RDW 14.5 11/14/2022 05:02 AM     11/14/2022 05:02 AM    MPV 9.9 11/14/2022 05:02 AM     BMP:    Lab Results   Component Value Date/Time     11/14/2022 05:02 AM    K 4.7 11/14/2022 05:02 AM     11/14/2022 05:02 AM    CO2 25 11/14/2022 05:02 AM    BUN 44 11/14/2022 05:02 AM    LABALBU 2.9 11/14/2022 05:02 AM    CREATININE 2.5 11/14/2022 05:02 AM    CALCIUM 8.3 11/14/2022 05:02 AM    GFRAA >60 03/30/2022 09:22 AM    LABGLOM 27 11/14/2022 05:02 AM    GLUCOSE 96 11/14/2022 05:02 AM       ASSESSMENT AND PLAN      SBO (small bowel obstruction): management per Surgery. S/p lap cristopher converted to open small bowel resection. Post op day 1. Pain control. NG tube still in nose. Continue to monitor. Minimal out put noted on suction  Acute Kidney Injury: continue IV fluid. Monitor renal function. Suspects that hypotension may contribute to renal injury. Monitor Intake and output. Aware of increase in IV fluid rate  Hypotension: may be related to volume depletion from NG suction. Continue Iv fluid. Monitor BP.    Acute respiratory failure with hypoxia: patient denied home oxygen use. Continue current supplemental oxygen. Continue to monitor. Wean off or increase as needed. Will order incentive spirometry for patient. Continue to monitor. Hx of tobacco Abuse: Nicotine patch if needed  Hypertension: hold meds due to low Bps  GERD: on PPI  Hx of COPD; not in exacerbation. Continue home breathing treatment.    Hx of BUZZ: continue CPAP  Hx of CAD: continue secondary Prophylaxis

## 2022-11-14 NOTE — ACP (ADVANCE CARE PLANNING)
11-14-Cm note: met with pt for transition of care, pt lives alone, his friend/POA Jesus is at the bedside , pt is independent, no DME, pt drives. He has NG to [facility], he denies any needs for homegoing, Jesus will transport him at [place]. Cm will follow .  Electronically signed by Amisha Man RN on 11/14/2022 at 3:53 PM

## 2022-11-14 NOTE — PROGRESS NOTES
Pharmacist Review and Automatic Dose Adjustment of Prophylactic Enoxaparin    The reviewing pharmacist has made an adjustment to the ordered enoxaparin dose or converted to UFH per the approved [facility] protocol and table as identified below. Hossein Stokes is a 67 y.o. male. Recent Labs     11/12/22  0341 11/13/22  0401 11/14/22  0502   CREATININE 0.8 0.9 2.5*       Estimated Creatinine Clearance: 29 mL/min (A) (based on SCr of 2.5 mg/dL (H)). Recent Labs     11/13/22  0401 11/14/22  0502   HGB 15.2 14.0   HCT 45.8 42.0    286     No results for input(s): INR in the last 72 hours.     Height:   Ht Readings from Last 1 Encounters:   11/11/22 6' (1.829 m)     Weight:  Wt Readings from Last 1 Encounters:   11/11/22 172 lb (78 kg)               Plan: Based upon the patient's weight and renal function    Ordered: Enoxaparin 40mg Daily    Changed/converted to    New Order: Enoxaparin 30mg SUBQ Daily      Thank you,  Bg Danielson, PharmD.,11/14/2022 1:51 PM

## 2022-11-15 PROBLEM — K56.609 INTESTINAL OBSTRUCTION (HCC): Status: ACTIVE | Noted: 2022-11-15

## 2022-11-15 LAB
ALBUMIN SERPL-MCNC: 2.5 G/DL (ref 3.5–5.2)
ALP BLD-CCNC: 86 U/L (ref 40–129)
ALT SERPL-CCNC: 13 U/L (ref 0–40)
ANION GAP SERPL CALCULATED.3IONS-SCNC: 8 MMOL/L (ref 7–16)
AST SERPL-CCNC: 20 U/L (ref 0–39)
BASOPHILS ABSOLUTE: 0 E9/L (ref 0–0.2)
BASOPHILS RELATIVE PERCENT: 0.3 % (ref 0–2)
BILIRUB SERPL-MCNC: 0.4 MG/DL (ref 0–1.2)
BUN BLDV-MCNC: 52 MG/DL (ref 6–23)
BURR CELLS: ABNORMAL
CALCIUM SERPL-MCNC: 8.1 MG/DL (ref 8.6–10.2)
CHLORIDE BLD-SCNC: 104 MMOL/L (ref 98–107)
CO2: 28 MMOL/L (ref 22–29)
CREAT SERPL-MCNC: 1.7 MG/DL (ref 0.7–1.2)
EKG ATRIAL RATE: 187 BPM
EKG Q-T INTERVAL: 288 MS
EKG QRS DURATION: 94 MS
EKG QTC CALCULATION (BAZETT): 488 MS
EKG R AXIS: -97 DEGREES
EKG T AXIS: 56 DEGREES
EKG VENTRICULAR RATE: 173 BPM
EOSINOPHILS ABSOLUTE: 0 E9/L (ref 0.05–0.5)
EOSINOPHILS RELATIVE PERCENT: 0 % (ref 0–6)
GFR SERPL CREATININE-BSD FRML MDRD: 42 ML/MIN/1.73
GLUCOSE BLD-MCNC: 83 MG/DL (ref 74–99)
HCT VFR BLD CALC: 37.7 % (ref 37–54)
HEMOGLOBIN: 11.9 G/DL (ref 12.5–16.5)
LV EF: 60 %
LVEF MODALITY: NORMAL
LYMPHOCYTES ABSOLUTE: 0.92 E9/L (ref 1.5–4)
LYMPHOCYTES RELATIVE PERCENT: 8 % (ref 20–42)
MCH RBC QN AUTO: 29.8 PG (ref 26–35)
MCHC RBC AUTO-ENTMCNC: 31.6 % (ref 32–34.5)
MCV RBC AUTO: 94.3 FL (ref 80–99.9)
METER GLUCOSE: 85 MG/DL (ref 74–99)
MONOCYTES ABSOLUTE: 0.92 E9/L (ref 0.1–0.95)
MONOCYTES RELATIVE PERCENT: 8 % (ref 2–12)
NEUTROPHILS ABSOLUTE: 9.66 E9/L (ref 1.8–7.3)
NEUTROPHILS RELATIVE PERCENT: 84.1 % (ref 43–80)
PDW BLD-RTO: 14.4 FL (ref 11.5–15)
PLATELET # BLD: 277 E9/L (ref 130–450)
PMV BLD AUTO: 10.1 FL (ref 7–12)
POIKILOCYTES: ABNORMAL
POLYCHROMASIA: ABNORMAL
POTASSIUM REFLEX MAGNESIUM: 4.3 MMOL/L (ref 3.5–5)
RBC # BLD: 4 E12/L (ref 3.8–5.8)
SODIUM BLD-SCNC: 140 MMOL/L (ref 132–146)
TARGET CELLS: ABNORMAL
TOTAL PROTEIN: 5 G/DL (ref 6.4–8.3)
VACUOLATED NEUTROPHILS: ABNORMAL
WBC # BLD: 11.5 E9/L (ref 4.5–11.5)

## 2022-11-15 PROCEDURE — 93306 TTE W/DOPPLER COMPLETE: CPT

## 2022-11-15 PROCEDURE — 2700000000 HC OXYGEN THERAPY PER DAY

## 2022-11-15 PROCEDURE — 36415 COLL VENOUS BLD VENIPUNCTURE: CPT

## 2022-11-15 PROCEDURE — 2580000003 HC RX 258: Performed by: SURGERY

## 2022-11-15 PROCEDURE — 6360000002 HC RX W HCPCS: Performed by: SPECIALIST

## 2022-11-15 PROCEDURE — 6360000002 HC RX W HCPCS: Performed by: INTERNAL MEDICINE

## 2022-11-15 PROCEDURE — 2500000003 HC RX 250 WO HCPCS: Performed by: SURGERY

## 2022-11-15 PROCEDURE — 6370000000 HC RX 637 (ALT 250 FOR IP): Performed by: SURGERY

## 2022-11-15 PROCEDURE — 2580000003 HC RX 258: Performed by: STUDENT IN AN ORGANIZED HEALTH CARE EDUCATION/TRAINING PROGRAM

## 2022-11-15 PROCEDURE — 2580000003 HC RX 258: Performed by: SPECIALIST

## 2022-11-15 PROCEDURE — 2500000003 HC RX 250 WO HCPCS: Performed by: STUDENT IN AN ORGANIZED HEALTH CARE EDUCATION/TRAINING PROGRAM

## 2022-11-15 PROCEDURE — 82962 GLUCOSE BLOOD TEST: CPT

## 2022-11-15 PROCEDURE — 99232 SBSQ HOSP IP/OBS MODERATE 35: CPT | Performed by: INTERNAL MEDICINE

## 2022-11-15 PROCEDURE — C9113 INJ PANTOPRAZOLE SODIUM, VIA: HCPCS | Performed by: STUDENT IN AN ORGANIZED HEALTH CARE EDUCATION/TRAINING PROGRAM

## 2022-11-15 PROCEDURE — 93010 ELECTROCARDIOGRAM REPORT: CPT | Performed by: INTERNAL MEDICINE

## 2022-11-15 PROCEDURE — 2500000003 HC RX 250 WO HCPCS

## 2022-11-15 PROCEDURE — 6360000002 HC RX W HCPCS: Performed by: STUDENT IN AN ORGANIZED HEALTH CARE EDUCATION/TRAINING PROGRAM

## 2022-11-15 PROCEDURE — 6360000002 HC RX W HCPCS: Performed by: SURGERY

## 2022-11-15 PROCEDURE — 80053 COMPREHEN METABOLIC PANEL: CPT

## 2022-11-15 PROCEDURE — 99291 CRITICAL CARE FIRST HOUR: CPT | Performed by: STUDENT IN AN ORGANIZED HEALTH CARE EDUCATION/TRAINING PROGRAM

## 2022-11-15 PROCEDURE — 93005 ELECTROCARDIOGRAM TRACING: CPT | Performed by: STUDENT IN AN ORGANIZED HEALTH CARE EDUCATION/TRAINING PROGRAM

## 2022-11-15 PROCEDURE — 85025 COMPLETE CBC W/AUTO DIFF WBC: CPT

## 2022-11-15 PROCEDURE — A4216 STERILE WATER/SALINE, 10 ML: HCPCS | Performed by: STUDENT IN AN ORGANIZED HEALTH CARE EDUCATION/TRAINING PROGRAM

## 2022-11-15 PROCEDURE — 94640 AIRWAY INHALATION TREATMENT: CPT

## 2022-11-15 PROCEDURE — 2060000000 HC ICU INTERMEDIATE R&B

## 2022-11-15 PROCEDURE — 93005 ELECTROCARDIOGRAM TRACING: CPT | Performed by: SPECIALIST

## 2022-11-15 RX ORDER — METOPROLOL TARTRATE 5 MG/5ML
5 INJECTION INTRAVENOUS PRN
Status: DISCONTINUED | OUTPATIENT
Start: 2022-11-15 | End: 2022-11-18 | Stop reason: HOSPADM

## 2022-11-15 RX ORDER — DIGOXIN 0.25 MG/ML
500 INJECTION INTRAMUSCULAR; INTRAVENOUS ONCE
Status: COMPLETED | OUTPATIENT
Start: 2022-11-15 | End: 2022-11-15

## 2022-11-15 RX ORDER — METOPROLOL TARTRATE 5 MG/5ML
INJECTION INTRAVENOUS
Status: COMPLETED
Start: 2022-11-15 | End: 2022-11-15

## 2022-11-15 RX ORDER — ENOXAPARIN SODIUM 100 MG/ML
80 INJECTION SUBCUTANEOUS 2 TIMES DAILY
Status: DISCONTINUED | OUTPATIENT
Start: 2022-11-15 | End: 2022-11-18 | Stop reason: HOSPADM

## 2022-11-15 RX ORDER — METOPROLOL TARTRATE 5 MG/5ML
5 INJECTION INTRAVENOUS
Status: ACTIVE | OUTPATIENT
Start: 2022-11-15 | End: 2022-11-15

## 2022-11-15 RX ORDER — METOPROLOL TARTRATE 5 MG/5ML
INJECTION INTRAVENOUS
Status: DISPENSED
Start: 2022-11-15 | End: 2022-11-15

## 2022-11-15 RX ORDER — DIGOXIN 0.25 MG/ML
250 INJECTION INTRAMUSCULAR; INTRAVENOUS EVERY 6 HOURS
Status: DISPENSED | OUTPATIENT
Start: 2022-11-15 | End: 2022-11-15

## 2022-11-15 RX ADMIN — METRONIDAZOLE 500 MG: 500 INJECTION, SOLUTION INTRAVENOUS at 03:40

## 2022-11-15 RX ADMIN — WATER 1000 MG: 1 INJECTION INTRAMUSCULAR; INTRAVENOUS; SUBCUTANEOUS at 06:29

## 2022-11-15 RX ADMIN — ARFORMOTEROL TARTRATE 15 MCG: 15 SOLUTION RESPIRATORY (INHALATION) at 06:05

## 2022-11-15 RX ADMIN — METOPROLOL TARTRATE 5 MG: 5 INJECTION, SOLUTION INTRAVENOUS at 01:53

## 2022-11-15 RX ADMIN — SODIUM CHLORIDE, POTASSIUM CHLORIDE, SODIUM LACTATE AND CALCIUM CHLORIDE: 600; 310; 30; 20 INJECTION, SOLUTION INTRAVENOUS at 03:03

## 2022-11-15 RX ADMIN — Medication 10 ML: at 09:08

## 2022-11-15 RX ADMIN — Medication 10 ML: at 21:30

## 2022-11-15 RX ADMIN — DILTIAZEM HYDROCHLORIDE 2.5 MG/HR: 5 INJECTION INTRAVENOUS at 03:02

## 2022-11-15 RX ADMIN — Medication 1 LOZENGE: at 13:17

## 2022-11-15 RX ADMIN — DEXTROSE 1 MG/MIN: 5 SOLUTION INTRAVENOUS at 12:03

## 2022-11-15 RX ADMIN — METRONIDAZOLE 500 MG: 500 INJECTION, SOLUTION INTRAVENOUS at 10:22

## 2022-11-15 RX ADMIN — ENOXAPARIN SODIUM 80 MG: 100 INJECTION SUBCUTANEOUS at 20:16

## 2022-11-15 RX ADMIN — SODIUM CHLORIDE, POTASSIUM CHLORIDE, SODIUM LACTATE AND CALCIUM CHLORIDE: 600; 310; 30; 20 INJECTION, SOLUTION INTRAVENOUS at 10:06

## 2022-11-15 RX ADMIN — IPRATROPIUM BROMIDE 0.5 MG: 0.5 SOLUTION RESPIRATORY (INHALATION) at 06:05

## 2022-11-15 RX ADMIN — MORPHINE SULFATE 2 MG: 2 INJECTION, SOLUTION INTRAMUSCULAR; INTRAVENOUS at 20:16

## 2022-11-15 RX ADMIN — ENOXAPARIN SODIUM 80 MG: 100 INJECTION SUBCUTANEOUS at 08:55

## 2022-11-15 RX ADMIN — DEXTROSE MONOHYDRATE 150 MG: 50 INJECTION, SOLUTION INTRAVENOUS at 11:53

## 2022-11-15 RX ADMIN — DILTIAZEM HYDROCHLORIDE 5 MG/HR: 5 INJECTION INTRAVENOUS at 10:33

## 2022-11-15 RX ADMIN — DIGOXIN 250 MCG: 0.25 INJECTION INTRAMUSCULAR; INTRAVENOUS at 08:54

## 2022-11-15 RX ADMIN — DEXTROSE 0.5 MG/MIN: 5 SOLUTION INTRAVENOUS at 22:59

## 2022-11-15 RX ADMIN — SODIUM CHLORIDE, PRESERVATIVE FREE 40 MG: 5 INJECTION INTRAVENOUS at 08:54

## 2022-11-15 RX ADMIN — DIGOXIN 500 MCG: 0.25 INJECTION INTRAMUSCULAR; INTRAVENOUS at 02:29

## 2022-11-15 RX ADMIN — METRONIDAZOLE 500 MG: 500 INJECTION, SOLUTION INTRAVENOUS at 18:01

## 2022-11-15 RX ADMIN — SODIUM CHLORIDE, POTASSIUM CHLORIDE, SODIUM LACTATE AND CALCIUM CHLORIDE: 600; 310; 30; 20 INJECTION, SOLUTION INTRAVENOUS at 17:10

## 2022-11-15 ASSESSMENT — PAIN - FUNCTIONAL ASSESSMENT: PAIN_FUNCTIONAL_ASSESSMENT: ACTIVITIES ARE NOT PREVENTED

## 2022-11-15 ASSESSMENT — PAIN SCALES - GENERAL
PAINLEVEL_OUTOF10: 0
PAINLEVEL_OUTOF10: 2
PAINLEVEL_OUTOF10: 4
PAINLEVEL_OUTOF10: 0
PAINLEVEL_OUTOF10: 2
PAINLEVEL_OUTOF10: 4

## 2022-11-15 ASSESSMENT — PAIN DESCRIPTION - DESCRIPTORS
DESCRIPTORS: ACHING
DESCRIPTORS: ACHING

## 2022-11-15 ASSESSMENT — PAIN DESCRIPTION - LOCATION
LOCATION: THROAT
LOCATION: ABDOMEN

## 2022-11-15 ASSESSMENT — PAIN DESCRIPTION - ORIENTATION: ORIENTATION: MID

## 2022-11-15 NOTE — SIGNIFICANT EVENT
[address] [place], [place] [address]ist RRT/Code Blue Note    Subjective:    Called to bedside for an RRT because of A. fib with RVR. Patient states he has a history of A. Fib that was diagnosed several years ago but never required heart rate medications or anticoagulation. Patient's [id number] or is 2. During my encounter, patient's heart rate was in the 180s and 190s. Patient blood pressure was in the 85H systolic range. Patient was given a bolus of normal saline and given IV metoprolol 5 mg x 3 doses. Patient's heart rate remained in the high 140s after the metoprolol. Cardiology was paged out and the cardiologist on call agreed to give the patient digoxin 500 mcg as a one-time dose. And I am 250 mcg every 6 hours x2. Also the cardiologist recommended full dose anticoagulation with Lovenox. Patient was transferred to the 6 floor and put on Cardizem drip as well per cardiology.        metoprolol        metoprolol  5 mg IntraVENous NOW    digoxin  250 mcg IntraVENous Q6H    enoxaparin  80 mg SubCUTAneous BID    cefTRIAXone (ROCEPHIN) IV  1,000 mg IntraVENous Q24H    metroNIDAZOLE  500 mg IntraVENous Q8H    Arformoterol Tartrate  15 mcg Nebulization BID    And    ipratropium  0.5 mg Nebulization 4x daily    lidocaine   Topical Once    sodium chloride flush  10 mL IntraVENous 2 times per day    pantoprazole (PROTONIX) 40 mg injection  40 mg IntraVENous Daily     metoprolol, 5 mg, PRN  sodium chloride flush, 10 mL, PRN  sodium chloride, , PRN  ondansetron, 4 mg, Q8H PRN   Or  ondansetron, 4 mg, Q6H PRN  morphine, 2 mg, Q2H PRN   Or  morphine, 4 mg, Q2H PRN  albuterol sulfate HFA, 2 puff, Q6H PRN  labetalol, 10 mg, Q30 Min PRN         Objective:    BP (!) 100/57   Pulse (!) 146   Temp 98.5 °F (36.9 °C) (Oral)   Resp 18   Ht 6' (1.829 m)   Wt 172 lb (78 kg)   SpO2 94%   BMI 23.33 kg/m²   General Appearance: alert and oriented to person, place and time, well-developed and well-nourished, in no acute distress      Recent Labs     11/12/22  0341 11/13/22  0401 11/14/22  0502    137 140   K 4.2 4.3 4.7   CL 99 99 101   CO2 26 26 25   BUN 17 22 44*   CREATININE 0.8 0.9 2.5*   GLUCOSE 108* 113* 96   CALCIUM 9.1 9.5 8.3*       Recent Labs     11/12/22  0341 11/13/22  0401 11/14/22  0502   WBC 12.8* 4.2* 12.9*   RBC 4.78 5.03 4.52   HGB 14.6 15.2 14.0   HCT 43.1 45.8 42.0   MCV 90.2 91.1 92.9   MCH 30.5 30.2 31.0   MCHC 33.9 33.2 33.3   RDW 14.1 14.2 14.5    357 286   MPV 9.4 9.8 9.9       CBC:   Lab Results   Component Value Date/Time    WBC 12.9 11/14/2022 05:02 AM    RBC 4.52 11/14/2022 05:02 AM    HGB 14.0 11/14/2022 05:02 AM    HCT 42.0 11/14/2022 05:02 AM    MCV 92.9 11/14/2022 05:02 AM    MCH 31.0 11/14/2022 05:02 AM    MCHC 33.3 11/14/2022 05:02 AM    RDW 14.5 11/14/2022 05:02 AM     11/14/2022 05:02 AM    MPV 9.9 11/14/2022 05:02 AM     BMP:    Lab Results   Component Value Date/Time     11/14/2022 05:02 AM    K 4.7 11/14/2022 05:02 AM     11/14/2022 05:02 AM    CO2 25 11/14/2022 05:02 AM    BUN 44 11/14/2022 05:02 AM    LABALBU 2.9 11/14/2022 05:02 AM    CREATININE 2.5 11/14/2022 05:02 AM    CALCIUM 8.3 11/14/2022 05:02 AM    GFRAA >60 03/30/2022 09:22 AM    LABGLOM 27 11/14/2022 05:02 AM    GLUCOSE 96 11/14/2022 05:02 AM        I/O last 3 completed shifts: In: 1800 [I.V.:1800]  Out: 2800 [Urine:920; Emesis/NG output:1805; Blood:75]  I/O this shift: In: 0   Out: 1300 [Urine:850; Emesis/NG output:450]      Radiology:       Assessment:    Principal Problem:    SBO (small bowel obstruction) (HCC)  Resolved Problems:    * No resolved hospital problems. *      Plan:  A. fib with RVR-Per cardiology on-call: IV digoxin and Cardizem drip. Status post IV metoprolol 5 mg x 3. Lovenox 80 twice daily. Cardiology consultation. Critical care time 55 minutes not including procedures. NOTE: This report was transcribed using voice recognition software. Every effort was made to ensure accuracy; however, inadvertent computerized transcription errors may be present.      Electronically signed by Essie Oneal MD on 11/15/2022 at 2:48 AM

## 2022-11-15 NOTE — PROGRESS NOTES
Department of Internal Medicine  General Internal Medicine  Attending Progress Note  Chief Complaint   Patient presents with    Abdominal Pain     RT MID FLANK STARTED NAUSEA WITH DRY HEAVES/ DENIES DIARRHEA     SUBJECTIVE:    Patient was seen and examined. He denied fever and chills. He is aware of his rapid heart rate ( a. Fib).  He is also aware of plans to continue     OBJECTIVE      Medications    Current Facility-Administered Medications: metoprolol (LOPRESSOR) 5 MG/5ML injection, , ,   metoprolol (LOPRESSOR) injection 5 mg, 5 mg, IntraVENous, NOW  metoprolol (LOPRESSOR) injection 5 mg, 5 mg, IntraVENous, PRN  digoxin (LANOXIN) injection 250 mcg, 250 mcg, IntraVENous, Q6H  dilTIAZem 125 mg in dextrose 5 % 125 mL infusion, 2.5-15 mg/hr, IntraVENous, Continuous  enoxaparin (LOVENOX) injection 80 mg, 80 mg, SubCUTAneous, BID  cefTRIAXone (ROCEPHIN) 1,000 mg in sterile water 10 mL IV syringe, 1,000 mg, IntraVENous, Q24H  metronidazole (FLAGYL) 500 mg in 0.9% NaCl 100 mL IVPB premix, 500 mg, IntraVENous, Q8H  Arformoterol Tartrate (BROVANA) nebulizer solution 15 mcg, 15 mcg, Nebulization, BID **AND** ipratropium (ATROVENT) 0.02 % nebulizer solution 0.5 mg, 0.5 mg, Nebulization, 4x daily  lidocaine (XYLOCAINE) 2 % jelly, , Topical, Once  sodium chloride flush 0.9 % injection 10 mL, 10 mL, IntraVENous, 2 times per day  sodium chloride flush 0.9 % injection 10 mL, 10 mL, IntraVENous, PRN  0.9 % sodium chloride infusion, , IntraVENous, PRN  ondansetron (ZOFRAN-ODT) disintegrating tablet 4 mg, 4 mg, Oral, Q8H PRN **OR** ondansetron (ZOFRAN) injection 4 mg, 4 mg, IntraVENous, Q6H PRN  lactated ringers infusion, , IntraVENous, Continuous  morphine (PF) injection 2 mg, 2 mg, IntraVENous, Q2H PRN **OR** morphine injection 4 mg, 4 mg, IntraVENous, Q2H PRN  albuterol sulfate HFA (PROVENTIL;VENTOLIN;PROAIR) 108 (90 Base) MCG/ACT inhaler 2 puff, 2 puff, Inhalation, Q6H PRN  pantoprazole (PROTONIX) 40 mg in sodium chloride (PF) 0.9 % 10 mL injection, 40 mg, IntraVENous, Daily  labetalol (NORMODYNE;TRANDATE) injection 10 mg, 10 mg, IntraVENous, Q30 Min PRN  Physical    VITALS:  BP (!) 106/55   Pulse (!) 108   Temp 99.2 °F (37.3 °C) (Oral)   Resp 16   Ht 6' (1.829 m)   Wt 172 lb (78 kg)   SpO2 (!) 89%   BMI 23.33 kg/m²   CONSTITUTIONAL:  awake, cooperative, and no apparent distress  EYES:  extra-ocular muscles intact  ENT:  normocepalic, without obvious abnormality  NECK:  supple, symmetrical, trachea midline  BACK:  symmetric  LUNGS:  no increased work of breathing, no retractions, and clear to auscultation  CARDIOVASCULAR:  normal apical pulses and normal S1 and S2  ABDOMEN:  normal bowel sounds, non-distended, and tenderness noted diffusely  MUSCULOSKELETAL:  there is no redness, warmth, or swelling of the joints  NEUROLOGIC:  Mental Status Exam:  Level of Alertness:   awake  Orientation:   person, place, time  SKIN:  no bruising or bleeding  Data    CBC:   Lab Results   Component Value Date/Time    WBC 11.5 11/15/2022 06:01 AM    RBC 4.00 11/15/2022 06:01 AM    HGB 11.9 11/15/2022 06:01 AM    HCT 37.7 11/15/2022 06:01 AM    MCV 94.3 11/15/2022 06:01 AM    MCH 29.8 11/15/2022 06:01 AM    MCHC 31.6 11/15/2022 06:01 AM    RDW 14.4 11/15/2022 06:01 AM     11/15/2022 06:01 AM    MPV 10.1 11/15/2022 06:01 AM     BMP:    Lab Results   Component Value Date/Time     11/15/2022 06:01 AM    K 4.3 11/15/2022 06:01 AM     11/15/2022 06:01 AM    CO2 28 11/15/2022 06:01 AM    BUN 52 11/15/2022 06:01 AM    LABALBU 2.5 11/15/2022 06:01 AM    CREATININE 1.7 11/15/2022 06:01 AM    CALCIUM 8.1 11/15/2022 06:01 AM    GFRAA >60 03/30/2022 09:22 AM    LABGLOM 42 11/15/2022 06:01 AM    GLUCOSE 83 11/15/2022 06:01 AM       ASSESSMENT AND PLAN      SBO (small bowel obstruction)  Paroxysmal Atrial Fibrillation  KRYSTIN  Hypertension  Hx of COPD  Acute respiratory failure with Hypoxia  GERD  Hx of BUZZ  Hx of CAD  Hypotension:  Hx of tobacco use:    Plans:  Patient went into atrial fibrillation last night,. He was treated with loading dose of dig and cardiology was consulted. Heart rate better controlled. Defer additional medication recommendations to cardiology. Continue to monitor. Continue IV fluid. Renal function is gradually improving  Continue to monitor BP   Continue supplemental oxygen,. Encouraged patient to continue to use incentive spirometry. Will need to qualify patient for home oxygen at discharge.

## 2022-11-15 NOTE — PROGRESS NOTES
GENERAL SURGERY  DAILY PROGRESS NOTE  11/15/2022    Subjective:  Patient had an RRT overnight for A. fib with rapid ventricular rate. He received digoxin followed by Cardizem drip and was transferred to the intermediate floor. This morning he currently feels well. States his abdominal pain has been improved since yesterday    Objective:  BP (!) 106/55   Pulse (!) 108   Temp 99.2 °F (37.3 °C) (Oral)   Resp 16   Ht 6' (1.829 m)   Wt 172 lb (78 kg)   SpO2 (!) 89%   BMI 23.33 kg/m²     Gen: alert, oriented, no apparent distress  HEENT: NCAT, anicteric, NG tube with dark output-1.3 L recorded out last 24 hours  CV: Tachycardic to 115  Pulm: nonlabored breathing on 4 L nasal cannula  Abdomen: soft, mildly tender, moderately distended, incision clean dry intact  Extremities: moving all extremities, no peripheral edema  Skin: warm and dry  Pelletier: Dark yellow 1.15 L last 24 hours    Assessment/Plan:  67 y.o. male with small bowel obstruction status post diagnostic laparoscopy converted to open with small bowel resection on 11/13 now with postoperative ileus and    Appreciate cardiology recommendations and assistance with A. fib with RVR  Continue n.p.o./NG tube to low intermittent wall suction  Continue Pelletier  Monitor urine output  Continue IV fluids at 150 cc/h  KRYSTIN improving  Continue antibiotics  Await return of bowel function  Discussed with Dr. Kristina Peabody    Electronically signed by Early Fabry, MD on 11/15/2022 at 9:06 AM     As above. Cardiology for afib with RVR likely related to sespis  Continue pelletier for ademonitoring  Keep NPO today. Statement Selected

## 2022-11-15 NOTE — PROGRESS NOTES
Dr. Ofelia Harmon (cardioology) was contact during RRT and notified of new consult. Electronically signed by Sally Yuen RN on 11/15/2022 at 3:21 AM

## 2022-11-15 NOTE — PLAN OF CARE
Problem: Discharge Planning  Goal: Discharge to home or other facility with appropriate resources  11/15/2022 1223 by Ron Lowe RN  Outcome: Progressing  11/14/2022 2238 by Avel Quintanilla RN  Outcome: Progressing     Problem: Pain  Goal: Verbalizes/displays adequate comfort level or baseline comfort level  11/15/2022 1223 by Ron Lowe RN  Outcome: Progressing  11/14/2022 2238 by Avel Quintanilla RN  Outcome: Progressing     Problem: Safety - Adult  Goal: Free from fall injury  11/15/2022 1223 by Ron Lowe RN  Outcome: Progressing  11/14/2022 2238 by Avel Quintanilla RN  Outcome: Progressing     Problem: ABCDS Injury Assessment  Goal: Absence of physical injury  11/15/2022 1223 by Ron Lowe RN  Outcome: Progressing  11/14/2022 2238 by Avel Quintanilla RN  Outcome: Progressing     Problem: Skin/Tissue Integrity  Goal: Absence of new skin breakdown  Description: 1. Monitor for areas of redness and/or skin breakdown  2. Assess vascular access sites hourly  3. Every 4-6 hours minimum:  Change oxygen saturation probe site  4. Every 4-6 hours:  If on nasal continuous positive airway pressure, respiratory therapy assess nares and determine need for appliance change or resting period.   11/15/2022 1223 by Ron Lowe RN  Outcome: Progressing  11/14/2022 2238 by Avel Quintanilla RN  Outcome: Progressing

## 2022-11-16 LAB
ALBUMIN SERPL-MCNC: 2.4 G/DL (ref 3.5–5.2)
ALP BLD-CCNC: 71 U/L (ref 40–129)
ALT SERPL-CCNC: 14 U/L (ref 0–40)
ANION GAP SERPL CALCULATED.3IONS-SCNC: 9 MMOL/L (ref 7–16)
AST SERPL-CCNC: 20 U/L (ref 0–39)
BASOPHILS ABSOLUTE: 0.02 E9/L (ref 0–0.2)
BASOPHILS RELATIVE PERCENT: 0.2 % (ref 0–2)
BILIRUB SERPL-MCNC: 0.3 MG/DL (ref 0–1.2)
BUN BLDV-MCNC: 37 MG/DL (ref 6–23)
CALCIUM SERPL-MCNC: 8.1 MG/DL (ref 8.6–10.2)
CHLORIDE BLD-SCNC: 104 MMOL/L (ref 98–107)
CO2: 27 MMOL/L (ref 22–29)
CREAT SERPL-MCNC: 1.2 MG/DL (ref 0.7–1.2)
EKG ATRIAL RATE: 84 BPM
EKG P AXIS: 62 DEGREES
EKG P-R INTERVAL: 168 MS
EKG Q-T INTERVAL: 356 MS
EKG QRS DURATION: 112 MS
EKG QTC CALCULATION (BAZETT): 420 MS
EKG R AXIS: -60 DEGREES
EKG T AXIS: 71 DEGREES
EKG VENTRICULAR RATE: 84 BPM
EOSINOPHILS ABSOLUTE: 0.03 E9/L (ref 0.05–0.5)
EOSINOPHILS RELATIVE PERCENT: 0.3 % (ref 0–6)
GFR SERPL CREATININE-BSD FRML MDRD: >60 ML/MIN/1.73
GLUCOSE BLD-MCNC: 79 MG/DL (ref 74–99)
HBA1C MFR BLD: 5.5 % (ref 4–5.6)
HCT VFR BLD CALC: 33.9 % (ref 37–54)
HEMOGLOBIN: 10.7 G/DL (ref 12.5–16.5)
IMMATURE GRANULOCYTES #: 0.1 E9/L
IMMATURE GRANULOCYTES %: 0.9 % (ref 0–5)
LYMPHOCYTES ABSOLUTE: 1.23 E9/L (ref 1.5–4)
LYMPHOCYTES RELATIVE PERCENT: 10.5 % (ref 20–42)
MCH RBC QN AUTO: 29.6 PG (ref 26–35)
MCHC RBC AUTO-ENTMCNC: 31.6 % (ref 32–34.5)
MCV RBC AUTO: 93.6 FL (ref 80–99.9)
MONOCYTES ABSOLUTE: 1.29 E9/L (ref 0.1–0.95)
MONOCYTES RELATIVE PERCENT: 11 % (ref 2–12)
NEUTROPHILS ABSOLUTE: 9.08 E9/L (ref 1.8–7.3)
NEUTROPHILS RELATIVE PERCENT: 77.1 % (ref 43–80)
PDW BLD-RTO: 14.2 FL (ref 11.5–15)
PLATELET # BLD: 300 E9/L (ref 130–450)
PMV BLD AUTO: 10.1 FL (ref 7–12)
POTASSIUM REFLEX MAGNESIUM: 3.9 MMOL/L (ref 3.5–5)
RBC # BLD: 3.62 E12/L (ref 3.8–5.8)
SODIUM BLD-SCNC: 140 MMOL/L (ref 132–146)
TOTAL PROTEIN: 4.9 G/DL (ref 6.4–8.3)
TSH SERPL DL<=0.05 MIU/L-ACNC: 2.38 UIU/ML (ref 0.27–4.2)
WBC # BLD: 11.8 E9/L (ref 4.5–11.5)

## 2022-11-16 PROCEDURE — 97161 PT EVAL LOW COMPLEX 20 MIN: CPT

## 2022-11-16 PROCEDURE — 80053 COMPREHEN METABOLIC PANEL: CPT

## 2022-11-16 PROCEDURE — 2060000000 HC ICU INTERMEDIATE R&B

## 2022-11-16 PROCEDURE — 6370000000 HC RX 637 (ALT 250 FOR IP): Performed by: SPECIALIST

## 2022-11-16 PROCEDURE — 2580000003 HC RX 258: Performed by: SPECIALIST

## 2022-11-16 PROCEDURE — 2500000003 HC RX 250 WO HCPCS: Performed by: SURGERY

## 2022-11-16 PROCEDURE — 83036 HEMOGLOBIN GLYCOSYLATED A1C: CPT

## 2022-11-16 PROCEDURE — 84443 ASSAY THYROID STIM HORMONE: CPT

## 2022-11-16 PROCEDURE — 99232 SBSQ HOSP IP/OBS MODERATE 35: CPT | Performed by: INTERNAL MEDICINE

## 2022-11-16 PROCEDURE — 6360000002 HC RX W HCPCS: Performed by: SPECIALIST

## 2022-11-16 PROCEDURE — 97535 SELF CARE MNGMENT TRAINING: CPT

## 2022-11-16 PROCEDURE — 85025 COMPLETE CBC W/AUTO DIFF WBC: CPT

## 2022-11-16 PROCEDURE — 6360000002 HC RX W HCPCS: Performed by: SURGERY

## 2022-11-16 PROCEDURE — 97165 OT EVAL LOW COMPLEX 30 MIN: CPT

## 2022-11-16 PROCEDURE — 2580000003 HC RX 258: Performed by: SURGERY

## 2022-11-16 PROCEDURE — 36415 COLL VENOUS BLD VENIPUNCTURE: CPT

## 2022-11-16 PROCEDURE — 2580000003 HC RX 258: Performed by: STUDENT IN AN ORGANIZED HEALTH CARE EDUCATION/TRAINING PROGRAM

## 2022-11-16 PROCEDURE — 2700000000 HC OXYGEN THERAPY PER DAY

## 2022-11-16 PROCEDURE — 2580000003 HC RX 258: Performed by: INTERNAL MEDICINE

## 2022-11-16 PROCEDURE — 97110 THERAPEUTIC EXERCISES: CPT

## 2022-11-16 PROCEDURE — 6360000002 HC RX W HCPCS: Performed by: STUDENT IN AN ORGANIZED HEALTH CARE EDUCATION/TRAINING PROGRAM

## 2022-11-16 PROCEDURE — C9113 INJ PANTOPRAZOLE SODIUM, VIA: HCPCS | Performed by: STUDENT IN AN ORGANIZED HEALTH CARE EDUCATION/TRAINING PROGRAM

## 2022-11-16 PROCEDURE — 93010 ELECTROCARDIOGRAM REPORT: CPT | Performed by: INTERNAL MEDICINE

## 2022-11-16 RX ORDER — AMIODARONE HYDROCHLORIDE 200 MG/1
200 TABLET ORAL 2 TIMES DAILY
Status: DISCONTINUED | OUTPATIENT
Start: 2022-11-16 | End: 2022-11-18 | Stop reason: HOSPADM

## 2022-11-16 RX ORDER — METOPROLOL TARTRATE 50 MG/1
50 TABLET, FILM COATED ORAL 2 TIMES DAILY
Status: DISCONTINUED | OUTPATIENT
Start: 2022-11-16 | End: 2022-11-18 | Stop reason: HOSPADM

## 2022-11-16 RX ADMIN — METRONIDAZOLE 500 MG: 500 INJECTION, SOLUTION INTRAVENOUS at 18:31

## 2022-11-16 RX ADMIN — WATER 1000 MG: 1 INJECTION INTRAMUSCULAR; INTRAVENOUS; SUBCUTANEOUS at 05:25

## 2022-11-16 RX ADMIN — SODIUM CHLORIDE, PRESERVATIVE FREE 40 MG: 5 INJECTION INTRAVENOUS at 10:26

## 2022-11-16 RX ADMIN — Medication 10 ML: at 10:33

## 2022-11-16 RX ADMIN — METOPROLOL TARTRATE 50 MG: 50 TABLET, FILM COATED ORAL at 20:48

## 2022-11-16 RX ADMIN — AMIODARONE HYDROCHLORIDE 200 MG: 200 TABLET ORAL at 20:48

## 2022-11-16 RX ADMIN — SODIUM CHLORIDE, POTASSIUM CHLORIDE, SODIUM LACTATE AND CALCIUM CHLORIDE: 600; 310; 30; 20 INJECTION, SOLUTION INTRAVENOUS at 05:26

## 2022-11-16 RX ADMIN — METRONIDAZOLE 500 MG: 500 INJECTION, SOLUTION INTRAVENOUS at 02:12

## 2022-11-16 RX ADMIN — DEXTROSE 0.5 MG/MIN: 5 SOLUTION INTRAVENOUS at 18:32

## 2022-11-16 RX ADMIN — ENOXAPARIN SODIUM 80 MG: 100 INJECTION SUBCUTANEOUS at 20:48

## 2022-11-16 RX ADMIN — METRONIDAZOLE 500 MG: 500 INJECTION, SOLUTION INTRAVENOUS at 10:27

## 2022-11-16 RX ADMIN — SODIUM CHLORIDE, POTASSIUM CHLORIDE, SODIUM LACTATE AND CALCIUM CHLORIDE: 600; 310; 30; 20 INJECTION, SOLUTION INTRAVENOUS at 23:53

## 2022-11-16 RX ADMIN — ENOXAPARIN SODIUM 80 MG: 100 INJECTION SUBCUTANEOUS at 10:26

## 2022-11-16 ASSESSMENT — PAIN SCALES - GENERAL
PAINLEVEL_OUTOF10: 0

## 2022-11-16 NOTE — PROGRESS NOTES
[address] [facility]  [facility] [place]. [place]        Date:2022                                                  Patient Name: Tesfaye Herron    MRN: [id number]    : 1950    Room: [address] [place], [place] [address]     Evaluating OT: Dyana Hernandez OTR/L #[id number]     Referring Provider and Specific Provider Orders/Date:      11/15/22 1530  OT eval and treat  Start:  11/15/22 1530,   End:  11/15/22 1530,   ONE TIME,   Standing Count:  1 Occurrences,   Pati Santoro MD      Placement Recommendation: Home with Home Health OT  vs Home       Diagnosis:   1. Intestinal obstruction, unspecified cause, unspecified whether partial or complete ([facility]    2. Intestinal adhesions with partial obstruction [facility])         Surgery:     Date of Procedure: 2022     Pre-Op Diagnosis: SMALL BOWEL OBSTRUCTION     Post-Op Diagnosis: Same       Procedure:   Diagnostic laparoscopy with laparoscopic lysis of adhesions extensive adhesions  Conversion to exploratory laparotomy with small bowel resection     Surgeon(s):  Chrissy Esparza MD     Pertinent Medical History:       Past Medical History:   Diagnosis Date    CAD (coronary artery disease)     Colon adenocarcinoma ([facility] 2018    COPD (chronic obstructive pulmonary disease) ([facility]     Elevated triglycerides with high cholesterol 1/10/2017    Emphysema of lung (HCC)     GERD (gastroesophageal reflux disease)     Hx of splenectomy 2017    40 years ago ruptured    Hypertension     Macular degeneration, bilateral 2022    follows [facility]     Multiple lung nodules on CT 2017    Neuropathy     Osteoarthritis     Polyp of colon 12/15/2017    Robotic-assist Low anterior rectosigmoid resection 2018 DR. D'Amico Pathology invasive moderately differentiated  adenocarcinoma arising in association with tubulovillous adenoma 5 lymph nodes removed and negative for metastatic carcinoma    Pulmonary hypertension ([facility] 1/10/2020    Echo Dr Mulugeta Aviles 12/30/19 SR, Left ventricular size is normal, mild concentric LVH, EF 55 - 31% Diastolic filling pattern indicates impaired relaxation, left atrium mildly dilated, mild AV sclerosis without stenosis, mild mitral regurgitation, mild tricuspid regurgitation, mild to moderate pulmonary hypertension, no pericardial effusion. Sleep apnea     Tick bite of abdomen 4/8/2019    Valvular heart disease 1/10/2020    Echo Dr Mulugeta Aviles 12/30/19 SR, Left ventricular size is normal, mild concentric LVH, EF 55 - 00% Diastolic filling pattern indicates impaired relaxation, left atrium mildly dilated, mild AV sclerosis without stenosis, mild mitral regurgitation, mild tricuspid regurgitation, mild to moderate pulmonary hypertension, no pericardial effusion.  See scanned echo         Past Surgical History:   Procedure Laterality Date    CARDIAC CATHETERIZATION      COLECTOMY N/A 11/13/2022    DIAGNOSTIC LAPAROSCOPY WITH POSSIBLE BOWEL RESECTION LAPAROSCOPIC performed by Kirti Lacey MD at [facility] Left 03/2022    HEMORRHOID SURGERY      SPLENECTOMY      TONSILLECTOMY AND ADENOIDECTOMY        Precautions:  Fall Risk, up as tolerated, NGT, 6L O2 via nasal canula, abdominal precautions      Assessment of current deficits    [x] Functional mobility  [x]ADLs  [x] Strength               []Cognition    [x] Functional transfers   [x] IADLs         [] Safety Awareness   [x]Endurance    [] Fine Coordination              [x] Balance      [] Vision/perception   []Sensation     []Gross Motor Coordination  [] ROM  [] Delirium                   [] Motor Control     OT PLAN OF CARE   OT POC based on physician orders, patient diagnosis and results of clinical assessment    Frequency/Duration 1-3 days/wk for 2 weeks PRN     Specific OT Treatment Interventions to include:   * Instruction/training on adapted ADL techniques and AE recommendations to increase functional independence within precautions       * Training on energy conservation strategies, correct breathing pattern and techniques to improve independence/tolerance for self-care routine  * Functional transfer/mobility training/DME recommendations for increased independence, safety, and fall prevention  * Patient/Family education to increase follow through with safety techniques and functional independence  * Recommendation of environmental modifications for increased safety with functional transfers/mobility and ADLs  * Therapeutic exercise to improve motor endurance, ROM, and functional strength for ADLs/functional transfers  * Therapeutic activities to facilitate/challenge dynamic balance, stand tolerance for increased safety and independence with ADLs    Recommended Adaptive Equipment: shower chair      Home Living: Lives alone, single family home, 2 story, 5-6 steps to enter with rail. [place] [id number]        Equipment owned: None     Prior Level of Function: Independent with ADLs , Independent with IADLs; ambulated independently. Driving: Yes   Occupation: Works occasionally doing manual work      Pain Level: 2/10 pain generalized; Nursing notified.       Cognition: A&O: 4/4; Follows 3 step directions   Memory: good    Sequencing: good    Problem solving: good    Judgement/safety: good     [facility]   AM-PAC Daily Activity Inpatient   How much help for putting on and taking off regular lower body clothing?: A Little  How much help for Bathing?: A Lot  How much help for Toileting?: A Lot  How much help for putting on and taking off regular upper body clothing?: A Little  How much help for taking care of personal grooming?: A Little  How much help for eating meals?: A Little  AM-[facility] Inpatient Daily Activity Raw Score: 16  AM-PAC Inpatient ADL T-Scale Score : 35.96  ADL Inpatient CMS 0-100% Score: 53.32  ADL Inpatient CMS G-Code Modifier : CK     Functional Assessment:    Initial Eval Status  Date: 11/16/22   Treatment Status  Date: STGs = LTGs  Time frame: 10-14 days   Feeding Supervision   Liquid diet     Independent    Grooming Supervision   Set-up for hair groom     Independent    UB Dressing Minimal Assist   For gown mgmt     Independent    LB Dressing Minimal Assist   To doff/don socks seated in chair via cross-leg technique     Independent    Bathing Moderate Assist     Independent    Toileting Moderate Assist   Set-up for use of urinal     Independent    Bed Mobility  Supine to sit: Minimal Assist   Sit to supine:  Not Assessed    Instructed to brace pillow for bed mobility to maintain abdominal precautions     Supine to sit: Independent   Sit to supine: Independent    Functional Transfers Sit to stand: Supervision   Stand to sit: Supervision     Transfer training with verbal cues for hand placement throughout session to improve safety. Independent    Functional Mobility Minimal Assist with wheeled walker to improve balance within room to simulate household distances, verbal cues for walker sequence and safety.      Independent    Balance Sitting:     Static: good    Dynamic: good  Standing: fair / fair plus     Sitting:     Static: good    Dynamic: good  Standing: good    Activity Tolerance fair  plus   Increase standing tolerance >3-5 minutes for improved engagement with functional transfers and indep in ADLs     Visual/  Perceptual Glasses: readers     Reports changes in vision since admission: no      NA      Hand Dominance: right      AROM (PROM) Strength Additional Info:  Goal:   RUE  WFL 4-/5 grossly  good  and wfl FMC/dexterity noted during ADL tasks   Improve overall RUE strength  for participation in functional tasks   LUE WFL 4-/5 grossly  good  and wfl FMC/dexterity noted during ADL tasks   Improve overall LUE strength  for participation in functional tasks     Hearing: Hard of hearing; wears hearing aids    Sensation:  No c/o numbness or tingling  Tone: [facility] Edema:      Vitals:  HR at rest: 66 bpm HR with activity: 73 bpm HR at end of session: 65 bpm   SpO2 at rest: 96% SpO2 with activity: 95% SpO2 at end of session: %   BP at rest:  BP with activity:  BP at end of session:      Comments: RN cleared patient for OT. Upon arrival patient in supine. Therapist facilitated and instructed pt on adapted  techniques & compensatory strategies to improve safety and independence with basic ADLs, bed mobility, functional transfers and mobility to allow pt to achieve highest level of independence and safely. Pt demonstrated good understanding of education & follow through. At end of session, patient was in chair with call light and phone within reach, all lines and tubes intact. Overall, patient demonstrated  decreased independence and safety during completion of ADL tasks. Pt would benefit from continued skilled OT to increase safety and independence with completion of ADL tasks and functional mobility for improved quality of life. Treatment: OT treatment provided this date includes:   Instructions/training on safety, sequencing, and adapted techniques for completion of ADLs. Facilitated bed mobility with cues for proper body mechanics and sequencing to prepare for ADL completion. Instruction/training on safe functional mobility/transfer techniques including hand and feet placement   Instruction/training on energy conservation/work simplification for completion of ADLs   Skilled monitoring of O2 sats - see section above     Rehab Potential: Good for established goals. Patient / Family Goal: pt in agreement with POC      Patient and/or family were instructed on functional diagnosis, prognosis/goals and OT plan of care. Demonstrated good understanding.      Eval Complexity: Low    Time In: 9:50 AM   Time Out: 10:20 AM    Total Treatment Time: 10       Min Units   OT Eval Low 97165  X  1    OT Eval Medium 21553      OT Eval High 37085      OT Re-Eval [id number] ADL/Self Care 16341 10 1   Therapeutic Activities 39791       Therapeutic Ex 16168       Orthotic Management 03707       Manual 40400     Neuro Re-Ed [id number]       Non-Billable Time        Evaluation Time additionally includes thorough review of current medical information, gathering information on past medical history/social history and prior level of function, interpretation of standardized testing/informal observation of tasks, assessment of data and development of plan of care and goals.         Evaluating OT: Oleg Luciano OTR/L #[id number]

## 2022-11-16 NOTE — PROGRESS NOTES
GENERAL SURGERY  DAILY PROGRESS NOTE  11/16/2022    Subjective:  Feels well passing flatus denies bowel movements overnight. NG tube output likely from ice chips as it looks much more clear compared to yesterday    Objective:  /65   Pulse 72   Temp 98.4 °F (36.9 °C) (Oral)   Resp 16   Ht 6' (1.829 m)   Wt 172 lb (78 kg)   SpO2 94%   BMI 23.33 kg/m²     Gen: alert, oriented, no apparent distress  HEENT: NCAT, anicteric  CV: RR  Pulm: nonlabored breathing on 5 L nasal cannula  Abdomen: soft, minimally tender, nondistended, incision clean dry intact  Extremities: moving all extremities, no peripheral edema  Skin: warm and dry    Assessment/Plan:  67 y.o. male with resolving ileus status post diagnostic laparoscopy converted to open with small bowel resection on 11/13. KRYSTIN resolving    Clamp NG tube  Clear liquid diet  If patient develops nausea or emesis Place NG tube back to low intermittent wall suction and make n.p.o.  Decrease IV fluids IV fluids were decreased this morning  Discussed with Dr. Kari Flores    Electronically signed by Mariana Willis MD on 11/16/2022 at 8:04 AM     As above.

## 2022-11-16 NOTE — PROGRESS NOTES
Physical Therapy Initial Evaluation/Plan of Care    Room #:  1663/8631-23  Patient Name: Hossein Stokes  YOB: 1950  MRN: [id number]    Date of Service: 11/16/2022     Tentative placement recommendation: Home (patient not open to [address])  Equipment recommendation: possibly Milinda Delgadillo  if balance does not improve by discharge time and possibly O2     Evaluating Physical Therapist: Shaka Quijano #[id number]      Specific Provider Orders/Date/Referring Provider :   11/15/22 1530    PT eval and treat  Start:  11/15/22 1530,   End:  11/15/22 1530,   ONE TIME,   Standing Count:  1 Occurrences,   John Morales MD     Admitting Diagnosis:   SBO (small bowel obstruction) ([address] [K56.609]  Intestinal obstruction, unspecified cause, unspecified whether partial or complete [facility]) [K56.609]      Surgery:     Date of Procedure: 11/13/2022     Pre-Op Diagnosis: SMALL BOWEL OBSTRUCTION     Post-Op Diagnosis: Same       Procedure:   Diagnostic laparoscopy with laparoscopic lysis of adhesions extensive adhesions  Conversion to exploratory laparotomy with small bowel resection     Surgeon(s):  Yoel Germain MD  Visit Diagnoses         Codes    Intestinal obstruction, unspecified cause, unspecified whether partial or complete [facility])    -  Primary K56.609    Intestinal adhesions with partial obstruction ([address]     K56.51            Patient Active Problem List   Diagnosis    Essential hypertension    Bradycardia    Nocturnal hypoxemia due to emphysema (HCC)    Obstructive sleep apnea treated with continuous positive airway pressure (CPAP)    Centrilobular emphysema ([address]    Gastroesophageal reflux disease with esophagitis    Vitreous degeneration    Presbyopia    After-cataract    Monocytosis    Esophageal stenosis    Chronic bilateral low back pain with left-sided sciatica    Right anterior knee pain    Valvular heart disease    Pulmonary hypertension ([address]    Enlarged aorta ([address]    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 Mild unsteady on feet with wheeled walker. Supervision for transfers and min A for gait. Patient has full flight of stairs to perform at home. Perform stairs when appropriate before  discharge. Desaturation to 87% on 5L during gait, able to recover to 94% within 30 seconds. The patient will benefit from continued skilled therapy to increase strength and improve balance for safe functional mobility, to decrease risk of falls, and to meet goals at discharge. PHYSICAL THERAPY  PLAN OF CARE       Physical therapy plan of care is established based on physician order,  patient diagnosis and clinical assessment    Current Treatment Recommendations:    -Bed Mobility: Lower extremity exercises  and Upper extremity exercises   -Sitting Balance: Incorporate reaching activities to activate trunk muscles   -Standing Balance: Perform strengthening exercises in standing to promote motor control with or without upper extremity support   -Transfers: Provide instruction on proper hand and foot position for adequate transfer of weight onto lower extremities and use of gait device if needed and Cues for hand placement, technique and safety.  Provide stabilization to prevent fall   -Gait: Gait training and Standing activities to improve: base of support, weight shift, weight bearing    -Endurance: Utilize Supervised activities to increase level of endurance to allow for safe functional mobility including transfers and gait   -Stairs: Stair training with instruction on proper technique and hand placement on rail    PT long term treatment goals are located in below grid    Patient and or family understand(s) diagnosis, prognosis, and plan of care. Frequency of treatments: Patient will be seen  daily. 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 D'Amico Pathology invasive moderately differentiated  adenocarcinoma arising in association with tubulovillous adenoma 5 lymph nodes removed and negative for metastatic carcinoma    Pulmonary hypertension ([facility] 1/10/2020    Echo Dr Turner Standard 12/30/19 SR, Left ventricular size is normal, mild concentric LVH, EF 55 - 44% Diastolic filling pattern indicates impaired relaxation, left atrium mildly dilated, mild AV sclerosis without stenosis, mild mitral regurgitation, mild tricuspid regurgitation, mild to moderate pulmonary hypertension, no pericardial effusion.     Sleep apnea     Tick bite of abdomen 4/8/2019    Valvular heart disease 1/10/2020    Echo Dr Turner Standard 12/30/19 SR, Left ventricular size is normal, mild concentric LVH, EF 55 - 51% Diastolic filling pattern indicates impaired relaxation, left atrium mildly dilated, mild AV sclerosis without stenosis, mild mitral regurgitation, mild tricuspid regurgitation, mild to moderate pulmonary hypertension, no pericardial effusion. See scanned echo     Past Surgical History:   Procedure Laterality Date    CARDIAC CATHETERIZATION      COLECTOMY N/A 11/13/2022    DIAGNOSTIC LAPAROSCOPY WITH POSSIBLE BOWEL RESECTION LAPAROSCOPIC performed by Michelle Hernández MD at [facility] Left 03/2022    HEMORRHOID SURGERY      SPLENECTOMY      TONSILLECTOMY AND ADENOIDECTOMY         SUBJECTIVE:    Precautions: Up as tolerated, falls , ng tube, 5L O2 (no O2 at home)    Social history: Patient lives alone in a two story home bedroom and bathroom 2nd floor full flight stairs with Rail  with 5 steps  to enter with [employer] in shower with 4 inch step over grab bars    Equipment owned: [employer],      [address] [place] [address]   How much difficulty turning over in bed?: None  How much difficulty sitting down on / standing up from a chair with arms?: A Little  How much difficulty moving from lying on back to sitting on side of bed?: A Little  How much help from another person moving to and from a bed to a chair?: A Little  How much help from another person needed to walk in hospital room?: A Little  How much help from another person for climbing 3-5 steps with a railing?: A Lot  AM-PAC Inpatient Mobility Raw Score : 18  AM-PAC Inpatient T-Scale Score : 43.63  Mobility Inpatient CMS 0-100% Score: 46.58  Mobility Inpatient CMS G-Code Modifier : CK    Nursing cleared patient for PT evaluation. The admitting diagnosis and active problem list as listed above have been reviewed prior to the initiation of this evaluation. OBJECTIVE;   Initial Evaluation  Date: 11/16/2022 Treatment Date:     Short Term/ Long Term   Goals   Was pt agreeable to Eval/treatment?  Yes  To be met in 4 days   Pain level   0/10       Bed Mobility    Rolling: Not assessed patient in chair    Supine to sit: Not assessed patient in chair    Sit to supine: Not assessed patient in chair    Scooting: Not assessed patient in chair    Rolling: Independent    Supine to sit: Independent    Sit to supine: Independent    Scooting: Independent     Transfers Sit to stand: Supervision  cues for hand placement  Sit to stand: Independent    Ambulation     50 feet using  wheeled walker with Minimal assist of 1   for walker control, balance, and upright and cues for upright posture, safety, and pacing    100 feet using  no device with Independent    Stair negotiation: ascended and descended   Not assessed     10 steps with HR and independence    ROM Within functional limits    Increase range of motion 10% of affected joints    Strength BUE:  refer to OT eval  RLE:  4+/5  LLE:  4+/5  Increase strength in affected mm groups by 1/3 grade   Balance Sitting EOB:  not assessed   Dynamic Standing:  fair + wheeled walker    Sitting EOB:  good   Dynamic Standing: good      Patient is Alert & Oriented x person, place, time, and situation and follows directions    Sensation:  Patient  denies numbness/tingling   Edema:  no   Endurance: fair  +    Vitals:  5 liters nasal cannula   Blood Pressure at rest  Blood Pressure during session    Heart Rate at rest  Heart Rate during session    SPO2 at rest 92%  SPO2 during session %     Patient education  Patient educated on role of Physical Therapy, risks of immobility, safety and plan of care,  importance of mobility while in hospital , purse lip breathing, ankle pumps, quad set and glut set for edema control, blood clot prevention, and safety      Patient response to education:   Pt verbalized understanding Pt demonstrated skill Pt requires further education in this area   Yes Partial Yes      Treatment:  Patient practiced and was instructed/facilitated in the following treatment: Patient in chair. Pt stood from chair, cues for hand placement. Pt ambulated as above on 5L, assisted up in chair, performed exercises and incentive spirometer.       Therapeutic Exercises:  ankle pumps, glut sets, long arc quad, and seated marching  x 15 reps. At end of session, patient in chair with  call light and phone within reach,  all lines and tubes intact, nursing notified. Patient would benefit from continued skilled Physical Therapy to improve functional independence and quality of life. Patient's/ family goals   home    Time in  1024  Time out  1054    Total Treatment Time  10 minutes    Evaluation time includes thorough review of current medical information, gathering information on past medical history/social history and prior level of function, completion of standardized testing/informal observation of tasks, assessment of data, and development of Plan of care and goals.      CPT codes:  Low Complexity PT evaluation (03444)  Therapeutic exercises (70001)   10 minutes  1 unit(s)    Jillian Maurer, PT

## 2022-11-16 NOTE — CARE COORDINATION
11/16/2022 1550 CM for transition of care needs at d/c. Pt plan is to return home alone as prior. Pt is independent and drives. Pt has no DME. Pt not receptive to having C. Pt is on a amiodarone drip, continues with N/G tube but today is clamped. Pt's friend Jesus will provide transportation at d/c. CM will follow.  Electronically signed by Hesham Aguilar RN on 11/16/2022 at 3:55 PM

## 2022-11-16 NOTE — PROGRESS NOTES
Department of Internal Medicine  General Internal Medicine  Attending Progress Note  Chief Complaint   Patient presents with    Abdominal Pain     RT MID FLANK STARTED NAUSEA WITH DRY HEAVES/ DENIES DIARRHEA     SUBJECTIVE:    Reports that he is doing well. Denied fever and chills. No chest pain. Aware of plans to continue current meds.      OBJECTIVE      Medications    Current Facility-Administered Medications: metoprolol (LOPRESSOR) injection 5 mg, 5 mg, IntraVENous, PRN  dilTIAZem 125 mg in dextrose 5 % 125 mL infusion, 2.5-15 mg/hr, IntraVENous, Continuous  enoxaparin (LOVENOX) injection 80 mg, 80 mg, SubCUTAneous, BID  perflutren lipid microspheres (DEFINITY) injection 1.5 mL, 1.5 mL, IntraVENous, ONCE PRN  perflutren lipid microspheres (DEFINITY) injection 1.5 mL, 1.5 mL, IntraVENous, ONCE PRN  Benzocaine-Menthol (CEPACOL) 1 lozenge, 1 lozenge, Oral, Q2H PRN  cefTRIAXone (ROCEPHIN) 1,000 mg in sterile water 10 mL IV syringe, 1,000 mg, IntraVENous, Q24H  metronidazole (FLAGYL) 500 mg in 0.9% NaCl 100 mL IVPB premix, 500 mg, IntraVENous, Q8H  Arformoterol Tartrate (BROVANA) nebulizer solution 15 mcg, 15 mcg, Nebulization, BID **AND** ipratropium (ATROVENT) 0.02 % nebulizer solution 0.5 mg, 0.5 mg, Nebulization, 4x daily  lidocaine (XYLOCAINE) 2 % jelly, , Topical, Once  sodium chloride flush 0.9 % injection 10 mL, 10 mL, IntraVENous, 2 times per day  sodium chloride flush 0.9 % injection 10 mL, 10 mL, IntraVENous, PRN  0.9 % sodium chloride infusion, , IntraVENous, PRN  ondansetron (ZOFRAN-ODT) disintegrating tablet 4 mg, 4 mg, Oral, Q8H PRN **OR** ondansetron (ZOFRAN) injection 4 mg, 4 mg, IntraVENous, Q6H PRN  lactated ringers infusion, , IntraVENous, Continuous  morphine (PF) injection 2 mg, 2 mg, IntraVENous, Q2H PRN **OR** morphine injection 4 mg, 4 mg, IntraVENous, Q2H PRN  albuterol sulfate HFA (PROVENTIL;VENTOLIN;PROAIR) 108 (90 Base) MCG/ACT inhaler 2 puff, 2 puff, Inhalation, Q6H PRN  pantoprazole (PROTONIX) 40 mg in sodium chloride (PF) 0.9 % 10 mL injection, 40 mg, IntraVENous, Daily  labetalol (NORMODYNE;TRANDATE) injection 10 mg, 10 mg, IntraVENous, Q30 Min PRN  Physical    VITALS:  /65   Pulse 70   Temp 98.2 °F (36.8 °C) (Oral)   Resp 16   Ht 6' (1.829 m)   Wt 172 lb (78 kg)   SpO2 94%   BMI 23.33 kg/m²   CONSTITUTIONAL:  awake, cooperative, and no apparent distress  EYES:  extra-ocular muscles intact and vision intact  ENT:  normocepalic, without obvious abnormality  NECK:  supple, symmetrical, trachea midline  BACK:  symmetric  LUNGS:  no increased work of breathing, no retractions, and clear to auscultation  CARDIOVASCULAR:  normal apical pulses and normal S1 and S2  ABDOMEN:  normal bowel sounds, non-distended, and non-tender  MUSCULOSKELETAL:  there is no redness, warmth, or swelling of the joints  NEUROLOGIC:  Mental Status Exam:  Level of Alertness:   awake  Orientation:   person, place, time  SKIN:  no bruising or bleeding  Data    CBC:   Lab Results   Component Value Date/Time    WBC 11.8 11/16/2022 06:19 AM    RBC 3.62 11/16/2022 06:19 AM    HGB 10.7 11/16/2022 06:19 AM    HCT 33.9 11/16/2022 06:19 AM    MCV 93.6 11/16/2022 06:19 AM    MCH 29.6 11/16/2022 06:19 AM    MCHC 31.6 11/16/2022 06:19 AM    RDW 14.2 11/16/2022 06:19 AM     11/16/2022 06:19 AM    MPV 10.1 11/16/2022 06:19 AM     BMP:    Lab Results   Component Value Date/Time     11/16/2022 06:19 AM    K 3.9 11/16/2022 06:19 AM     11/16/2022 06:19 AM    CO2 27 11/16/2022 06:19 AM    BUN 37 11/16/2022 06:19 AM    LABALBU 2.4 11/16/2022 06:19 AM    CREATININE 1.2 11/16/2022 06:19 AM    CALCIUM 8.1 11/16/2022 06:19 AM    GFRAA >60 03/30/2022 09:22 AM    LABGLOM >60 11/16/2022 06:19 AM    GLUCOSE 79 11/16/2022 06:19 AM       ASSESSMENT AND PLAN      SBO (small bowel obstruction):  Pain control. Continue to monitor. NG tube still in place. However clamped and advancing diet as tolerated.   Intestinal obstruction ([address]  Atrial fibrillation with RVR: Rate is controlled. Continue amiodarone. We will continue to monitor. KRYSTIN: Much improved. Decrease IV fluid to 75 cc/h. Hypertension essential: Continue to monitor. History of tobacco abuse: Counseled on cessation. Acute respiratory failure with hypoxia: Wean off oxygen as tolerated continue to encourage patient to use incentive spirometry. Current requiring supplemental oxygen currently. We will try to wean of before discharge. History of COPD: Not in exacerbation. Hypotension: Resolved.

## 2022-11-17 LAB
ALBUMIN SERPL-MCNC: 2.5 G/DL (ref 3.5–5.2)
ALP BLD-CCNC: 73 U/L (ref 40–129)
ALT SERPL-CCNC: 16 U/L (ref 0–40)
ANION GAP SERPL CALCULATED.3IONS-SCNC: 10 MMOL/L (ref 7–16)
AST SERPL-CCNC: 19 U/L (ref 0–39)
BASOPHILS ABSOLUTE: 0 E9/L (ref 0–0.2)
BASOPHILS RELATIVE PERCENT: 0 % (ref 0–2)
BILIRUB SERPL-MCNC: 0.3 MG/DL (ref 0–1.2)
BUN BLDV-MCNC: 24 MG/DL (ref 6–23)
CALCIUM SERPL-MCNC: 8.2 MG/DL (ref 8.6–10.2)
CHLORIDE BLD-SCNC: 104 MMOL/L (ref 98–107)
CO2: 28 MMOL/L (ref 22–29)
CREAT SERPL-MCNC: 0.9 MG/DL (ref 0.7–1.2)
EOSINOPHILS ABSOLUTE: 0 E9/L (ref 0.05–0.5)
EOSINOPHILS RELATIVE PERCENT: 0 % (ref 0–6)
GFR SERPL CREATININE-BSD FRML MDRD: >60 ML/MIN/1.73
GLUCOSE BLD-MCNC: 85 MG/DL (ref 74–99)
HCT VFR BLD CALC: 33.1 % (ref 37–54)
HEMOGLOBIN: 10.9 G/DL (ref 12.5–16.5)
LYMPHOCYTES ABSOLUTE: 0.22 E9/L (ref 1.5–4)
LYMPHOCYTES RELATIVE PERCENT: 2 % (ref 20–42)
MCH RBC QN AUTO: 30.6 PG (ref 26–35)
MCHC RBC AUTO-ENTMCNC: 32.9 % (ref 32–34.5)
MCV RBC AUTO: 93 FL (ref 80–99.9)
MONOCYTES ABSOLUTE: 1.31 E9/L (ref 0.1–0.95)
MONOCYTES RELATIVE PERCENT: 12 % (ref 2–12)
MYELOCYTE PERCENT: 1 % (ref 0–0)
NEUTROPHILS ABSOLUTE: 9.37 E9/L (ref 1.8–7.3)
NEUTROPHILS RELATIVE PERCENT: 85 % (ref 43–80)
NUCLEATED RED BLOOD CELLS: 1 /100 WBC
PDW BLD-RTO: 14.1 FL (ref 11.5–15)
PLATELET # BLD: 346 E9/L (ref 130–450)
PMV BLD AUTO: 10 FL (ref 7–12)
POIKILOCYTES: ABNORMAL
POLYCHROMASIA: ABNORMAL
POTASSIUM REFLEX MAGNESIUM: 3.6 MMOL/L (ref 3.5–5)
RBC # BLD: 3.56 E12/L (ref 3.8–5.8)
SODIUM BLD-SCNC: 142 MMOL/L (ref 132–146)
TARGET CELLS: ABNORMAL
TOTAL PROTEIN: 5.2 G/DL (ref 6.4–8.3)
WBC # BLD: 10.9 E9/L (ref 4.5–11.5)

## 2022-11-17 PROCEDURE — 99232 SBSQ HOSP IP/OBS MODERATE 35: CPT | Performed by: INTERNAL MEDICINE

## 2022-11-17 PROCEDURE — C9113 INJ PANTOPRAZOLE SODIUM, VIA: HCPCS | Performed by: STUDENT IN AN ORGANIZED HEALTH CARE EDUCATION/TRAINING PROGRAM

## 2022-11-17 PROCEDURE — 2580000003 HC RX 258: Performed by: STUDENT IN AN ORGANIZED HEALTH CARE EDUCATION/TRAINING PROGRAM

## 2022-11-17 PROCEDURE — 6370000000 HC RX 637 (ALT 250 FOR IP): Performed by: SURGERY

## 2022-11-17 PROCEDURE — 6370000000 HC RX 637 (ALT 250 FOR IP): Performed by: SPECIALIST

## 2022-11-17 PROCEDURE — 2060000000 HC ICU INTERMEDIATE R&B

## 2022-11-17 PROCEDURE — 97530 THERAPEUTIC ACTIVITIES: CPT

## 2022-11-17 PROCEDURE — 2500000003 HC RX 250 WO HCPCS: Performed by: SURGERY

## 2022-11-17 PROCEDURE — 2580000003 HC RX 258: Performed by: SURGERY

## 2022-11-17 PROCEDURE — 85025 COMPLETE CBC W/AUTO DIFF WBC: CPT

## 2022-11-17 PROCEDURE — 6360000002 HC RX W HCPCS: Performed by: STUDENT IN AN ORGANIZED HEALTH CARE EDUCATION/TRAINING PROGRAM

## 2022-11-17 PROCEDURE — 80053 COMPREHEN METABOLIC PANEL: CPT

## 2022-11-17 PROCEDURE — 2700000000 HC OXYGEN THERAPY PER DAY

## 2022-11-17 PROCEDURE — 6360000002 HC RX W HCPCS: Performed by: SURGERY

## 2022-11-17 PROCEDURE — 36415 COLL VENOUS BLD VENIPUNCTURE: CPT

## 2022-11-17 PROCEDURE — 97110 THERAPEUTIC EXERCISES: CPT

## 2022-11-17 PROCEDURE — A4216 STERILE WATER/SALINE, 10 ML: HCPCS | Performed by: STUDENT IN AN ORGANIZED HEALTH CARE EDUCATION/TRAINING PROGRAM

## 2022-11-17 RX ORDER — MORPHINE SULFATE 2 MG/ML
2 INJECTION, SOLUTION INTRAMUSCULAR; INTRAVENOUS
Status: DISCONTINUED | OUTPATIENT
Start: 2022-11-17 | End: 2022-11-18

## 2022-11-17 RX ORDER — OXYCODONE HYDROCHLORIDE 5 MG/1
5 TABLET ORAL EVERY 4 HOURS PRN
Status: DISCONTINUED | OUTPATIENT
Start: 2022-11-17 | End: 2022-11-18 | Stop reason: HOSPADM

## 2022-11-17 RX ORDER — OXYCODONE HYDROCHLORIDE 5 MG/1
10 TABLET ORAL EVERY 4 HOURS PRN
Status: DISCONTINUED | OUTPATIENT
Start: 2022-11-17 | End: 2022-11-18 | Stop reason: HOSPADM

## 2022-11-17 RX ADMIN — WATER 1000 MG: 1 INJECTION INTRAMUSCULAR; INTRAVENOUS; SUBCUTANEOUS at 06:37

## 2022-11-17 RX ADMIN — Medication 10 ML: at 20:15

## 2022-11-17 RX ADMIN — METRONIDAZOLE 500 MG: 500 INJECTION, SOLUTION INTRAVENOUS at 13:05

## 2022-11-17 RX ADMIN — METRONIDAZOLE 500 MG: 500 INJECTION, SOLUTION INTRAVENOUS at 17:27

## 2022-11-17 RX ADMIN — METRONIDAZOLE 500 MG: 500 INJECTION, SOLUTION INTRAVENOUS at 02:12

## 2022-11-17 RX ADMIN — ENOXAPARIN SODIUM 80 MG: 100 INJECTION SUBCUTANEOUS at 20:14

## 2022-11-17 RX ADMIN — METOPROLOL TARTRATE 50 MG: 50 TABLET, FILM COATED ORAL at 20:11

## 2022-11-17 RX ADMIN — AMIODARONE HYDROCHLORIDE 200 MG: 200 TABLET ORAL at 09:52

## 2022-11-17 RX ADMIN — Medication 1 LOZENGE: at 20:11

## 2022-11-17 RX ADMIN — Medication 10 ML: at 09:52

## 2022-11-17 RX ADMIN — METOPROLOL TARTRATE 50 MG: 50 TABLET, FILM COATED ORAL at 09:52

## 2022-11-17 RX ADMIN — SODIUM CHLORIDE, PRESERVATIVE FREE 10 ML: 5 INJECTION INTRAVENOUS at 06:37

## 2022-11-17 RX ADMIN — SODIUM CHLORIDE, PRESERVATIVE FREE 40 MG: 5 INJECTION INTRAVENOUS at 09:52

## 2022-11-17 RX ADMIN — ENOXAPARIN SODIUM 80 MG: 100 INJECTION SUBCUTANEOUS at 09:51

## 2022-11-17 RX ADMIN — AMIODARONE HYDROCHLORIDE 200 MG: 200 TABLET ORAL at 20:11

## 2022-11-17 ASSESSMENT — PAIN SCALES - GENERAL
PAINLEVEL_OUTOF10: 0

## 2022-11-17 NOTE — PROGRESS NOTES
Pt started on PO Amiodarone and Metoprolol per cardiology orders, RN called Dr Mary Naik and clarified when he wanted Amiodarone gtt d/c'ed and orders received to discontinue gtt in morning at 0700.  Electronically signed by Thania Díaz RN on 11/16/2022 at 9:34 PM

## 2022-11-17 NOTE — PROGRESS NOTES
Physical Therapy Treatment Note/Plan of Care    Room #:  8936/4870-09  Patient Name: Mir Valenzuela  YOB: 1950  MRN: [id number]    Date of Service: 11/17/2022     Tentative placement recommendation: Home (patient not open to [address])  Equipment recommendation: possibly [employer]  if balance does not improve by discharge time and possibly O2     Evaluating Physical Therapist: Shaka Boyd #[id number]      Specific Provider Orders/Date/Referring Provider :   11/15/22 1530    PT eval and treat  Start:  11/15/22 1530,   End:  11/15/22 1530,   ONE TIME,   Standing Count:  1 Occurrences,   Renee Gaitan MD     Admitting Diagnosis:   SBO (small bowel obstruction) ([address] [K56.609]  Intestinal obstruction, unspecified cause, unspecified whether partial or complete [facility]) [K56.609]      Surgery:     Date of Procedure: 11/13/2022     Pre-Op Diagnosis: SMALL BOWEL OBSTRUCTION     Post-Op Diagnosis: Same       Procedure:   Diagnostic laparoscopy with laparoscopic lysis of adhesions extensive adhesions  Conversion to exploratory laparotomy with small bowel resection     Surgeon(s):  Damon Spencer MD  Visit Diagnoses         Codes    Intestinal obstruction, unspecified cause, unspecified whether partial or complete [facility])    -  Primary K56.609    Intestinal adhesions with partial obstruction ([address]     K56.51            Patient Active Problem List   Diagnosis    Essential hypertension    Bradycardia    Nocturnal hypoxemia due to emphysema (HCC)    Obstructive sleep apnea treated with continuous positive airway pressure (CPAP)    Centrilobular emphysema ([address]    Gastroesophageal reflux disease with esophagitis    Vitreous degeneration    Presbyopia    After-cataract    Monocytosis    Esophageal stenosis    Chronic bilateral low back pain with left-sided sciatica    Right anterior knee pain    Valvular heart disease    Pulmonary hypertension ([address]    Enlarged aorta ([address]    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    Intestinal obstruction ([facility]        ASSESSMENT of Current Deficits Patient exhibits decreased strength, balance, and endurance impairing functional mobility, transfers, gait distance, and tolerance to activity. Patient on 4L and able to maintain his SPO2 90 -91% during ambulation(cueing for pursed lip breathing) and once sitting in the chair he was 93%. Pt displays impaired endurance and shortness of breath with exertion but no loss of balance or unsteadiness noted during ambulation using a wheeled walker. Patient has full flight of stairs to perform at home. Perform stairs when appropriate before  discharge. The patient will benefit from continued skilled therapy to increase endurance and improve balance for safe functional mobility, to decrease risk of falls, and to meet goals at discharge. PHYSICAL THERAPY  PLAN OF CARE       Physical therapy plan of care is established based on physician order,  patient diagnosis and clinical assessment    Current Treatment Recommendations:    -Bed Mobility: Lower extremity exercises  and Upper extremity exercises   -Sitting Balance: Incorporate reaching activities to activate trunk muscles   -Standing Balance: Perform strengthening exercises in standing to promote motor control with or without upper extremity support   -Transfers: Provide instruction on proper hand and foot position for adequate transfer of weight onto lower extremities and use of gait device if needed and Cues for hand placement, technique and safety.  Provide stabilization to prevent fall   -Gait: Gait training and Standing activities to improve: base of support, weight shift, weight bearing    -Endurance: Utilize Supervised activities to increase level of endurance to allow for safe functional mobility including transfers and gait   -Stairs: Stair training with instruction on proper technique and hand placement on rail    PT long term treatment goals are located in below grid    Patient and or family understand(s) diagnosis, prognosis, and plan of care. Frequency of treatments: Patient will be seen  daily. 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 D'Amico Pathology invasive moderately differentiated  adenocarcinoma arising in association with tubulovillous adenoma 5 lymph nodes removed and negative for metastatic carcinoma    Pulmonary hypertension ([facility] 1/10/2020    Echo Dr Jhonathan Henderson 12/30/19 SR, Left ventricular size is normal, mild concentric LVH, EF 55 - 94% Diastolic filling pattern indicates impaired relaxation, left atrium mildly dilated, mild AV sclerosis without stenosis, mild mitral regurgitation, mild tricuspid regurgitation, mild to moderate pulmonary hypertension, no pericardial effusion.     Sleep apnea     Tick bite of abdomen 4/8/2019    Valvular heart disease 1/10/2020    Echo Dr Jhonathan Henderson 12/30/19 SR, Left ventricular size is normal, mild concentric LVH, EF 55 - 11% Diastolic filling pattern indicates impaired relaxation, left atrium mildly dilated, mild AV sclerosis without stenosis, mild mitral regurgitation, mild tricuspid regurgitation, mild to moderate pulmonary hypertension, no pericardial effusion. See scanned echo     Past Surgical History:   Procedure Laterality Date    CARDIAC CATHETERIZATION      COLECTOMY N/A 11/13/2022    DIAGNOSTIC LAPAROSCOPY WITH POSSIBLE BOWEL RESECTION LAPAROSCOPIC performed by Matilde Montelongo MD at [facility] Left 03/2022    HEMORRHOID SURGERY      SPLENECTOMY      TONSILLECTOMY AND ADENOIDECTOMY         SUBJECTIVE:    Precautions: Up as tolerated, falls , 4L O2 (no O2 at home)    Social history: Patient lives alone in a two story home bedroom and bathroom 2nd floor full flight stairs with Rail  with 5 steps  to enter with [employer] in shower with 4 inch step over grab bars    Equipment owned: [address] [facility] [place],      [address] [place] [address]   How much difficulty turning over in bed?: None  How much difficulty sitting down on / standing up from a chair with arms?: A Little  How much difficulty moving from lying on back to sitting on side of bed?: None  How much help from another person moving to and from a bed to a chair?: A Little  How much help from another person needed to walk in hospital room?: A Little  How much help from another person for climbing 3-5 steps with a railing?: A Little  AM-PAC Inpatient Mobility Raw Score : 20  AM-PAC Inpatient T-Scale Score : 47.67  Mobility Inpatient CMS 0-100% Score: 35.83  Mobility Inpatient CMS G-Code Modifier : [address] cleared patient for PT treatment. OBJECTIVE;   Initial Evaluation  Date: 11/16/2022 Treatment Date:  11/17/2022       Short Term/ Long Term   Goals   Was pt agreeable to Eval/treatment?  Yes yes To be met in 4 days   Pain level   0/10   No number given but patient c/o pain with coughing    Bed Mobility    Rolling: Not assessed patient in chair    Supine to sit: Not assessed patient in chair    Sit to supine: Not assessed patient in chair    Scooting: Not assessed patient in chair   Rolling: Not assessed patient in chair   Supine to sit: Not assessed patient in chair   Sit to supine: Independent   Scooting: Not assessed patient in chair    Rolling: Independent    Supine to sit:  Independent    Sit to supine: Independent    Scooting: Independent     Transfers Sit to stand: Supervision  cues for hand placement Sit to stand: Supervision  Cues for hand placement and safety     Sit to stand: Independent    Ambulation     50 feet using  wheeled walker with Minimal assist of 1   for walker control, balance, and upright and cues for upright posture, safety, and pacing 2 x 110 feet using  wheeled walker with Supervision    cues for upright posture, safety, pacing, and pursed lip breathing     100 feet using  no device with Independent    Stair negotiation: ascended and descended   Not assessed     10 steps with HR and independence    ROM Within functional limits    Increase range of motion 10% of affected joints    Strength BUE:  refer to OT eval  RLE:  4+/5  LLE:  4+/5  Increase strength in affected mm groups by 1/3 grade   Balance Sitting EOB:  not assessed   Dynamic Standing:  fair + wheeled walker   Sitting EOB: good   Dynamic Standing: fair + wheeled walker   Sitting EOB:  good   Dynamic Standing: good      Patient is Alert & Oriented x person, place, time, and situation and follows directions    Sensation:  Patient  denies numbness/tingling   Edema:  no   Endurance: fair  +    Vitals:  4 liters nasal cannula   Blood Pressure at rest  Blood Pressure during session    Heart Rate at rest  Heart Rate during session    SPO2 at rest 95%  SPO2 during session 90-91%     Patient education  Patient educated on role of Physical Therapy, risks of immobility, safety and plan of care,  importance of mobility while in hospital , purse lip breathing, ankle pumps, quad set and glut set for edema control, blood clot prevention, and safety      Patient response to education:   Pt verbalized understanding Pt demonstrated skill Pt requires further education in this area   Yes Partial Yes      Treatment:  Patient practiced and was instructed/facilitated in the following treatment: Patient in chair. Pt educated on pursed lip breathing and pacing. 16  Pt stood from chair, cues for hand placement. Pt ambulated in the hallway and back to the chair. Pt performed seated exercises and wanted to get back in to the bed. Pt returned to bed independently. Therapeutic Exercises:  ankle pumps, hip abduction/adduction, long arc quad, and seated marching,  x 15 reps. AROM    At end of session, patient in bed with  call light and phone within reach,  all lines and tubes intact, nursing notified. Patient would benefit from continued skilled Physical Therapy to improve functional independence and quality of life. Patient's/ family goals   home    Time in 09:00  Time out 09:26    Total Treatment Time  26 minutes        CPT codes:    Therapeutic activities (38455)   16 minutes  1 unit(s)  Therapeutic exercises (82895)   10 minutes  1 unit(s)    Surjit Ponce  [facility]  LIC # [id number]

## 2022-11-17 NOTE — CONSULTS
[address]                                  CONSULTATION    PATIENT NAME: Tiki Castañeda                  :        1950  MED REC NO:   [id number]                            ROOM:       4836  ACCOUNT NO:   [de-identified]                           ADMIT DATE: 2022  PROVIDER:     Della Mejia MD    CONSULT DATE:  2022    HISTORY OF PRESENT ILLNESS:  The patient is a 72-year-old gentleman  known to me from before. He was diagnosed with nonischemic cardiomyopathy in the past.  He had  cardiac catheterization showing patent coronary vessels with ejection  fraction around 40% to 45%. He was started on ACE inhibitor. He could  not tolerate beta-blocker due to side effects. He had improvement in  his left ventricular systolic function over the last 16 years. He had  echocardiogram done in my office in April of this year showing ejection  fraction up to about 55%. He has also problem with paroxysmal atrial fibrillation. He was  maintaining sinus rhythm. He refused to be on anticoagulation therapy  in the past.    He was in sinus rhythm when I saw him early this year in my office. The patient was admitted this time to the hospital mainly with abdominal  pain and he was felt to have small bowel obstruction. He ended up having surgery over the weekend. He apparently went into atrial fibrillation with rapid ventricular  response postop. Cardiac consult was requested. The patient was started on amiodarone drip. He did convert to sinus  rhythm when I came to see him today. He was in sinus rhythm with  controlled heart rate. The patient does not recall having any recent complaints of palpitation. Indeed, he did not feel palpitation even when he was in rapid atrial  fibrillation this admission. The patient was lying flat in bed. He had NG tube in place.   His wife  was next to him.  He did not appear in acute distress. PAST MEDICAL HISTORY:  As above, plus history of hypertension. COPD from  tobacco abuse. Acid reflux. History of colon cancer and he underwent  surgery for that. Emphysema. Macular degeneration. Neuropathy. HABITS:  He has long history of heavy smoking. He used to drink alcohol  more than normal in the past, but he cut down significantly. REVIEW OF SYSTEMS:  CONSTITUTIONAL:  No fever or chills. HEENT:  No nosebleed. No hearing problem. No double vision. No  stridor. No hoarseness of the voice. CARDIAC:  No recent chest pain. No palpitation. PULMONARY:  Dyspnea with a lot of activities. No orthopnea or PND. GASTROENTEROLOGY:  Abdominal pain on admission. Small bowel obstruction  requiring surgery. GENITOURINARY:  No dysuria or frequency. No bladder or kidney tumor. NEUROLOGIC:  No clear history of seizure or stroke. No syncope. HEMATOLOGY:  No bleeding disorder. No adenopathy. ENDOCRINOLOGY:  No polyuria or polydipsia. SKIN:  No skin disorder. MUSCULOSKELETAL: The patient has some arthritis. PSYCH:  No depression or suicidal ideation. PHYSICAL EXAMINATION:  GENERAL: The patient was lying flat, in no acute distress. He has NG  tube in place. VITAL SIGNS:  Blood pressure 131/65, pulse 70, temperature 98.2. NECK: No JVD. HEART: Regular S1 and S2. No S3.  1/6 systolic murmur heard best at the  left sternal border. LUNGS: Slightly diminished breath sounds in the bases bilaterally. I  could not hear rales or wheezing. ABDOMEN:  Nondistended. EXTREMITIES:  No edema, cyanosis, or clubbing. NEURO:  Alert and awake with no focal deficits. IMAGING: EKG was found to showing atrial fibrillation with rapid  ventricular response with heart rate around 180 per minute with Q-waves  in the inferior leads and nonspecific ST-T wave changes.     Repeat EKG showing sinus rhythm with Q-waves in the inferior leads with  poor R-wave progression in leads V1-V3 with nonspecific ST-T wave  changes. LABS: On admission - WBC 14.2, hemoglobin 14.7, platelet count 122. Sodium 137, potassium 4.7, BUN 17, creatinine 0.9. Troponin 21. Lactic  acid is normal.  Repeat troponin is 21. IMPRESSION:  1. New-onset persistent atrial fibrillation. The patient was in sinus rhythm on admission. He went into atrial fibrillation with rapid ventricular response postop,  after surgery for small bowel obstruction. This atrial fibrillation was probably triggered by the stress of the  surgery. The patient was started on amiodarone drip and he did convert to sinus  rhythm. I will consider amiodarone p.o. now if he can tolerate p.o. medicines  and I will keep him on amiodarone drip at least until tomorrow as I am  not sure if he is absorbing his p.o. medicines. Hopefully, we will be able to stop the amiodarone drip in the next 3-4  weeks as I do not want him to stay on it in view of his problem with  COPD from smoking in the past.  Interestingly, the patient had one episodes of atrial fibrillation in  the past, but he was in sinus rhythm since then and he refused to be on  anticoagulation therapy. We may consider oral anticoagulation therapy after he recovers from this  surgery as he has high CHADS vascular score in view of his age and  hypertension.         Cherry Ray MD    D: 11/16/2022 15:01:25       T: 11/16/2022 15:09:11     [id number]  Job#: [id number]     Doc#: [id number]    CC:

## 2022-11-17 NOTE — CARE COORDINATION
11/17/2022 1440 CM met with pt at the bedside for transition of care needs at d/c. Pt plan is to return home alone as prior. Pt is independent and drives. Pt not receptive to having HHC. Pt's amiodarone drip and N/G tube have been d/c'ed. Pt doesn't wear home O2 but wearing 2 liters here, nursing is doing a walk test to qualify. If pt qualifies for home O2 he wants it through [facility] where he got his C-pap machine. Pt is a smoker and has been advised not to smoke in the house or anywhere around the oxygen,pt states that he's going to quit and doesn't smoke in the house. Pt's friend Jesus will provide transportation. CM will follow.  Electronically signed by Kirby Gamble RN on 11/17/2022 at 2:58 PM

## 2022-11-17 NOTE — PROGRESS NOTES
Nutrition Assessment     Type and Reason for Visit: Initial, RD Nutrition Re-Screen/LOS    Nutrition Recommendations/Plan: Will monitor     Malnutrition Assessment:  Malnutrition Status:  No Malnutrition    Nutrition Assessment:  Pt admits w/ SBO w/ PMH of COPD, A-Fib. Diet initiated, tolerating w/ good po intakes. Will monitor       Nutrition Related Findings:   A/ox4, abd soft/round/distended, BUN(H), -I/O -3.4L Wound Type: None    Current Nutrition Therapies:    ADULT DIET;  Regular    Anthropometric Measures:  Height: 6' (182.9 cm)  Current Body Wt: 172 lb (78 kg)   BMI: 23.3    Nutrition Diagnosis:   No nutrition diagnosis at this time     Nutrition Interventions:   Food and/or Nutrient Delivery: Continue Current Diet  Nutrition Education/Counseling: No recommendation at this time  Coordination of Nutrition Care: Continue to monitor while inpatient       Goals:     Goals: Meet at least 75% of estimated needs       Nutrition Monitoring and Evaluation:   Behavioral-Environmental Outcomes: None Identified  Food/Nutrient Intake Outcomes: Food and Nutrient Intake  Physical Signs/Symptoms Outcomes: Biochemical Data, GI Status, Fluid Status or Edema, Nutrition Focused Physical Findings, Skin, Weight    Discharge Planning:          Aidan Blair RD, LD  Contact: [phone]

## 2022-11-17 NOTE — PROGRESS NOTES
Department of Internal Medicine  General Internal Medicine  Attending Progress Note  Chief Complaint   Patient presents with    Abdominal Pain     RT MID FLANK STARTED NAUSEA WITH DRY HEAVES/ DENIES DIARRHEA     SUBJECTIVE:    Patient reports that he is doing better. No fever no chills no nausea or vomiting. NG tube has been removed. He is aware of plans to continue current medication. He is on amiodarone. This is being managed by cardiology. He is also on nasal cannula supplemental oxygen. He noted occasional cough with blood-streaked sputum.     OBJECTIVE      Medications    Current Facility-Administered Medications: oxyCODONE (ROXICODONE) immediate release tablet 5 mg, 5 mg, Oral, Q4H PRN **OR** oxyCODONE (ROXICODONE) immediate release tablet 10 mg, 10 mg, Oral, Q4H PRN  morphine (PF) injection 2 mg, 2 mg, IntraVENous, Q2H PRN **OR** [DISCONTINUED] morphine injection 4 mg, 4 mg, IntraVENous, Q2H PRN  metoprolol tartrate (LOPRESSOR) tablet 50 mg, 50 mg, Oral, BID  amiodarone (CORDARONE) tablet 200 mg, 200 mg, Oral, BID  metoprolol (LOPRESSOR) injection 5 mg, 5 mg, IntraVENous, PRN  enoxaparin (LOVENOX) injection 80 mg, 80 mg, SubCUTAneous, BID  perflutren lipid microspheres (DEFINITY) injection 1.5 mL, 1.5 mL, IntraVENous, ONCE PRN  perflutren lipid microspheres (DEFINITY) injection 1.5 mL, 1.5 mL, IntraVENous, ONCE PRN  Benzocaine-Menthol (CEPACOL) 1 lozenge, 1 lozenge, Oral, Q2H PRN  cefTRIAXone (ROCEPHIN) 1,000 mg in sterile water 10 mL IV syringe, 1,000 mg, IntraVENous, Q24H  metronidazole (FLAGYL) 500 mg in 0.9% NaCl 100 mL IVPB premix, 500 mg, IntraVENous, Q8H  Arformoterol Tartrate (BROVANA) nebulizer solution 15 mcg, 15 mcg, Nebulization, BID **AND** ipratropium (ATROVENT) 0.02 % nebulizer solution 0.5 mg, 0.5 mg, Nebulization, 4x daily  lidocaine (XYLOCAINE) 2 % jelly, , Topical, Once  sodium chloride flush 0.9 % injection 10 mL, 10 mL, IntraVENous, 2 times per day  sodium chloride flush 0.9 % injection 10 mL, 10 mL, IntraVENous, PRN  0.9 % sodium chloride infusion, , IntraVENous, PRN  ondansetron (ZOFRAN-ODT) disintegrating tablet 4 mg, 4 mg, Oral, Q8H PRN **OR** ondansetron (ZOFRAN) injection 4 mg, 4 mg, IntraVENous, Q6H PRN  albuterol sulfate HFA (PROVENTIL;VENTOLIN;PROAIR) 108 (90 Base) MCG/ACT inhaler 2 puff, 2 puff, Inhalation, Q6H PRN  pantoprazole (PROTONIX) 40 mg in sodium chloride (PF) 0.9 % 10 mL injection, 40 mg, IntraVENous, Daily  labetalol (NORMODYNE;TRANDATE) injection 10 mg, 10 mg, IntraVENous, Q30 Min PRN  Physical    VITALS:  /75   Pulse 77   Temp 98.2 °F (36.8 °C) (Oral)   Resp 18   Ht 6' (1.829 m)   Wt 172 lb (78 kg)   SpO2 94%   BMI 23.33 kg/m²   CONSTITUTIONAL:  awake, cooperative, and no apparent distress  EYES:  extra-ocular muscles intact  ENT:  normocepalic, without obvious abnormality  NECK:  supple, symmetrical, trachea midline  LUNGS:  no increased work of breathing, no retractions, and no crackles or wheezing  CARDIOVASCULAR:  normal apical pulses and normal S1 and S2  ABDOMEN:  normal bowel sounds, non-distended, and non-tender  MUSCULOSKELETAL:  full range of motion noted  NEUROLOGIC:  Mental Status Exam:  Level of Alertness:   awake  Orientation:   person, place, time  SKIN:  no bruising or bleeding  Data    CBC:   Lab Results   Component Value Date/Time    WBC 10.9 11/17/2022 05:44 AM    RBC 3.56 11/17/2022 05:44 AM    HGB 10.9 11/17/2022 05:44 AM    HCT 33.1 11/17/2022 05:44 AM    MCV 93.0 11/17/2022 05:44 AM    MCH 30.6 11/17/2022 05:44 AM    MCHC 32.9 11/17/2022 05:44 AM    RDW 14.1 11/17/2022 05:44 AM     11/17/2022 05:44 AM    MPV 10.0 11/17/2022 05:44 AM     BMP:    Lab Results   Component Value Date/Time     11/17/2022 05:44 AM    K 3.6 11/17/2022 05:44 AM     11/17/2022 05:44 AM    CO2 28 11/17/2022 05:44 AM    BUN 24 11/17/2022 05:44 AM    LABALBU 2.5 11/17/2022 05:44 AM    CREATININE 0.9 11/17/2022 05:44 AM    CALCIUM 8.2 11/17/2022 05:44 AM    GFRAA >60 03/30/2022 09:22 AM    LABGLOM >60 11/17/2022 05:44 AM    GLUCOSE 85 11/17/2022 05:44 AM       ASSESSMENT AND PLAN      SBO (small bowel obstruction): NG tube pulled out. Defer further management to general surgery. Intestinal obstruction:  Atrial fibrillation: Rate controlled currently on amiodarone. Continue to monitor. Defer management to cardiology. Hypertension essential: Continue current medication. Tobacco abuse: Counseled on importance of cessation. History of COPD: Currently not in exacerbation. Breathing treatments. Acute respiratory failure with hypoxia: Continue supplemental oxygen. We will do ambulatory pulse ox at the time of discharge to see if patient will benefit from home oxygen. Hypertensive episode: Resolved. KRYSTIN: Completely resolved. Discontinue IV fluid.

## 2022-11-17 NOTE — PROGRESS NOTES
Cardiology  Progress Note      SUBJECTIVE:  No chest pain. No dyspnea at rest.  NG tube was pulled out. Had small bowel movement last night.       Current Inpatient Medications  Current Facility-Administered Medications: oxyCODONE (ROXICODONE) immediate release tablet 5 mg, 5 mg, Oral, Q4H PRN **OR** oxyCODONE (ROXICODONE) immediate release tablet 10 mg, 10 mg, Oral, Q4H PRN  morphine (PF) injection 2 mg, 2 mg, IntraVENous, Q2H PRN **OR** [DISCONTINUED] morphine injection 4 mg, 4 mg, IntraVENous, Q2H PRN  metoprolol tartrate (LOPRESSOR) tablet 50 mg, 50 mg, Oral, BID  amiodarone (CORDARONE) tablet 200 mg, 200 mg, Oral, BID  metoprolol (LOPRESSOR) injection 5 mg, 5 mg, IntraVENous, PRN  enoxaparin (LOVENOX) injection 80 mg, 80 mg, SubCUTAneous, BID  perflutren lipid microspheres (DEFINITY) injection 1.5 mL, 1.5 mL, IntraVENous, ONCE PRN  perflutren lipid microspheres (DEFINITY) injection 1.5 mL, 1.5 mL, IntraVENous, ONCE PRN  Benzocaine-Menthol (CEPACOL) 1 lozenge, 1 lozenge, Oral, Q2H PRN  cefTRIAXone (ROCEPHIN) 1,000 mg in sterile water 10 mL IV syringe, 1,000 mg, IntraVENous, Q24H  metronidazole (FLAGYL) 500 mg in 0.9% NaCl 100 mL IVPB premix, 500 mg, IntraVENous, Q8H  Arformoterol Tartrate (BROVANA) nebulizer solution 15 mcg, 15 mcg, Nebulization, BID **AND** ipratropium (ATROVENT) 0.02 % nebulizer solution 0.5 mg, 0.5 mg, Nebulization, 4x daily  lidocaine (XYLOCAINE) 2 % jelly, , Topical, Once  sodium chloride flush 0.9 % injection 10 mL, 10 mL, IntraVENous, 2 times per day  sodium chloride flush 0.9 % injection 10 mL, 10 mL, IntraVENous, PRN  0.9 % sodium chloride infusion, , IntraVENous, PRN  ondansetron (ZOFRAN-ODT) disintegrating tablet 4 mg, 4 mg, Oral, Q8H PRN **OR** ondansetron (ZOFRAN) injection 4 mg, 4 mg, IntraVENous, Q6H PRN  albuterol sulfate HFA (PROVENTIL;VENTOLIN;PROAIR) 108 (90 Base) MCG/ACT inhaler 2 puff, 2 puff, Inhalation, Q6H PRN  pantoprazole (PROTONIX) 40 mg in sodium chloride (PF) 0.9 % 10 mL injection, 40 mg, IntraVENous, Daily  labetalol (NORMODYNE;TRANDATE) injection 10 mg, 10 mg, IntraVENous, Q30 Min PRN      Physical  VITALS:  /67   Pulse 56   Temp 98.4 °F (36.9 °C) (Oral)   Resp 17   Ht 6' (1.829 m)   Wt 172 lb (78 kg)   SpO2 94%   BMI 23.33 kg/m²   CURRENT TEMPERATURE:  Temp: 98.4 °F (36.9 °C)  CONSTITUTIONAL: No acute distress. EYES: Vision is intact. ENT: No sore throat. No ear drainage. NECK: No JVD. BACK: Symmetric. LUNGS:  diminished breath sounds right base and left base  CARDIOVASCULAR:  normal S1 and S2, no S3, and no S4  ABDOMEN:  non-distended  NEUROLOGIC: No focal deficits. EXTREMITIES: No edema cyanosis or clubbing. DATA:        Cardiology Labs:  BMP:    Lab Results   Component Value Date/Time     11/17/2022 05:44 AM    K 3.6 11/17/2022 05:44 AM     11/17/2022 05:44 AM    CO2 28 11/17/2022 05:44 AM    BUN 24 11/17/2022 05:44 AM     CBC:    Lab Results   Component Value Date/Time    WBC 10.9 11/17/2022 05:44 AM    RBC 3.56 11/17/2022 05:44 AM    HGB 10.9 11/17/2022 05:44 AM    HCT 33.1 11/17/2022 05:44 AM    MCV 93.0 11/17/2022 05:44 AM    RDW 14.1 11/17/2022 05:44 AM     11/17/2022 05:44 AM     PT/INR:  No results found for: PTINR  TROPONIN:  No components found for: TROP    ASSESSMENT    1.new onset persistent atrial fibrillation. Please refer to consult. Patient was started on amiodarone drip. He did convert to sinus rhythm. Echocardiogram showing normal left ventricular systolic function with right-sided enlargement. 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 Patient is supposed to get follow-up with his pulmonologist Dr. Vic Sam. We will keep patient on amiodarone p.o. at least the next 3 to 4 weeks and then will reevaluate his clinical status at the time.   Patient does not want to be on anticoagulation therapy for now as he feels this atrial fibrillation was triggered by the stress of his abdominal pain and surgery. 2.small bowel obstruction. Status post surgery. Patient is able to take p.o. medicines.

## 2022-11-17 NOTE — PROGRESS NOTES
GENERAL SURGERY  DAILY PROGRESS NOTE  11/17/2022    Subjective:  Passing gas having bowel movements. Tolerating clear liquid diet. Taking p.o. medication and off of all cardiac drips, only on 2 L nasal cannula    Objective:  /75   Pulse 77   Temp 98.2 °F (36.8 °C) (Oral)   Resp 18   Ht 6' (1.829 m)   Wt 172 lb (78 kg)   SpO2 94%   BMI 23.33 kg/m²     Gen: alert, oriented, no apparent distress  HEENT: NCAT, anicteric  CV: RR  Pulm: nonlabored breathing on 2 L nasal cannula  Abdomen: soft, minimally tender, nondistended, incision clean dry intact  Extremities: moving all extremities, no peripheral edema  Skin: warm and dry    Assessment/Plan:  67 y.o. male with resolving ileus status post diagnostic laparoscopy converted to open with small bowel resection on 11/13.   KRYSTIN resolving    Okay for regular diet   Stop IV fluids  And out of bed, ambulate as able  Appreciate cardiology recommendations  Dispo planning  Discussed with Dr. Esteban Turpin    Electronically signed by Chepe Alexander DO on 11/17/2022 at 12:47 PM   Doing well home likely tomorrow if tolerates diet still

## 2022-11-18 VITALS
OXYGEN SATURATION: 95 % | DIASTOLIC BLOOD PRESSURE: 67 MMHG | HEART RATE: 68 BPM | TEMPERATURE: 98.2 F | WEIGHT: 172 LBS | SYSTOLIC BLOOD PRESSURE: 116 MMHG | HEIGHT: 72 IN | RESPIRATION RATE: 18 BRPM | BODY MASS INDEX: 23.3 KG/M2

## 2022-11-18 LAB
ALBUMIN SERPL-MCNC: 2.6 G/DL (ref 3.5–5.2)
ALP BLD-CCNC: 73 U/L (ref 40–129)
ALT SERPL-CCNC: 14 U/L (ref 0–40)
ANION GAP SERPL CALCULATED.3IONS-SCNC: 8 MMOL/L (ref 7–16)
AST SERPL-CCNC: 16 U/L (ref 0–39)
BASOPHILS ABSOLUTE: 0 E9/L (ref 0–0.2)
BASOPHILS RELATIVE PERCENT: 0.4 % (ref 0–2)
BILIRUB SERPL-MCNC: 0.3 MG/DL (ref 0–1.2)
BUN BLDV-MCNC: 19 MG/DL (ref 6–23)
CALCIUM SERPL-MCNC: 8.1 MG/DL (ref 8.6–10.2)
CHLORIDE BLD-SCNC: 105 MMOL/L (ref 98–107)
CO2: 27 MMOL/L (ref 22–29)
CREAT SERPL-MCNC: 0.9 MG/DL (ref 0.7–1.2)
EOSINOPHILS ABSOLUTE: 0 E9/L (ref 0.05–0.5)
EOSINOPHILS RELATIVE PERCENT: 0.4 % (ref 0–6)
GFR SERPL CREATININE-BSD FRML MDRD: >60 ML/MIN/1.73
GLUCOSE BLD-MCNC: 91 MG/DL (ref 74–99)
HCT VFR BLD CALC: 34.2 % (ref 37–54)
HEMOGLOBIN: 11.2 G/DL (ref 12.5–16.5)
LYMPHOCYTES ABSOLUTE: 0.45 E9/L (ref 1.5–4)
LYMPHOCYTES RELATIVE PERCENT: 4.4 % (ref 20–42)
MCH RBC QN AUTO: 30.5 PG (ref 26–35)
MCHC RBC AUTO-ENTMCNC: 32.7 % (ref 32–34.5)
MCV RBC AUTO: 93.2 FL (ref 80–99.9)
MONOCYTES ABSOLUTE: 1.68 E9/L (ref 0.1–0.95)
MONOCYTES RELATIVE PERCENT: 14.9 % (ref 2–12)
NEUTROPHILS ABSOLUTE: 9.07 E9/L (ref 1.8–7.3)
NEUTROPHILS RELATIVE PERCENT: 80.7 % (ref 43–80)
PDW BLD-RTO: 14 FL (ref 11.5–15)
PLATELET # BLD: 390 E9/L (ref 130–450)
PMV BLD AUTO: 9.9 FL (ref 7–12)
POIKILOCYTES: ABNORMAL
POLYCHROMASIA: ABNORMAL
POTASSIUM REFLEX MAGNESIUM: 3.6 MMOL/L (ref 3.5–5)
RBC # BLD: 3.67 E12/L (ref 3.8–5.8)
SODIUM BLD-SCNC: 140 MMOL/L (ref 132–146)
TARGET CELLS: ABNORMAL
TOTAL PROTEIN: 5 G/DL (ref 6.4–8.3)
WBC # BLD: 11.2 E9/L (ref 4.5–11.5)

## 2022-11-18 PROCEDURE — 6370000000 HC RX 637 (ALT 250 FOR IP): Performed by: SPECIALIST

## 2022-11-18 PROCEDURE — 85025 COMPLETE CBC W/AUTO DIFF WBC: CPT

## 2022-11-18 PROCEDURE — 99024 POSTOP FOLLOW-UP VISIT: CPT | Performed by: SURGERY

## 2022-11-18 PROCEDURE — 99232 SBSQ HOSP IP/OBS MODERATE 35: CPT | Performed by: INTERNAL MEDICINE

## 2022-11-18 PROCEDURE — 36415 COLL VENOUS BLD VENIPUNCTURE: CPT

## 2022-11-18 PROCEDURE — 80053 COMPREHEN METABOLIC PANEL: CPT

## 2022-11-18 PROCEDURE — 2500000003 HC RX 250 WO HCPCS: Performed by: SURGERY

## 2022-11-18 PROCEDURE — 2580000003 HC RX 258: Performed by: STUDENT IN AN ORGANIZED HEALTH CARE EDUCATION/TRAINING PROGRAM

## 2022-11-18 PROCEDURE — 6360000002 HC RX W HCPCS: Performed by: STUDENT IN AN ORGANIZED HEALTH CARE EDUCATION/TRAINING PROGRAM

## 2022-11-18 PROCEDURE — 6370000000 HC RX 637 (ALT 250 FOR IP): Performed by: SURGERY

## 2022-11-18 RX ORDER — AMIODARONE HYDROCHLORIDE 200 MG/1
200 TABLET ORAL 2 TIMES DAILY
Qty: 60 TABLET | Refills: 0 | Status: SHIPPED | OUTPATIENT
Start: 2022-11-18

## 2022-11-18 RX ORDER — OXYCODONE HYDROCHLORIDE 5 MG/1
5 TABLET ORAL EVERY 6 HOURS PRN
Qty: 16 TABLET | Refills: 0 | Status: SHIPPED | OUTPATIENT
Start: 2022-11-18 | End: 2022-11-23

## 2022-11-18 RX ORDER — PANTOPRAZOLE SODIUM 40 MG/1
40 TABLET, DELAYED RELEASE ORAL
Status: DISCONTINUED | OUTPATIENT
Start: 2022-11-18 | End: 2022-11-18 | Stop reason: HOSPADM

## 2022-11-18 RX ORDER — METOPROLOL TARTRATE 50 MG/1
50 TABLET, FILM COATED ORAL 2 TIMES DAILY
Qty: 60 TABLET | Refills: 3 | Status: SHIPPED | OUTPATIENT
Start: 2022-11-18

## 2022-11-18 RX ADMIN — METRONIDAZOLE 500 MG: 500 INJECTION, SOLUTION INTRAVENOUS at 02:12

## 2022-11-18 RX ADMIN — AMIODARONE HYDROCHLORIDE 200 MG: 200 TABLET ORAL at 09:10

## 2022-11-18 RX ADMIN — Medication 10 ML: at 09:11

## 2022-11-18 RX ADMIN — PANTOPRAZOLE SODIUM 40 MG: 40 TABLET, DELAYED RELEASE ORAL at 09:10

## 2022-11-18 RX ADMIN — METOPROLOL TARTRATE 50 MG: 50 TABLET, FILM COATED ORAL at 09:10

## 2022-11-18 RX ADMIN — ENOXAPARIN SODIUM 80 MG: 100 INJECTION SUBCUTANEOUS at 09:10

## 2022-11-18 NOTE — CARE COORDINATION
11/18/2022 1015 Pt plan is to return home alone as prior. Pt hopes to discharge today. Pt is independent and drives. Pt does not want HHC. Per walk test pt did not qualify for home O2, he maintained above 90% for the entire walk and ambulated 200 ft. Pt is a smoker and states he's going to quit. Pt has a C-pap from [facility]. Pt's friend Jesus will provide transportation home.   Electronically signed by Charm Hashimoto, RN on 11/18/2022 at 10:36 AM

## 2022-11-18 NOTE — PROGRESS NOTES
CLINICAL PHARMACY NOTE: MEDS TO BEDS    Total # of Prescriptions Filled: 3   The following medications were delivered to the patient:  Amiodarone 200 mg  Metoprolol tartrate 50 mg  Oxycodone 5 mg    Additional Documentation:

## 2022-11-18 NOTE — PROGRESS NOTES
OT SESSION ATTEMPT     Date:2022  Patient Name: Arabella Fine  MRN: [id number]  : 1950  Room: [facility]     Attempted OT session this date:    [] unavailable due to other medical staff currently with pt   [] unavailable per nursing staff recommendation    [] Unavailable per nursing staff secondary to lab / radiology results    [x] Pt declined due to fatigue in the AM and in PM due to waiting for DC. Benefits of participation in therapy reviewed with pt.    [] off unit   [] Other:     Will reattempt OT evaluation and/or treatment at a later time.     Jean-Pierre Norman OTR/L; [id number]

## 2022-11-18 NOTE — PROGRESS NOTES
Patient 94% o2 saturation on room air while sitting. Ambulated patient, 200ft. o2 saturation maintained >90%. No oxygen administered.   Patient tolerated well

## 2022-11-18 NOTE — PROGRESS NOTES
GENERAL SURGERY  DAILY PROGRESS NOTE  11/18/2022    Subjective:  Feels well was off O2 yesterday required being placed back on 1 L nasal cannula overnight. Tolerated a regular diet yesterday without nausea or vomiting. Endorses having multiple bowel movements. Objective:  /73   Pulse 57   Temp 98.1 °F (36.7 °C) (Oral)   Resp 18   Ht 6' (1.829 m)   Wt 172 lb (78 kg)   SpO2 95%   BMI 23.33 kg/m²     Gen: alert, oriented, no apparent distress  HEENT: NCAT, anicteric  CV: RR  Pulm: nonlabored breathing on 1 L nasal cannula  Abdomen: Soft, nontender, nondistended, incision clean dry intact  Extremities: moving all extremities, no peripheral edema  Skin: warm and dry    Assessment/Plan:  67 y.o. male with small bowel obstruction status post diagnostic laparoscopy converted to open with small bowel resection 11/13 who developed postoperative KRYSTIN and ileus which is now resolved. .      Regular diet  Stop antibiotics  Stop IV pain meds  Transition Protonix to p.o.   Recommend smoking cessation  And out of bed, ambulate as able  Appreciate cardiology recommendations  [facility] 20/24  Likely home today if okay with all consultants  Plan will be discussed with Dr. Gallagher Staff    Electronically signed by Yosi Middleton MD on 11/18/2022 at 5:47 AM       As above

## 2022-11-18 NOTE — PROGRESS NOTES
Department of Internal Medicine  General Internal Medicine  Attending Progress Note  Chief Complaint   Patient presents with    Abdominal Pain     RT MID FLANK STARTED NAUSEA WITH DRY HEAVES/ DENIES DIARRHEA     SUBJECTIVE:    Patient was seen and examined. He denied fever and chills. No chest pain. No nausea or vomiting. Noted that he had a bowel movement this morning.      OBJECTIVE      Medications    Current Facility-Administered Medications: pantoprazole (PROTONIX) tablet 40 mg, 40 mg, Oral, QAM AC  oxyCODONE (ROXICODONE) immediate release tablet 5 mg, 5 mg, Oral, Q4H PRN **OR** oxyCODONE (ROXICODONE) immediate release tablet 10 mg, 10 mg, Oral, Q4H PRN  metoprolol tartrate (LOPRESSOR) tablet 50 mg, 50 mg, Oral, BID  amiodarone (CORDARONE) tablet 200 mg, 200 mg, Oral, BID  metoprolol (LOPRESSOR) injection 5 mg, 5 mg, IntraVENous, PRN  enoxaparin (LOVENOX) injection 80 mg, 80 mg, SubCUTAneous, BID  perflutren lipid microspheres (DEFINITY) injection 1.5 mL, 1.5 mL, IntraVENous, ONCE PRN  perflutren lipid microspheres (DEFINITY) injection 1.5 mL, 1.5 mL, IntraVENous, ONCE PRN  Benzocaine-Menthol (CEPACOL) 1 lozenge, 1 lozenge, Oral, Q2H PRN  Arformoterol Tartrate (BROVANA) nebulizer solution 15 mcg, 15 mcg, Nebulization, BID **AND** ipratropium (ATROVENT) 0.02 % nebulizer solution 0.5 mg, 0.5 mg, Nebulization, 4x daily  lidocaine (XYLOCAINE) 2 % jelly, , Topical, Once  sodium chloride flush 0.9 % injection 10 mL, 10 mL, IntraVENous, 2 times per day  sodium chloride flush 0.9 % injection 10 mL, 10 mL, IntraVENous, PRN  0.9 % sodium chloride infusion, , IntraVENous, PRN  ondansetron (ZOFRAN-ODT) disintegrating tablet 4 mg, 4 mg, Oral, Q8H PRN **OR** ondansetron (ZOFRAN) injection 4 mg, 4 mg, IntraVENous, Q6H PRN  albuterol sulfate HFA (PROVENTIL;VENTOLIN;PROAIR) 108 (90 Base) MCG/ACT inhaler 2 puff, 2 puff, Inhalation, Q6H PRN  labetalol (NORMODYNE;TRANDATE) injection 10 mg, 10 mg, IntraVENous, Q30 Min PRN  Physical VITALS:  /67   Pulse 68   Temp 98.2 °F (36.8 °C)   Resp 18   Ht 6' (1.829 m)   Wt 172 lb (78 kg)   SpO2 95%   BMI 23.33 kg/m²   CONSTITUTIONAL:  awake, cooperative, and no apparent distress  EYES:  extra-ocular muscles intact and vision intact  ENT:  normocepalic, without obvious abnormality  NECK:  supple, symmetrical, trachea midline  LUNGS:  no increased work of breathing, no retractions, and diminished breath sounds throughout lungs  CARDIOVASCULAR:  normal apical pulses and normal S1 and S2  ABDOMEN:  normal bowel sounds, non-distended, and non-tender  MUSCULOSKELETAL:  there is no redness, warmth, or swelling of the joints  NEUROLOGIC:  Mental Status Exam:  Level of Alertness:   awake  Orientation:   person, place, time  SKIN:  no bruising or bleeding  Data    CBC:   Lab Results   Component Value Date/Time    WBC 11.2 11/18/2022 04:52 AM    RBC 3.67 11/18/2022 04:52 AM    HGB 11.2 11/18/2022 04:52 AM    HCT 34.2 11/18/2022 04:52 AM    MCV 93.2 11/18/2022 04:52 AM    MCH 30.5 11/18/2022 04:52 AM    MCHC 32.7 11/18/2022 04:52 AM    RDW 14.0 11/18/2022 04:52 AM     11/18/2022 04:52 AM    MPV 9.9 11/18/2022 04:52 AM     BMP:    Lab Results   Component Value Date/Time     11/18/2022 04:52 AM    K 3.6 11/18/2022 04:52 AM     11/18/2022 04:52 AM    CO2 27 11/18/2022 04:52 AM    BUN 19 11/18/2022 04:52 AM    LABALBU 2.6 11/18/2022 04:52 AM    CREATININE 0.9 11/18/2022 04:52 AM    CALCIUM 8.1 11/18/2022 04:52 AM    GFRAA >60 03/30/2022 09:22 AM    LABGLOM >60 11/18/2022 04:52 AM    GLUCOSE 91 11/18/2022 04:52 AM       ASSESSMENT AND PLAN      SBO (small bowel obstruction)  Intestinal obstruction  Atrial Fibrillation   Hx of COPD  Tobacco abuse  Hypertension Essential  Acute Respiratory failure with hypoxia ( Resolved)  KRYSTIN: resolved.   Hypotension Resolved    Plans:  Patient is to be discharged  Follow up with Gen surgery for post op evaluation and discussion of pathology report  Continue current home medications  Continue Amiodarone and follow up with cardiology    Discharge summary per Gen surgery team.

## 2022-11-18 NOTE — PROGRESS NOTES
Cardiology  Progress Note      SUBJECTIVE:  No chest pain. No dyspnea. Less abdominal pain. Current Inpatient Medications  Current Facility-Administered Medications: pantoprazole (PROTONIX) tablet 40 mg, 40 mg, Oral, QAM AC  oxyCODONE (ROXICODONE) immediate release tablet 5 mg, 5 mg, Oral, Q4H PRN **OR** oxyCODONE (ROXICODONE) immediate release tablet 10 mg, 10 mg, Oral, Q4H PRN  metoprolol tartrate (LOPRESSOR) tablet 50 mg, 50 mg, Oral, BID  amiodarone (CORDARONE) tablet 200 mg, 200 mg, Oral, BID  metoprolol (LOPRESSOR) injection 5 mg, 5 mg, IntraVENous, PRN  enoxaparin (LOVENOX) injection 80 mg, 80 mg, SubCUTAneous, BID  perflutren lipid microspheres (DEFINITY) injection 1.5 mL, 1.5 mL, IntraVENous, ONCE PRN  perflutren lipid microspheres (DEFINITY) injection 1.5 mL, 1.5 mL, IntraVENous, ONCE PRN  Benzocaine-Menthol (CEPACOL) 1 lozenge, 1 lozenge, Oral, Q2H PRN  Arformoterol Tartrate (BROVANA) nebulizer solution 15 mcg, 15 mcg, Nebulization, BID **AND** ipratropium (ATROVENT) 0.02 % nebulizer solution 0.5 mg, 0.5 mg, Nebulization, 4x daily  lidocaine (XYLOCAINE) 2 % jelly, , Topical, Once  sodium chloride flush 0.9 % injection 10 mL, 10 mL, IntraVENous, 2 times per day  sodium chloride flush 0.9 % injection 10 mL, 10 mL, IntraVENous, PRN  0.9 % sodium chloride infusion, , IntraVENous, PRN  ondansetron (ZOFRAN-ODT) disintegrating tablet 4 mg, 4 mg, Oral, Q8H PRN **OR** ondansetron (ZOFRAN) injection 4 mg, 4 mg, IntraVENous, Q6H PRN  albuterol sulfate HFA (PROVENTIL;VENTOLIN;PROAIR) 108 (90 Base) MCG/ACT inhaler 2 puff, 2 puff, Inhalation, Q6H PRN  labetalol (NORMODYNE;TRANDATE) injection 10 mg, 10 mg, IntraVENous, Q30 Min PRN      Physical  VITALS:  /67   Pulse 68   Temp 98.2 °F (36.8 °C)   Resp 18   Ht 6' (1.829 m)   Wt 172 lb (78 kg)   SpO2 95%   BMI 23.33 kg/m²   CURRENT TEMPERATURE:  Temp: 98.2 °F (36.8 °C)  CONSTITUTIONAL: No acute distress. EYES: Vision is intact. ENT: No sore throat.   No ear drainage. NECK: No JVD. BACK: Symmetric. LUNGS:  diminished breath sounds right base and left base  CARDIOVASCULAR:  normal S1 and S2, no S3, and no S4  ABDOMEN:  non-distended  NEUROLOGIC: No focal deficits. EXTREMITIES: No edema cyanosis or clubbing. DATA:      ECG:  I have reviewed EKG with the following interpretation:  normal sinus rhythm    Cardiology Labs:  BMP:    Lab Results   Component Value Date/Time     11/18/2022 04:52 AM    K 3.6 11/18/2022 04:52 AM     11/18/2022 04:52 AM    CO2 27 11/18/2022 04:52 AM    BUN 19 11/18/2022 04:52 AM     CBC:    Lab Results   Component Value Date/Time    WBC 11.2 11/18/2022 04:52 AM    RBC 3.67 11/18/2022 04:52 AM    HGB 11.2 11/18/2022 04:52 AM    HCT 34.2 11/18/2022 04:52 AM    MCV 93.2 11/18/2022 04:52 AM    RDW 14.0 11/18/2022 04:52 AM     11/18/2022 04:52 AM     PT/INR:  No results found for: PTINR  TROPONIN:  No components found for: TROP    ASSESSMENT      1.new onset persistent atrial fibrillation. Please refer to consult. Patient was started on amiodarone drip. He did convert to sinus rhythm. Echocardiogram is showing normal left ventricular systolic function with right-sided enlargement. 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 Patient is supposed to get follow-up with his pulmonologist Dr. Macey Martinez. We will keep patient on amiodarone p.o. at least the next 3 to 4 weeks and then will reevaluate his clinical status at the time. Patient does not want to be on anticoagulation therapy for now as he feels this atrial fibrillation was triggered by the stress of his abdominal pain and surgery. 2.small bowel obstruction. Status post surgery. Patient is able to take p.o. medicines.

## 2022-11-21 ENCOUNTER — CARE COORDINATION (OUTPATIENT)
Dept: CARE COORDINATION | Age: 72
End: 2022-11-21

## 2022-11-21 DIAGNOSIS — K56.609 SBO (SMALL BOWEL OBSTRUCTION) (HCC): Primary | ICD-10-CM

## 2022-11-21 PROCEDURE — 1111F DSCHRG MED/CURRENT MED MERGE: CPT | Performed by: NURSE PRACTITIONER

## 2022-11-21 NOTE — CARE COORDINATION
Called pt to schedule hospital follow up and pt declined to schedule at this time. Cristin Peace he has too many appointments right now.

## 2022-11-21 NOTE — CARE COORDINATION
[facility] Care Transitions Initial Follow Up Call    Call within 2 business days of discharge: Yes    Care Transition Nurse contacted the patient by telephone to perform post hospital discharge assessment. Verified name and  with patient as identifiers. Provided introduction to self, and explanation of the Care Transition Nurse role. Patient: Tanner Mackay Patient : 1950   MRN: <[id number]>  Reason for Admission: -22 SBO; S/P Laparotomy. Discharge Date: 22 RARS: Readmission Risk Score: 11.5      Last Discharge  Street       Date Complaint Diagnosis Description Type Department Provider    22 Abdominal Pain Intestinal obstruction, unspecified cause, unspecified whether partial or complete ([facility] [address] . .. ED to Hosp-Admission (Discharged) (ADMITTED) Kavita Hu MD; [facility]. .. Was this an external facility discharge? No Discharge Facility: [address] [place] [address] to be reviewed by the provider   Additional needs identified to be addressed with provider: No  none               Method of communication with provider: none. Spoke with Pt who reported last BM yesterday 22. Pt states that abdomen/incision site is sore/tender. Pt has not taken anything for pain since discharge. Pt states that he has been up moving around. H0zvrej CP, palpitations, SOB, cough, n/v/d, fever, chills. Pt stated that his appetite is decreased. Discussed advancing diet as tolerated. Start with light diet and progress to your normal diet as you feel like eating. If you experience nausea or repeated episodes of vomiting which persist beyond 12-24 hours, notify your doctor. Pt encouraged to eat small nutritious meals and to drink plenty of fluids. Pt v/u. Pt is monitoring BP and Oxygen saturation. Pt is declining RPM enrollment, stating that he has everything he needs to monitor himself at home. Pt states that incision looks good, denies redness, swelling, drainage.  Pt asking if he can get into Hot Tub, advised Pt okay to shower in 24 hours after procedure. No tub bathing, swimming or soaking for 2 weeks. Pt v/u. Medication Review completed, 1111F order placed. Post Op appt scheduled. CTN routed message High Priority to PCP [address] [place] [address] Staff to schedule 7 day Hospital F/U. Pt denies Transportation, Home, or Medication needs. CTN information given, will continue to follow. START taking:  amiodarone (CORDARONE)  metoprolol tartrate (LOPRESSOR)  oxyCODONE (ROXICODONE)  STOP taking:  ramipril 2.5 MG capsule (ALTACE)  Shingrix 50 MCG/0.5ML Susr injection (zoster  recombinant adjuvanted vaccine)    Care Transition Nurse reviewed discharge instructions, medical action plan, and red flags with patient who verbalized understanding. The patient was given an opportunity to ask questions and does not have any further questions or concerns at this time. Were discharge instructions available to patient? Yes. Reviewed appropriate site of care based on symptoms and resources available to patient including: PCP  Specialist  Urgent care clinics  When to call [address] The patient agrees to contact the PCP office for questions related to their healthcare. Advance Care Planning:   Does patient have an Advance Directive: reviewed and current. Medication reconciliation was performed with patient, who verbalizes understanding of administration of home medications. Medications reviewed, 1111F entered: yes    Was patient discharged with a pulse oximeter? no    Non-face-to-face services provided:  Scheduled appointment with PCP-routed message to PCP  staff to schedule 1 week f/u  Obtained and reviewed discharge summary and/or continuity of care documents    Offered patient enrollment in the Remote Patient Monitoring (RPM) program for in-home monitoring: Patient declined.     Care Transitions 24 Hour Call    Schedule Follow Up Appointment with PCP: Completed  Do you have a copy of your discharge instructions?: Yes  Do you have all of your prescriptions and are they filled?: Yes  Have you been contacted by a [address]?: No  Have you scheduled your follow up appointment?: Yes  How are you going to get to your appointment?: Car - drive self  Do you feel like you have everything you need to keep you well at home?: Yes  Care Transitions Interventions   Meds to Bed: Completed              Follow Up  Future Appointments   Date Time Provider Lauren Frye   11/29/2022  9:00 Raffi Novak MD [address] [place] [address]   2/7/2023  9:20 AM CAITLIN Camacho - CNP [facility]   11/14/2023  9:00 AM [facility]       Care Transition Nurse provided contact information. Plan for follow-up call in 5-7 days based on severity of symptoms and risk factors.   Plan for next call: symptom management-pain, wound,   self management-PRN pain medication, monitor wound for s/s infection  follow-up appointment-PCP, Gen Sx  medication management-taking as prescribed, changes    Balwinder Becerril LPN

## 2022-11-22 DIAGNOSIS — K56.609 SBO (SMALL BOWEL OBSTRUCTION) (HCC): Primary | ICD-10-CM

## 2022-11-22 RX ORDER — SULFAMETHOXAZOLE AND TRIMETHOPRIM 800; 160 MG/1; MG/1
1 TABLET ORAL 2 TIMES DAILY
Qty: 14 TABLET | Refills: 0 | Status: SHIPPED | OUTPATIENT
Start: 2022-11-22 | End: 2022-11-29

## 2022-11-22 NOTE — DISCHARGE SUMMARY
Physician Discharge Summary     Patient ID:  Mir Valenzuela  [id number]  75 y.o.  1950    Admit date: 11/11/2022    Discharge date and time: 11/22/2022    Admitting Physician: Damon Spencer MD     Admission Diagnoses:   Patient Active Problem List   Diagnosis    Essential hypertension    Bradycardia    Nocturnal hypoxemia due to emphysema [facility])    Obstructive sleep apnea treated with continuous positive airway pressure (CPAP)    Centrilobular emphysema (HCC)    Gastroesophageal reflux disease with esophagitis    Vitreous degeneration    Presbyopia    After-cataract    Monocytosis    Esophageal stenosis    Chronic bilateral low back pain with left-sided sciatica    Right anterior knee pain    Valvular heart disease    Pulmonary hypertension (HCC)    Enlarged aorta ([address]    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    Duke's esophagus determined by endoscopy    Seborrheic dermatitis of scalp    Solitary pulmonary nodule    Fatigue    Need for meningococcal vaccination    Need for shingles vaccine    Flu vaccine need    Personal history of tobacco use, presenting hazards to health    SBO (small bowel obstruction) ([address]    Intestinal obstruction ([address]       Discharge Diagnoses:   Patient Active Problem List   Diagnosis    Essential hypertension    Bradycardia    Nocturnal hypoxemia due to emphysema (HCC)    Obstructive sleep apnea treated with continuous positive airway pressure (CPAP)    Centrilobular emphysema (HCC)    Gastroesophageal reflux disease with esophagitis    Vitreous degeneration    Presbyopia    After-cataract    Monocytosis    Esophageal stenosis    Chronic bilateral low back pain with left-sided sciatica    Right anterior knee pain    Valvular heart disease    Pulmonary hypertension ([address]    Enlarged aorta ([address]    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 male who presented with nausea, vomiting, abdominal pain. CT abdomen pelvis revealed a small bowel obstruction. He had repeat CT with p.o. contrast that did not show any improvement and increased free fluid. He was taken to the operating room for laparoscopy converted to open small bowel resection and lysis of adhesions. He did well postoperatively, diet was advanced, they were ambulating independently and pain was controlled. He did have an episode of A. fib with RVR and was seen by cardiology and appropriately treated. They were discharged with appropriate medication, instructions and follow up. Consults: Internal medicine, cardiology    Significant Diagnostic Studies: As above    Treatments: As above    In process/preliminary results:  Outstanding Order Results       Date and Time Order Name Status Description    11/13/2022 12:00 AM Surgical Pathology In process             Patient Instructions:   Discharge Medication List as of 11/18/2022 11:25 AM        START taking these medications    Details   metoprolol tartrate (LOPRESSOR) 50 MG tablet Take 1 tablet by mouth 2 times daily, Disp-60 tablet, R-3Normal      amiodarone (CORDARONE) 200 MG tablet Take 1 tablet by mouth 2 times daily, Disp-60 tablet, R-0Normal      oxyCODONE (ROXICODONE) 5 MG immediate release tablet Take 1 tablet by mouth every 6 hours as needed for Pain for up to 5 days. , Disp-16 tablet, R-0Normal           CONTINUE these medications which have NOT CHANGED    Details   acetaminophen (TYLENOL) 500 MG tablet Take 500 mg by mouth every 6 hours as needed for PainHistorical Med Hyprom-Naphaz-Polysorb-Zn Sulf (CLEAR EYES COMPLETE OP) Apply to eyeHistorical Med      albuterol sulfate HFA (PROAIR HFA) 108 (90 Base) MCG/ACT inhaler Inhale 2 puffs into the lungs every 6 hours as needed for Wheezing, Disp-1 each, R-0Normal      omeprazole (PRILOSEC) 40 MG delayed release capsule Take 1 capsule by mouth daily, Disp-90 capsule, R-3Normal      baclofen (LIORESAL) 10 MG tablet Take 1 tablet by mouth nightly as needed (muscle spasms), Disp-90 tablet, R-0Normal      Omega-3 Fatty Acids (FISH OIL) 1000 MG CAPS Take 3,000 mg by mouth 3 times dailyHistorical Med      Multiple Vitamins-Minerals (ICAPS AREDS FORMULA PO) Take by mouthHistorical Med      Gluc-Chonn-MSM-Boswellia-Vit D (GLUCOSAMINE CHONDROITIN + D3 PO) Take 1 tablet by mouth dailyHistorical Med      ANORO ELLIPTA 62.5-25 MCG/INH AEPB inhaler Inhale 1 puff into the lungs daily One puff inhalation daily, Disp-3 each, R-3, DAWNormal           STOP taking these medications       ramipril (ALTACE) 2.5 MG capsule Comments:   Reason for Stopping:         zoster recombinant adjuvanted vaccine (SHINGRIX) 50 MCG/0.5ML SUSR injection Comments:   Reason for Stopping:               Discharge Exam:  General appearance: AAOx3, NAD  Head: NCAT, PERRLA, EOMI, red conjunctiva  Neck: supple, no masses  Lungs: Equal chest rise bilateral  Heart: Reg rate  Abdomen: soft, nondistended, tender appropriately, normotympanic, no guarding, no peritoneal signs, incision C/D/I  Extremities: extremities normal, atraumatic, no cyanosis or edema    Disposition: home    Patient Instructions:    Activity: no lifting, Driving, or Strenuous exercise for 4 weeks  Diet: regular diet  Wound Care: none needed    Follow-up with Dr Harper Session in 2 weeks   Signed:  Lia Pemberton MD  11/22/2022  5:32 PM

## 2022-11-29 ENCOUNTER — OFFICE VISIT (OUTPATIENT)
Dept: SURGERY | Age: 72
End: 2022-11-29

## 2022-11-29 ENCOUNTER — CARE COORDINATION (OUTPATIENT)
Dept: CARE COORDINATION | Age: 72
End: 2022-11-29

## 2022-11-29 VITALS — DIASTOLIC BLOOD PRESSURE: 68 MMHG | TEMPERATURE: 97.7 F | HEART RATE: 68 BPM | SYSTOLIC BLOOD PRESSURE: 114 MMHG

## 2022-11-29 DIAGNOSIS — Z09 POSTOPERATIVE EXAMINATION: ICD-10-CM

## 2022-11-29 DIAGNOSIS — C83.00 SMALL LYMPHOCYTIC LYMPHOMA (HCC): Primary | ICD-10-CM

## 2022-11-29 PROCEDURE — 99024 POSTOP FOLLOW-UP VISIT: CPT | Performed by: SURGERY

## 2022-11-29 NOTE — CARE COORDINATION
[facility] Care Transitions Follow Up Call    Care Transition Nurse contacted the patient by telephone to follow up after admission on 22. Verified name and  with patient as identifiers. Patient: Crow Jaffe  Patient : 1950   MRN: <[id number]>  Reason for Admission: -22 SBO; S/P Laparotomy. Discharge Date: 22 RARS: Readmission Risk Score: 11.5      Needs to be reviewed by the provider   Additional needs identified to be addressed with provider: No  none             Method of communication with provider: none. Spoke with Pt who states that he had f/u with Dr Cyndy Andrade today. Pt reports one small are in the incision that has opened; he states that Dr Cyndy Andrade told him to keep the area clean and to cover with sterile guaze dressing. Pt states that he is having 2-3 small formed BM's daily. Pt reports bloating after eating; Pt eating smaller meals and that seems to be helping. Pt denies abdominal pain/soreness. Pt denies Transportation, Home, or Medication needs. CTN information given, will continue to follow. Addressed changes since last contact:  none  Discussed follow-up appointments. If no appointment was previously scheduled, appointment scheduling offered: Yes. Is follow up appointment scheduled within 7 days of discharge? Yes. Follow Up  Future Appointments   Date Time Provider Lauren Frye   2022  8:45 AM Augusto Ramires MD [facility]   2023  9:20 AM Cheri Nichole Gowers, APRN - CNP [facility]   2023  9:00 AM Cheri Nichole Gowers, APRN - CNP [facility]     Non-[facility] follow up appointment(s):     Care Transition Nurse reviewed discharge instructions, medical action plan, and red flags with patient and discussed any barriers to care and/or understanding of plan of care after discharge. Discussed appropriate site of care based on symptoms and resources available to patient including: PCP  Specialist  When to call [address]  The patient agrees to contact the PCP office for questions related to their healthcare. Advance Care Planning:   reviewed and current. Patients top risk factors for readmission: medical condition-SBO  Interventions to address risk factors: Obtained and reviewed discharge summary and/or continuity of care documents    Offered patient enrollment in the Remote Patient Monitoring (RPM) program for in-home monitoring: Patient declined. Care Transitions Subsequent and Final Call    Schedule Follow Up Appointment with PCP: Completed  Subsequent and Final Calls  Do you have any ongoing symptoms?: Yes  Onset of Patient-reported symptoms: In the past 7 days  Patient-reported symptoms: Weakness, Other  Have your medications changed?: No  Do you have any questions related to your medications?: No  Do you currently have any active services?: No  Do you have any needs or concerns that I can assist you with?: No  Identified Barriers: None  Care Transitions Interventions   Meds to Bed: Completed     Other Interventions:             Care Transition Nurse provided contact information for future needs. Plan for follow-up call in 5-7 days based on severity of symptoms and risk factors.   Plan for next call: symptom management-wound care  self management-monitor s/s infection, drsg changes  medication management-taking as prescribed, changes    Sridevi Gonzalez LPN

## 2022-11-30 NOTE — PROGRESS NOTES
General Surgery Office Note  [facility] Surgery  Consandkendall Baker MD    Patient's Name/Date of Birth: Debbie Primer / 1950    Date: November 29, 2022     Surgeon: Meredith Baker MD    Chief Complaint:   Chief Complaint   Patient presents with    Post-Op Check     Lap w small bowel resection       Patient Active Problem List   Diagnosis    Essential hypertension    Bradycardia    Nocturnal hypoxemia due to emphysema [facility])    Obstructive sleep apnea treated with continuous positive airway pressure (CPAP)    Centrilobular emphysema (HCC)    Gastroesophageal reflux disease with esophagitis    Vitreous degeneration    Presbyopia    After-cataract    Monocytosis    Esophageal stenosis    Chronic bilateral low back pain with left-sided sciatica    Right anterior knee pain    Valvular heart disease    Pulmonary hypertension ([address]    Enlarged aorta ([address]    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 Last few days have been the worst. Some drainage from midline incision which has partially dehi    Objective:  /68   Pulse 68   Temp 97.7 °F (36.5 °C)   Labs:  No results for input(s): WBC, HGB, HCT in the last 72 hours. Invalid input(s): PLR  Lab Results   Component Value Date    CREATININE 0.9 11/18/2022    BUN 19 11/18/2022     11/18/2022    K 3.6 11/18/2022     11/18/2022    CO2 27 11/18/2022     No results for input(s): LIPASE, AMYLASE in the last 72 hours.       General appearance: AA, NAD  HEENT: NCAT, PERRLA, EOMI  Lungs: Clear, equal rise bilateral  Heart: Reg  Abdomen: soft, nondistended, nontender, midline incision with 1cm opening with serous drainage. no signs of infection, no cellulitis, no hematoma  Skin: No lesions, incisions well healed  Psych: No distress, conversive, no hallucinations  : No ulcers or lesions  Rectal: No bleeding    A complete 10 system review was performed and are otherwise negative unless mentioned in the above HPI. Specific negatives are listed below but may not include all those reviewed. General ROS: negative obtundation, AMS  ENT ROS: negative rhinorrhea, epistaxis  Allergy and Immunology ROS: negative itchy/watery eyes or nasal congestion  Hematological and Lymphatic ROS: negative spontaneous bleeding or bruising  Endocrine ROS: negative  lethargy, mood swings, palpitations or polydipsia/polyuria  Respiratory ROS: negative sputum changes, stridor, tachypnea or wheezing  Cardiovascular ROS: negative for - loss of consciousness, murmur or orthopnea  Gastrointestinal ROS: negative for - hematochezia or hematemesis  Genito-Urinary ROS: negative for -  genital discharge or hematuria  Musculoskeletal ROS: negative for - focal weakness, gangrene  Psych/Neuro ROS: negative for - visual or auditory hallucinations, suicidal ideation      Time spent reviewing past medical, surgical, social and family history, vitals, nursing assessment and images. Imaging: n/a    Pathology:   Diagnosis:   Small bowel, resection: Segments of small bowel with serosal adhesions,   submucosal edema and congestion. Negative for neoplasia. Attached lymph node: Small lymphocytic lymphoma.         See comment     Assessment/Plan:  Annetta Kolb is a 67 y.o. male 2 weeks s/p lap to open SAMI with SBR    Local wound care to midline  Has finished abx  Continue current bowel regimen  F/u in 2 weeks  Refer to oncology for path finding of small lymphocytic lymphoma    Physician Signature: Electronically signed by Dr. Kari Flores  11/29/2022

## 2022-12-02 DIAGNOSIS — J43.2 CENTRILOBULAR EMPHYSEMA (HCC): ICD-10-CM

## 2022-12-02 RX ORDER — UMECLIDINIUM BROMIDE AND VILANTEROL TRIFENATATE 62.5; 25 UG/1; UG/1
POWDER RESPIRATORY (INHALATION)
Qty: 3 EACH | Refills: 3 | Status: SHIPPED | OUTPATIENT
Start: 2022-12-02

## 2022-12-07 ENCOUNTER — CARE COORDINATION (OUTPATIENT)
Dept: CARE COORDINATION | Age: 72
End: 2022-12-07

## 2022-12-07 NOTE — CARE COORDINATION
[facility] Care Transitions Follow Up Call    Care Transition Nurse contacted the patient by telephone to follow up after admission on 22. Verified name and  with patient as identifiers. Patient: Mir Valenzuela  Patient : 1950   MRN: <[id number]>  Reason for Admission: -22 SBO; S/P Laparotomy. Discharge Date: 22 RARS: Readmission Risk Score: 11.5      Needs to be reviewed by the provider   Additional needs identified to be addressed with provider: No  none             Method of communication with provider: none. Spoke with Pt who reports continued small area of dehiscence; denies drainage, keeping cleaned and covered. Discussed s/s of infection. Pt v/u. Dr Billy Dominguez monitoring; Armando Soto 22. Pt saw Cardiologist and has Oncology appt scheduled 22. Pt reports appetite is good and is drinking plenty of fluids. Bowel/Bladder elimination normal. Denies abdominal pain/tenderness. Pt denies Transportation, Home, or Medication needs. CTN information given, will continue to follow. Addressed changes since last contact:  none  Discussed follow-up appointments. If no appointment was previously scheduled, appointment scheduling offered: Yes. Is follow up appointment scheduled within 7 days of discharge? Yes. Follow Up  Future Appointments   Date Time Provider Lauren Frye   2022  8:45 AM Damon Spencer MD [facility]   2023  9:20 AM CAITLIN Last CNP [facility]   2023  9:00 AM CAITLIN Bansal CNP [facility]     Non-[facility] follow up appointment(s):       Oncology (Tony) 22    Care Transition Nurse reviewed discharge instructions, medical action plan, and red flags with patient and discussed any barriers to care and/or understanding of plan of care after discharge.  Discussed appropriate site of care based on symptoms and resources available to patient including: PCP  Specialist  Urgent care clinics  When to call [employer]. The patient agrees to contact the PCP office for questions related to their healthcare. Advance Care Planning:   Not addressed . Patients top risk factors for readmission: medical condition-SBO, COPD, HTN, AFIB  Interventions to address risk factors: Obtained and reviewed discharge summary and/or continuity of care documents    Offered patient enrollment in the Remote Patient Monitoring (RPM) program for in-home monitoring: Patient declined. Care Transitions Subsequent and Final Call    Schedule Follow Up Appointment with PCP: Completed  Subsequent and Final Calls  Do you have any ongoing symptoms?: Yes  Onset of Patient-reported symptoms: In the past 7 days  Have your medications changed?: No  Do you have any questions related to your medications?: No  Do you currently have any active services?: No  Do you have any needs or concerns that I can assist you with?: No  Identified Barriers: None  Care Transitions Interventions   Meds to Bed: Completed     Other Interventions:             Care Transition Nurse provided contact information for future needs.       Ankush Huston LPN

## 2022-12-13 ENCOUNTER — OFFICE VISIT (OUTPATIENT)
Dept: SURGERY | Age: 72
End: 2022-12-13

## 2022-12-13 VITALS
HEIGHT: 72 IN | WEIGHT: 172 LBS | TEMPERATURE: 97.3 F | HEART RATE: 77 BPM | DIASTOLIC BLOOD PRESSURE: 75 MMHG | SYSTOLIC BLOOD PRESSURE: 117 MMHG | BODY MASS INDEX: 23.3 KG/M2

## 2022-12-13 DIAGNOSIS — Z90.49 S/P SMALL BOWEL RESECTION: Primary | ICD-10-CM

## 2022-12-13 PROCEDURE — 99024 POSTOP FOLLOW-UP VISIT: CPT | Performed by: SURGERY

## 2022-12-14 NOTE — PROGRESS NOTES
General Surgery Office Note  [facility] Surgery  Consandre WAYNE Hua MD    Patient's Name/Date of Birth: Jaswinder Cooper / 1950    Date: December 13, 2022     Surgeon: Brandin Hua MD    Chief Complaint:   Chief Complaint   Patient presents with    Follow-up    Bloated    Wound Check       Patient Active Problem List   Diagnosis    Essential hypertension    Bradycardia    Nocturnal hypoxemia due to emphysema [facility])    Obstructive sleep apnea treated with continuous positive airway pressure (CPAP)    Centrilobular emphysema (HCC)    Gastroesophageal reflux disease with esophagitis    Vitreous degeneration    Presbyopia    After-cataract    Monocytosis    Esophageal stenosis    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 No signs of infection    Objective:  /75   Pulse 77   Temp 97.3 °F (36.3 °C) (Temporal)   Ht 6' (1.829 m)   Wt 172 lb (78 kg)   BMI 23.33 kg/m²   Labs:  No results for input(s): WBC, HGB, HCT in the last 72 hours. Invalid input(s): PLR  Lab Results   Component Value Date    CREATININE 0.9 11/18/2022    BUN 19 11/18/2022     11/18/2022    K 3.6 11/18/2022     11/18/2022    CO2 27 11/18/2022     No results for input(s): LIPASE, AMYLASE in the last 72 hours.       General appearance: AA, NAD  HEENT: NCAT, PERRLA, EOMI  Lungs: Clear, equal rise bilateral  Heart: Reg  Abdomen: soft, nondistended, nontender, midline incision with 2cm opening with fibrinous material debrided. no signs of infection, no cellulitis, no hematoma  Skin: No lesions  Psych: No distress, conversive, no hallucinations  : No ulcers or lesions  Rectal: No bleeding    A complete 10 system review was performed and are otherwise negative unless mentioned in the above HPI. Specific negatives are listed below but may not include all those reviewed. General ROS: negative obtundation, AMS  ENT ROS: negative rhinorrhea, epistaxis  Allergy and Immunology ROS: negative itchy/watery eyes or nasal congestion  Hematological and Lymphatic ROS: negative spontaneous bleeding or bruising  Endocrine ROS: negative  lethargy, mood swings, palpitations or polydipsia/polyuria  Respiratory ROS: negative sputum changes, stridor, tachypnea or wheezing  Cardiovascular ROS: negative for - loss of consciousness, murmur or orthopnea  Gastrointestinal ROS: negative for - hematochezia or hematemesis  Genito-Urinary ROS: negative for -  genital discharge or hematuria  Musculoskeletal ROS: negative for - focal weakness, gangrene  Psych/Neuro ROS: negative for - visual or auditory hallucinations, suicidal ideation      Time spent reviewing past medical, surgical, social and family history, vitals, nursing assessment and images. Imaging: n/a    Pathology:   Diagnosis:   Small bowel, resection: Segments of small bowel with serosal adhesions,   submucosal edema and congestion. Negative for neoplasia. Attached lymph node: Small lymphocytic lymphoma.         See comment     Assessment/Plan:  Ilda Davenport is a 67 y.o. male 4 weeks s/p lap to open SAMI with SBR    Local wound care to midline  F/u in as needed  Appointment next week with oncology for path finding of small lymphocytic lymphoma    Physician Signature: Electronically signed by Dr. Pura Mata  12/13/2022

## 2022-12-15 PROBLEM — Z90.49 S/P SMALL BOWEL RESECTION: Status: ACTIVE | Noted: 2022-12-15

## 2023-02-07 ENCOUNTER — OFFICE VISIT (OUTPATIENT)
Dept: FAMILY MEDICINE CLINIC | Age: 73
End: 2023-02-07
Payer: MEDICARE

## 2023-02-07 VITALS
BODY MASS INDEX: 22.89 KG/M2 | RESPIRATION RATE: 16 BRPM | WEIGHT: 169 LBS | TEMPERATURE: 97.4 F | HEART RATE: 69 BPM | DIASTOLIC BLOOD PRESSURE: 60 MMHG | SYSTOLIC BLOOD PRESSURE: 108 MMHG | HEIGHT: 72 IN | OXYGEN SATURATION: 98 %

## 2023-02-07 DIAGNOSIS — M54.42 CHRONIC BILATERAL LOW BACK PAIN WITH LEFT-SIDED SCIATICA: ICD-10-CM

## 2023-02-07 DIAGNOSIS — I10 ESSENTIAL HYPERTENSION: Primary | ICD-10-CM

## 2023-02-07 DIAGNOSIS — C83.00 SMALL LYMPHOCYTIC LYMPHOMA (HCC): ICD-10-CM

## 2023-02-07 DIAGNOSIS — I27.20 PULMONARY HYPERTENSION (HCC): ICD-10-CM

## 2023-02-07 DIAGNOSIS — I77.89 ENLARGED AORTA (HCC): ICD-10-CM

## 2023-02-07 DIAGNOSIS — I48.0 PAROXYSMAL ATRIAL FIBRILLATION (HCC): ICD-10-CM

## 2023-02-07 DIAGNOSIS — G89.29 CHRONIC BILATERAL LOW BACK PAIN WITH LEFT-SIDED SCIATICA: ICD-10-CM

## 2023-02-07 DIAGNOSIS — J43.2 CENTRILOBULAR EMPHYSEMA (HCC): ICD-10-CM

## 2023-02-07 PROBLEM — K56.609 SBO (SMALL BOWEL OBSTRUCTION) (HCC): Status: RESOLVED | Noted: 2022-11-11 | Resolved: 2023-02-07

## 2023-02-07 PROBLEM — K56.609 INTESTINAL OBSTRUCTION (HCC): Status: RESOLVED | Noted: 2022-11-15 | Resolved: 2023-02-07

## 2023-02-07 PROCEDURE — 3078F DIAST BP <80 MM HG: CPT | Performed by: NURSE PRACTITIONER

## 2023-02-07 PROCEDURE — 1123F ACP DISCUSS/DSCN MKR DOCD: CPT | Performed by: NURSE PRACTITIONER

## 2023-02-07 PROCEDURE — 99214 OFFICE O/P EST MOD 30 MIN: CPT | Performed by: NURSE PRACTITIONER

## 2023-02-07 PROCEDURE — 3074F SYST BP LT 130 MM HG: CPT | Performed by: NURSE PRACTITIONER

## 2023-02-07 RX ORDER — BACLOFEN 10 MG/1
10 TABLET ORAL NIGHTLY PRN
Qty: 90 TABLET | Refills: 1 | Status: SHIPPED
Start: 2023-02-07 | End: 2023-02-08 | Stop reason: SDUPTHER

## 2023-02-07 ASSESSMENT — PATIENT HEALTH QUESTIONNAIRE - PHQ9
2. FEELING DOWN, DEPRESSED OR HOPELESS: 0
SUM OF ALL RESPONSES TO PHQ QUESTIONS 1-9: 0
SUM OF ALL RESPONSES TO PHQ QUESTIONS 1-9: 0
1. LITTLE INTEREST OR PLEASURE IN DOING THINGS: 0
SUM OF ALL RESPONSES TO PHQ9 QUESTIONS 1 & 2: 0
SUM OF ALL RESPONSES TO PHQ QUESTIONS 1-9: 0
SUM OF ALL RESPONSES TO PHQ QUESTIONS 1-9: 0

## 2023-02-07 NOTE — ASSESSMENT & PLAN NOTE
Stable he follows with Dr Dimitry Rust recent CT lung reviewed severe COPD and no changes. He does have right sided heart enlargement. He did resume smoking.  Continue Anoro

## 2023-02-07 NOTE — PROGRESS NOTES
OFFICE PROGRESS NOTE  [address] [place] [address] Rd  [address] [id number]  Dept: [phone]   Chief Complaint   Patient presents with    Gastroesophageal Reflux    Hypertension    Health Maintenance     Due for shingles, meningococcal, meningococcal B,        ASSESSMENT/PLAN   1. Essential hypertension  Assessment & Plan:   well controlled and currently not on any medications, Notes from DR Keene reviewed per notes he is to be taking the metoprolol but patient stopped it stating he feels much better.   -Discussed taking medication and directed every day. -Discussed exercising daily 30 minutes 5 times a week for 150 minutes weekly.  -Discussed weight reduction if needed.  -Discussed low sodium diet.  -Discussed limiting caffeine consumption and tobacco cessation and the effects they have on the heart and blood pressure. 2. Paroxysmal atrial fibrillation (HCC)  Assessment & Plan:   Stable at this time, he declined any anticoagulation was to be on Metoprolol in review of Dr Sridhar Taylor but the patient stopped it felt secondary to stress of surgery. 3. Chronic bilateral low back pain with left-sided sciatica  -     baclofen (LIORESAL) 10 MG tablet; Take 1 tablet by mouth nightly as needed (muscle spasms), Disp-90 tablet, R-1Normal  4. Small lymphocytic lymphoma ([facility] [address]  Assessment & Plan:   New problem he has seen DR Shad Lund and workup completed, notes reviewed as well as pathology, at this time felt to not be active. He understands the B symptoms and will follow up with Dr Shad Lund in April 2023  5. Centrilobular emphysema (HCC)  Assessment & Plan:   Stable he follows with Dr Miko Junior recent CT lung reviewed severe COPD and no changes. He does have right sided heart enlargement. He did resume smoking. Continue Anoro    6. Pulmonary hypertension (HCC)  Assessment & Plan:   Stable he follows with DR Keene and Dr Miko Junior recent CT lung reviewed and no changes.  He does have right sided heart enlargement  7. Enlarged aorta (HCC)  Assessment & Plan:   Well-controlled, continue current medications minimal change from last year so repeat in 2 - 3 years. Reviewed labs: CMP, CBCD, in epic on Dr Gil Palacios notes  Reviewed notes from [facility], [address]  Reviewed radiology CT lung screening     Discussed smoking cessation, Discussed exercising 30 minutes daily, and Discussed taking medications as directed and adverse effects    Return in about 6 months (around 8/7/2023) for HTN.     HPI:   He saw Dr Henny Murrieta as he developed atrial fibrillation and was started on Amiodarone and metoprolol but according to Dr Darlene Ascencio notes he was to continue metoprolol but patient stopped it. He states he feels much better off of the medications and no problems. His Blood pressure has been well controlled at home. He did quit smoking but is back at it again but only 8 cigarettes per day now. Reviewed his CT chest and he has severe COPD follows with Dr Martine Newberry.     Chronic back pain has been better since starting the baclofen and he is able to sleep at night. Needs refill today, he has scoliosis. He also was found to have from his surgery small lymphocytic lymphoma and he saw Dr Shae Stoddard had pet scan done and was told it was not active. He has is feeling well. Notes reviewed from [address] [place] [address]. He follows with Dr Augustina Gomez for his emphysema and pulmonary hypertension, he complains of cough can be productive in the mornings but most of the time dry. Denies any SOB, wheezing. He is on Anoro and albuterol only as needed. In review of the retroperitoneal US from last year his aorta is stable and recommendation is to repeat in 3 years or sooner if any issues. FINDINGS:       Aorta:       Proximal abdominal aorta is upper limits of normal at 3.1 x 3.2 cm. Previous   measurements were 3.3 x 3.1 cm.   The mid abdominal measures 2.8 x 2.4 cm and   distal abdominal measures 2.0 x 2. 5 cm. The right iliac vessels normal in   caliber 1.3 x 1.2 cm and left iliac vessel measures 1.4 x 1.2 cm. Iliacs:       Iliac arteries are patent and normal in caliber. 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 RECOMMENDATIONS:   Follow-up recommended of the abdominal aorta every 3 years. Current Outpatient Medications:     baclofen (LIORESAL) 10 MG tablet, Take 1 tablet by mouth nightly as needed (muscle spasms), Disp: 90 tablet, Rfl: 1    ANORO ELLIPTA 62.5-25 MCG/ACT AEPB, Inhale 1 puff into the lungs daily, Disp: 3 each, Rfl: 3    acetaminophen (TYLENOL) 500 MG tablet, Take 500 mg by mouth every 6 hours as needed for Pain, Disp: , Rfl:     Hyprom-Naphaz-Polysorb-Zn Sulf (CLEAR EYES COMPLETE OP), Apply to eye, Disp: , Rfl:     albuterol sulfate HFA (PROAIR HFA) 108 (90 Base) MCG/ACT inhaler, Inhale 2 puffs into the lungs every 6 hours as needed for Wheezing, Disp: 1 each, Rfl: 0    omeprazole (PRILOSEC) 40 MG delayed release capsule, Take 1 capsule by mouth daily, Disp: 90 capsule, Rfl: 3    Omega-3 Fatty Acids (FISH OIL) 1000 MG CAPS, Take 3,000 mg by mouth 3 times daily, Disp: , Rfl:     Multiple Vitamins-Minerals (ICAPS AREDS FORMULA PO), Take by mouth, Disp: , Rfl:     Gluc-Chonn-MSM-Boswellia-Vit D (GLUCOSAMINE CHONDROITIN + D3 PO), Take 1 tablet by mouth daily (Patient not taking: Reported on 2/7/2023), Disp: , Rfl:       Surgical History:  has a past surgical history that includes Splenectomy; Hemorrhoid surgery; Tonsillectomy and adenoidectomy; Cardiac catheterization; eye surgery (Left, 03/2022); and colectomy (N/A, 11/13/2022). Social History:  reports that he has been smoking cigarettes. He started smoking about 61 years ago. He has a 15.00 pack-year smoking history. He has never used smokeless tobacco. He reports current alcohol use.  He reports that he does not use drugs. Family History: family history includes Cancer in his father, mother, and sister. I have reviewed Tyrese's allergies, medications, problem list, medical, social and family history and have updated as needed in the electronic medical record    Unless otherwise stated in this report the patient's positive and negative responses for review of systems for constitutional, eyes, ENT, cardiovascular, respiratory, gastrointestinal, neurological, , musculoskeletal, and integument systems and related systems to the presenting problem are either stated in the history of present illness or were not pertinent or were negative for the symptoms and/or complaints related to the presenting medical problem. Positives and pertinent negatives as per HPI. All others reviewed and are negative. OBJECTIVE:     VS:  Wt Readings from Last 3 Encounters:   02/07/23 169 lb (76.7 kg)   12/13/22 172 lb (78 kg)   11/11/22 172 lb (78 kg)                       Vitals:    02/07/23 0908   BP: 108/60   Pulse: 69   Resp: 16   Temp: 97.4 °F (36.3 °C)   SpO2: 98%   Weight: 169 lb (76.7 kg)   Height: 6' (1.829 m)       General: Alert and oriented to person, place, and time, well developed and well nourished, in no acute distress  SKIN: Warm and dry, intact without any rash, masses or lesions  HEAD: normocephalic, atraumatic  Eyes: sclera/conjunctiva clear, PERRLA, EOMI's intact  ENT: tympanic membranes, external ear and ear canal normal bilaterally, normal hearing, Nose without deformity, nasal mucosa and turbinates normal without polyps   Throat: clear, tongue midline, no  drainage, no masses or lesions noted, full dentures  Neck: supple and non-tender without mass, trachea midline, no cervical lymphadenopathy, no bruit, no thyromegaly or nodules  Cardiovascular: regular rate and regular rhythm, normal S1 and S2,  no murmurs, rubs, clicks, or gallop. Distal pulses intact, no carotid bruits.  No edema  Pulmonary/Chest: clear to auscultation bilaterally, no wheezes, rales or rhonchi, normal air movement, no respiratory distress  Abdomen: soft, non-tender, non-distended, normal bowel sounds, no masses or hepatosplenomegaly  Musculoskeletal: Normal ROM,  however with right shoulder significantly lower than the left, left convex scoliosis lumbar noted,  no joint swelling,  or tenderness   Neurologic:  gait, coordination and speech normal  Extremities: no clubbing, cyanosis, or edema. Psychiatric: Good eye contact, normal mood and affect, answers questions appropriately    I have reviewed my findings and recommendations with America Bowers.     Pierre Bourgeois, APRN - CNP, NP-C, FNP-BC

## 2023-02-07 NOTE — ASSESSMENT & PLAN NOTE
Well-controlled, continue current medications minimal change from last year so repeat in 2 - 3 years.

## 2023-02-07 NOTE — ASSESSMENT & PLAN NOTE
well controlled and currently not on any medications, Notes from DR Keene reviewed per notes he is to be taking the metoprolol but patient stopped it stating he feels much better.   -Discussed taking medication and directed every day. -Discussed exercising daily 30 minutes 5 times a week for 150 minutes weekly.  -Discussed weight reduction if needed.  -Discussed low sodium diet.  -Discussed limiting caffeine consumption and tobacco cessation and the effects they have on the heart and blood pressure.

## 2023-02-07 NOTE — ASSESSMENT & PLAN NOTE
New problem he has seen DR Jayjay Hudson and workup completed, notes reviewed as well as pathology, at this time felt to not be active.  He understands the B symptoms and will follow up with Dr Jayjay Hudson in April 2023

## 2023-02-07 NOTE — ASSESSMENT & PLAN NOTE
Stable at this time, he declined any anticoagulation was to be on Metoprolol in review of Dr Raven Lugo but the patient stopped it felt secondary to stress of surgery.

## 2023-02-07 NOTE — ASSESSMENT & PLAN NOTE
Stable he follows with DR Keene and Dr Edward Carr recent CT lung reviewed and no changes.  He does have right sided heart enlargement

## 2023-02-08 DIAGNOSIS — M54.42 CHRONIC BILATERAL LOW BACK PAIN WITH LEFT-SIDED SCIATICA: ICD-10-CM

## 2023-02-08 DIAGNOSIS — G89.29 CHRONIC BILATERAL LOW BACK PAIN WITH LEFT-SIDED SCIATICA: ICD-10-CM

## 2023-02-08 RX ORDER — BACLOFEN 10 MG/1
10 TABLET ORAL NIGHTLY PRN
Qty: 90 TABLET | Refills: 0 | Status: SHIPPED | OUTPATIENT
Start: 2023-02-08

## 2023-08-08 ENCOUNTER — OFFICE VISIT (OUTPATIENT)
Dept: FAMILY MEDICINE CLINIC | Age: 73
End: 2023-08-08
Payer: MEDICARE

## 2023-08-08 VITALS
WEIGHT: 172 LBS | BODY MASS INDEX: 23.3 KG/M2 | HEART RATE: 68 BPM | RESPIRATION RATE: 16 BRPM | OXYGEN SATURATION: 94 % | TEMPERATURE: 98 F | SYSTOLIC BLOOD PRESSURE: 100 MMHG | HEIGHT: 72 IN | DIASTOLIC BLOOD PRESSURE: 68 MMHG

## 2023-08-08 DIAGNOSIS — M54.42 CHRONIC BILATERAL LOW BACK PAIN WITH LEFT-SIDED SCIATICA: ICD-10-CM

## 2023-08-08 DIAGNOSIS — I10 ESSENTIAL HYPERTENSION: Primary | ICD-10-CM

## 2023-08-08 DIAGNOSIS — G89.29 CHRONIC BILATERAL LOW BACK PAIN WITH LEFT-SIDED SCIATICA: ICD-10-CM

## 2023-08-08 DIAGNOSIS — C83.00 SMALL LYMPHOCYTIC LYMPHOMA (HCC): ICD-10-CM

## 2023-08-08 DIAGNOSIS — Z90.81 ASPLENIA AFTER SURGICAL PROCEDURE: ICD-10-CM

## 2023-08-08 DIAGNOSIS — J43.2 CENTRILOBULAR EMPHYSEMA (HCC): ICD-10-CM

## 2023-08-08 PROCEDURE — 3078F DIAST BP <80 MM HG: CPT | Performed by: NURSE PRACTITIONER

## 2023-08-08 PROCEDURE — 1123F ACP DISCUSS/DSCN MKR DOCD: CPT | Performed by: NURSE PRACTITIONER

## 2023-08-08 PROCEDURE — 3074F SYST BP LT 130 MM HG: CPT | Performed by: NURSE PRACTITIONER

## 2023-08-08 PROCEDURE — 99214 OFFICE O/P EST MOD 30 MIN: CPT | Performed by: NURSE PRACTITIONER

## 2023-08-08 RX ORDER — UMECLIDINIUM BROMIDE AND VILANTEROL TRIFENATATE 62.5; 25 UG/1; UG/1
1 POWDER RESPIRATORY (INHALATION) DAILY
Qty: 3 EACH | Refills: 3 | Status: CANCELLED | OUTPATIENT
Start: 2023-08-08

## 2023-08-08 RX ORDER — RAMIPRIL 2.5 MG/1
CAPSULE ORAL
COMMUNITY
Start: 2023-05-30

## 2023-08-08 RX ORDER — BACLOFEN 10 MG/1
10 TABLET ORAL NIGHTLY PRN
Qty: 90 TABLET | Refills: 0 | Status: SHIPPED | OUTPATIENT
Start: 2023-08-08

## 2023-08-08 ASSESSMENT — ENCOUNTER SYMPTOMS
VOICE CHANGE: 0
COUGH: 0
FACIAL SWELLING: 0
CHEST TIGHTNESS: 0
DIARRHEA: 0
SHORTNESS OF BREATH: 0
SORE THROAT: 0
SINUS PRESSURE: 0
CONSTIPATION: 0
NAUSEA: 0
BACK PAIN: 1
COLOR CHANGE: 0
TROUBLE SWALLOWING: 0
WHEEZING: 0
ABDOMINAL PAIN: 0
SINUS PAIN: 0
VOMITING: 0
RHINORRHEA: 0

## 2023-08-08 NOTE — ASSESSMENT & PLAN NOTE
Stable notes reviewed from [address] ([facility]) 7/17/23 currently no treatment. Lab reviewed.

## 2023-08-08 NOTE — ASSESSMENT & PLAN NOTE
well controlled, no significant side effects from medication noted. HE is only using the Ramipril 2.5 mg as needed as he feels better not taking it. Did see Dr Larry Wang but no notes to review. labs reviewed:CMP, CBCD in Dr Farzaneh Nettles notes stable at this time   -Discussed taking medication as directed every day around the same time. Do not double up if skipped or missed doses. -Discussed exercising daily 30 minutes 5 times a week for 150 minutes weekly to help with stress reduction and weight reduction if needed.  -Discussed low sodium diet.  -Discussed limiting caffeine consumption and tobacco cessation and the effects they have on the heart and blood pressure.

## 2023-08-08 NOTE — ASSESSMENT & PLAN NOTE
Stable at this time on the Anoro. He follows with Dr Shawn Gabriel and will be due for CT end of the year.

## 2023-08-08 NOTE — PROGRESS NOTES
OFFICE PROGRESS NOTE  [facility]  1932 [address] [facility] [address]  Dept: [phone]   Chief Complaint   Patient presents with    Hypertension    Health Maintenance       ASSESSMENT/PLAN   1. Essential hypertension  Assessment & Plan:   well controlled, no significant side effects from medication noted. HE is only using the Ramipril 2.5 mg as needed as he feels better not taking it. Did see Dr Brittany Melvin but no notes to review. labs reviewed:CMP, CBCD in Dr Troy Aguilera notes stable at this time   -Discussed taking medication as directed every day around the same time. Do not double up if skipped or missed doses. -Discussed exercising daily 30 minutes 5 times a week for 150 minutes weekly to help with stress reduction and weight reduction if needed.  -Discussed low sodium diet.  -Discussed limiting caffeine consumption and tobacco cessation and the effects they have on the heart and blood pressure. 2. Small lymphocytic lymphoma ([address])  Assessment & Plan:   Stable notes reviewed from  [facility] 7/17/23 currently no treatment. Lab reviewed. 3. Centrilobular emphysema ([address])  Assessment & Plan:   Stable at this time on the Anoro. He follows with Dr Joel Meade and will be due for CT end of the year. 4. 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  Assessment & Plan:   Due for last doses of Menactra and Trumemba  Orders:  -     meningococcal group B (BEXSERO) ROSA MARIA injection; Inject 0.5 mLs into the muscle once for 1 dose Repeat in 1 month due to Asplenia, Disp-0.5 mL, R-0Print  -     meningococcal polysaccharide (MENACTRA) SOLN injection; Inject 0.5 mLs into the muscle once for 1 dose, Disp-0.5 mL, R-0Print  5. Chronic bilateral low back pain with left-sided sciatica  Assessment & Plan:   Stable using the baclofen only if he gets muscle spasms. Orders:  -     baclofen (LIORESAL) 10 MG tablet;  Take 1 tablet by mouth nightly as needed (muscle spasms), Disp-90

## 2023-10-04 ENCOUNTER — TELEPHONE (OUTPATIENT)
Dept: PHARMACY | Facility: CLINIC | Age: 73
End: 2023-10-04

## 2023-10-04 NOTE — TELEPHONE ENCOUNTER
[facility]: ADHERENCE REVIEW  Identified care gap per [employer]: fills at Non-preferred pharmacy: [address]: ACE/ARB adherence    ASSESSMENT    ACE/ARB ADHERENCE    Insurance Records claims through 23 (Prior Year  [address] = 98% - PASSED; YTD PDC = 77%; Potential Fail Date: 10/8/23):   RAMIPRIL     CAP 2.5MG last filled on 23 for 90 day supply. Next refill due: 23    Prescribed si tablet/capsule daily    Per Reconcile Dispense History: 1 refill remains    Per chart, 23 PCP OV note: \"is only using the Ramipril 2.5 mg as needed as he feels better not taking it. \"    BP Readings from Last 3 Encounters:   23 100/68   23 108/60   22 117/75     CrCl cannot be calculated (Patient's most recent lab result is older than the maximum 180 days allowed. ). Lab Results   Component Value Date    CREATININE 0.9 2022     Lab Results   Component Value Date    K 3.6 2022     PLAN  Patient found in Outcomes MTM and is not currently eligible for CMR or TIP    The following are interventions that have been identified:   Patient overdue refilling ramipril 2.5mg daily (per last OV note, taking prn now?) and active on home medication list.     Attempting to reach patient to review. Left message asking for return call. Will not send letter at this time, appears was reviewed at last PCP visit. Appears refill is available.     Last Visit: 23  Next Visit: 23    Philly Quinones, JameD, [address] [place] [address], toll free: [phone], option 1     =======================================================   For Pharmacy Admin Tracking Only    Program: Shan in place:  No  Gap Closed?: No   Time Spent (min): 10

## 2023-10-04 NOTE — TELEPHONE ENCOUNTER
Doug Hines and Marylu Keating returned an adherence call concerning RAMIPRIL CAP 2.5 MG. Pt states that he takes RAMIPRIL 2.5 mg on a prn basis. He states that when he takes it daily, he feels lightheaded and doesn't feel well. His blood pressure runs low when he takes it daily. But then states that his blood pressure runs low anyway. Jesusmarianaagustina Keating asked him why he is taking this medication if his blood pressure is low and he said his cardiologist makes him take it. I asked Doug Hines if he takes his blood pressure and then takes or doesn't take a capsule. He said no, he just takes it occasionally. I asked him how he decides if he takes a capsule and he didn't answer the question. Marylu Keating stated that we are getting no info from him. Marylu Keating was wondering if we can have this changed in his chart because they get calls concerning this all the time. I informed her that we will reach out to the provider and ask them to update the medication list with these directions. I suggested Doug Hines talk to the doctor about updating his chart also when he sees him in November.     Yan Buenrostro, [address]   [phone] option 2

## 2023-11-14 ENCOUNTER — OFFICE VISIT (OUTPATIENT)
Dept: FAMILY MEDICINE CLINIC | Age: 73
End: 2023-11-14

## 2023-11-14 VITALS
WEIGHT: 173.5 LBS | HEIGHT: 72 IN | HEART RATE: 60 BPM | SYSTOLIC BLOOD PRESSURE: 106 MMHG | TEMPERATURE: 97.2 F | OXYGEN SATURATION: 97 % | DIASTOLIC BLOOD PRESSURE: 62 MMHG | RESPIRATION RATE: 16 BRPM | BODY MASS INDEX: 23.5 KG/M2

## 2023-11-14 DIAGNOSIS — Z00.00 MEDICARE ANNUAL WELLNESS VISIT, SUBSEQUENT: Primary | ICD-10-CM

## 2023-11-14 DIAGNOSIS — Z13.6 SCREENING FOR CARDIOVASCULAR CONDITION: ICD-10-CM

## 2023-11-14 DIAGNOSIS — I10 ESSENTIAL HYPERTENSION: ICD-10-CM

## 2023-11-14 DIAGNOSIS — Z87.891 PERSONAL HISTORY OF TOBACCO USE, PRESENTING HAZARDS TO HEALTH: ICD-10-CM

## 2023-11-14 DIAGNOSIS — Z90.81 ASPLENIA AFTER SURGICAL PROCEDURE: ICD-10-CM

## 2023-11-14 DIAGNOSIS — Z12.5 SCREENING FOR MALIGNANT NEOPLASM OF PROSTATE: ICD-10-CM

## 2023-11-14 DIAGNOSIS — Z23 FLU VACCINE NEED: ICD-10-CM

## 2023-11-14 SDOH — ECONOMIC STABILITY: FOOD INSECURITY: WITHIN THE PAST 12 MONTHS, YOU WORRIED THAT YOUR FOOD WOULD RUN OUT BEFORE YOU GOT MONEY TO BUY MORE.: NEVER TRUE

## 2023-11-14 SDOH — ECONOMIC STABILITY: FOOD INSECURITY: WITHIN THE PAST 12 MONTHS, THE FOOD YOU BOUGHT JUST DIDN'T LAST AND YOU DIDN'T HAVE MONEY TO GET MORE.: NEVER TRUE

## 2023-11-14 SDOH — ECONOMIC STABILITY: INCOME INSECURITY: HOW HARD IS IT FOR YOU TO PAY FOR THE VERY BASICS LIKE FOOD, HOUSING, MEDICAL CARE, AND HEATING?: NOT HARD AT ALL

## 2023-11-14 ASSESSMENT — PATIENT HEALTH QUESTIONNAIRE - PHQ9
SUM OF ALL RESPONSES TO PHQ QUESTIONS 1-9: 0
1. LITTLE INTEREST OR PLEASURE IN DOING THINGS: 0
SUM OF ALL RESPONSES TO PHQ9 QUESTIONS 1 & 2: 0
SUM OF ALL RESPONSES TO PHQ QUESTIONS 1-9: 0
2. FEELING DOWN, DEPRESSED OR HOPELESS: 0
SUM OF ALL RESPONSES TO PHQ QUESTIONS 1-9: 0
SUM OF ALL RESPONSES TO PHQ QUESTIONS 1-9: 0

## 2023-11-14 ASSESSMENT — LIFESTYLE VARIABLES
HOW MANY STANDARD DRINKS CONTAINING ALCOHOL DO YOU HAVE ON A TYPICAL DAY: PATIENT DOES NOT DRINK
HOW OFTEN DO YOU HAVE A DRINK CONTAINING ALCOHOL: NEVER

## 2023-11-14 NOTE — PROGRESS NOTES
Medicare Annual Wellness Visit    Brijesh Cortes is here for Medicare AWV and Health Maintenance    Assessment & Plan   Medicare annual wellness visit, subsequent  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  -     meningococcal polysaccharide (MENACTRA) SOLN injection; Inject 0.5 mLs into the muscle once for 1 dose, Disp-0.5 mL, R-0Print  -     Meningococcal B Vac, Recomb, ROSA MARIA; Inject 0.5 mLs into the muscle once for 1 dose, Disp-0.5 mL, R-0Print  -     Comprehensive Metabolic Panel; Future  Screening for malignant neoplasm of prostate  -     PSA Screening; Future  Personal history of tobacco use, presenting hazards to health  -     SC Smoking and Tobacco Use Cessation (Intermediate): 3-10 MINUTES [95038]  Screening for cardiovascular condition  -     Lipid Panel; Future  Flu vaccine need  Assessment & Plan:   Flu vaccine:   Wash your hands regularly, and keep your hands away from your face. Cover your mouth when you cough or sneeze. Rest, plenty of fluids, Tylenol for body aches, fever. Stay home from school, work, and other public places until you are feeling better and your fever has been gone for at least 24 hours. The fever needs to have gone away on its own without the help of medicine. Ask people living with you to talk to their doctors about preventing the flu. They may get antiviral medicine to keep from getting the flu from you. To prevent the flu in the future, get a flu vaccine every fall. Encourage people living with you to get the vaccine.     Minor reactions soreness, redness, or swelling at the site the shot was given.  -hoarseness, sore, red or itchy eyes  -cough, fever, aching, headache, fatigue or itching can occur and can begin  Soon after the shot and last 1 or 2 days.  -Some people get severe pain in the shoulder and have difficulty moving  The arm where the shot was given, this happens rarely.    -Signs of severe reaction occur rarely can include: very high fever, or unusual behavior, hives,

## 2023-11-30 DIAGNOSIS — I10 ESSENTIAL HYPERTENSION: ICD-10-CM

## 2023-11-30 DIAGNOSIS — Z12.5 SCREENING FOR MALIGNANT NEOPLASM OF PROSTATE: ICD-10-CM

## 2023-11-30 DIAGNOSIS — Z90.81 ASPLENIA AFTER SURGICAL PROCEDURE: ICD-10-CM

## 2023-11-30 DIAGNOSIS — Z13.6 SCREENING FOR CARDIOVASCULAR CONDITION: ICD-10-CM

## 2023-11-30 LAB
ABSOLUTE IMMATURE GRANULOCYTE: <0.03 K/UL (ref 0–0.58)
ALBUMIN SERPL-MCNC: 4.2 G/DL (ref 3.5–5.2)
ALP BLD-CCNC: 65 U/L (ref 40–129)
ALT SERPL-CCNC: 6 U/L (ref 0–40)
ANION GAP SERPL CALCULATED.3IONS-SCNC: 12 MMOL/L (ref 7–16)
AST SERPL-CCNC: 12 U/L (ref 0–39)
BASOPHILS ABSOLUTE: 0.07 K/UL (ref 0–0.2)
BASOPHILS RELATIVE PERCENT: 1 % (ref 0–2)
BILIRUB SERPL-MCNC: 0.4 MG/DL (ref 0–1.2)
BUN BLDV-MCNC: 19 MG/DL (ref 6–23)
CALCIUM SERPL-MCNC: 9 MG/DL (ref 8.6–10.2)
CHLORIDE BLD-SCNC: 102 MMOL/L (ref 98–107)
CHOLESTEROL: 168 MG/DL
CO2: 25 MMOL/L (ref 22–29)
CREAT SERPL-MCNC: 1.1 MG/DL (ref 0.7–1.2)
EOSINOPHILS ABSOLUTE: 0.17 K/UL (ref 0.05–0.5)
EOSINOPHILS RELATIVE PERCENT: 3 % (ref 0–6)
GFR SERPL CREATININE-BSD FRML MDRD: >60 ML/MIN/1.73M2
GLUCOSE BLD-MCNC: 88 MG/DL (ref 74–99)
HCT VFR BLD CALC: 39.7 % (ref 37–54)
HDLC SERPL-MCNC: 39 MG/DL
HEMOGLOBIN: 12.4 G/DL (ref 12.5–16.5)
IMMATURE GRANULOCYTES: 0 % (ref 0–5)
LDL CHOLESTEROL: 112 MG/DL
LYMPHOCYTES ABSOLUTE: 2.56 K/UL (ref 1.5–4)
LYMPHOCYTES RELATIVE PERCENT: 40 % (ref 20–42)
MCH RBC QN AUTO: 27.3 PG (ref 26–35)
MCHC RBC AUTO-ENTMCNC: 31.2 G/DL (ref 32–34.5)
MCV RBC AUTO: 87.3 FL (ref 80–99.9)
MONOCYTES ABSOLUTE: 0.76 K/UL (ref 0.1–0.95)
MONOCYTES RELATIVE PERCENT: 12 % (ref 2–12)
NEUTROPHILS ABSOLUTE: 2.78 K/UL (ref 1.8–7.3)
NEUTROPHILS RELATIVE PERCENT: 44 % (ref 43–80)
PDW BLD-RTO: 14.6 % (ref 11.5–15)
PLATELET # BLD: 358 K/UL (ref 130–450)
PMV BLD AUTO: 9.8 FL (ref 7–12)
POTASSIUM SERPL-SCNC: 4.6 MMOL/L (ref 3.5–5)
PROSTATE SPECIFIC ANTIGEN: 1.25 NG/ML (ref 0–4)
RBC # BLD: 4.55 M/UL (ref 3.8–5.8)
SODIUM BLD-SCNC: 139 MMOL/L (ref 132–146)
TOTAL PROTEIN: 6.7 G/DL (ref 6.4–8.3)
TRIGL SERPL-MCNC: 86 MG/DL
VLDLC SERPL CALC-MCNC: 17 MG/DL
WBC # BLD: 6.4 K/UL (ref 4.5–11.5)

## 2023-12-13 DIAGNOSIS — J43.2 CENTRILOBULAR EMPHYSEMA (HCC): ICD-10-CM

## 2023-12-13 DIAGNOSIS — G89.29 CHRONIC BILATERAL LOW BACK PAIN WITH LEFT-SIDED SCIATICA: ICD-10-CM

## 2023-12-13 DIAGNOSIS — K22.70 BARRETT'S ESOPHAGUS DETERMINED BY ENDOSCOPY: ICD-10-CM

## 2023-12-13 DIAGNOSIS — M54.42 CHRONIC BILATERAL LOW BACK PAIN WITH LEFT-SIDED SCIATICA: ICD-10-CM

## 2023-12-13 RX ORDER — BACLOFEN 10 MG/1
10 TABLET ORAL NIGHTLY PRN
Qty: 90 TABLET | Refills: 0 | Status: SHIPPED | OUTPATIENT
Start: 2023-12-13

## 2023-12-13 RX ORDER — UMECLIDINIUM BROMIDE AND VILANTEROL TRIFENATATE 62.5; 25 UG/1; UG/1
1 POWDER RESPIRATORY (INHALATION) DAILY
Qty: 3 EACH | Refills: 3 | Status: SHIPPED | OUTPATIENT
Start: 2023-12-13

## 2023-12-13 RX ORDER — OMEPRAZOLE 40 MG/1
40 CAPSULE, DELAYED RELEASE ORAL DAILY
Qty: 90 CAPSULE | Refills: 3 | Status: SHIPPED | OUTPATIENT
Start: 2023-12-13

## 2024-02-22 ENCOUNTER — OFFICE VISIT (OUTPATIENT)
Dept: FAMILY MEDICINE CLINIC | Age: 74
End: 2024-02-22

## 2024-02-22 VITALS
BODY MASS INDEX: 23.57 KG/M2 | TEMPERATURE: 97.4 F | HEIGHT: 72 IN | SYSTOLIC BLOOD PRESSURE: 118 MMHG | WEIGHT: 174 LBS | DIASTOLIC BLOOD PRESSURE: 60 MMHG | OXYGEN SATURATION: 98 % | RESPIRATION RATE: 16 BRPM | HEART RATE: 78 BPM

## 2024-02-22 DIAGNOSIS — C83.00 SMALL LYMPHOCYTIC LYMPHOMA (HCC): ICD-10-CM

## 2024-02-22 DIAGNOSIS — I77.89 ENLARGED AORTA (HCC): ICD-10-CM

## 2024-02-22 DIAGNOSIS — I10 ESSENTIAL HYPERTENSION: Primary | ICD-10-CM

## 2024-02-22 DIAGNOSIS — I48.0 PAROXYSMAL ATRIAL FIBRILLATION (HCC): ICD-10-CM

## 2024-02-22 DIAGNOSIS — J43.2 CENTRILOBULAR EMPHYSEMA (HCC): ICD-10-CM

## 2024-02-22 DIAGNOSIS — I27.20 PULMONARY HYPERTENSION (HCC): ICD-10-CM

## 2024-02-22 RX ORDER — RAMIPRIL 2.5 MG/1
2.5 CAPSULE ORAL DAILY
Qty: 30 CAPSULE | Refills: 3 | Status: SHIPPED | OUTPATIENT
Start: 2024-02-22

## 2024-02-22 ASSESSMENT — ENCOUNTER SYMPTOMS
SINUS PRESSURE: 0
VOMITING: 0
NAUSEA: 0
COUGH: 0
FACIAL SWELLING: 0
CONSTIPATION: 0
COLOR CHANGE: 0
DIARRHEA: 0
SORE THROAT: 0
VOICE CHANGE: 0
TROUBLE SWALLOWING: 0
SINUS PAIN: 0
CHEST TIGHTNESS: 0
BACK PAIN: 0
RHINORRHEA: 0
SHORTNESS OF BREATH: 0
ABDOMINAL PAIN: 0
WHEEZING: 0

## 2024-02-22 ASSESSMENT — PATIENT HEALTH QUESTIONNAIRE - PHQ9
1. LITTLE INTEREST OR PLEASURE IN DOING THINGS: 0
SUM OF ALL RESPONSES TO PHQ QUESTIONS 1-9: 0
SUM OF ALL RESPONSES TO PHQ QUESTIONS 1-9: 0
SUM OF ALL RESPONSES TO PHQ9 QUESTIONS 1 & 2: 0
2. FEELING DOWN, DEPRESSED OR HOPELESS: 0
SUM OF ALL RESPONSES TO PHQ QUESTIONS 1-9: 0
SUM OF ALL RESPONSES TO PHQ QUESTIONS 1-9: 0

## 2024-02-22 NOTE — ASSESSMENT & PLAN NOTE
Monitored by specialist- no acute findings meriting change in the plan follows with Dr. Apodaca has upcoming appt.

## 2024-02-22 NOTE — ASSESSMENT & PLAN NOTE
well controlled, no significant side effects from medication noted. HE is only using the Ramipril 2.5 mg as needed as he feels better not taking it. Upcoming appt with  Dr Keene . labs reviewed:CMP, CBCD, lipids, psa in epic 11/2023 stable -Discussed taking medication as directed every day around the same time. Do not double up if skipped or missed doses.   -Discussed exercising daily 30 minutes 5 times a week for 150 minutes weekly to help with stress reduction and weight reduction if needed.  -Discussed low sodium diet.  -Discussed limiting caffeine consumption and tobacco cessation and the effects they have on the heart and blood pressure.

## 2024-02-22 NOTE — ASSESSMENT & PLAN NOTE
Monitored by specialist- no acute findings meriting change in the plan follows with Dr Forman recent notes reviewed and stable continue current medications per pulmonary

## 2024-02-22 NOTE — ASSESSMENT & PLAN NOTE
Monitored by specialist- no acute findings meriting change in the plan upcoming appt with Dr. Keene he does take 325 mg ASA daily

## 2024-02-22 NOTE — ASSESSMENT & PLAN NOTE
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     RECOMMENDATIONS:  Follow-up recommended of the abdominal aorta every 3 years.

## 2024-02-22 NOTE — PROGRESS NOTES
OFFICE PROGRESS NOTE  [facility]  [address]  [place] [address]  Dept: [phone]   Chief Complaint   Patient presents with    Hypertension    Health Maintenance     Due for rsv, low dose CT screen(done at [facility]), Getting,        ASSESSMENT/PLAN   1. Essential hypertension  Assessment & Plan:  well controlled, no significant side effects from medication noted. HE is only using the Ramipril 2.5 mg as needed as he feels better not taking it. Upcoming appt with  Dr Keene . labs reviewed:CMP, CBCD, lipids, psa in epic 11/2023 stable -Discussed taking medication as directed every day around the same time. Do not double up if skipped or missed doses.   -Discussed exercising daily 30 minutes 5 times a week for 150 minutes weekly to help with stress reduction and weight reduction if needed.  -Discussed low sodium diet.  -Discussed limiting caffeine consumption and tobacco cessation and the effects they have on the heart and blood pressure.   Orders:  -     ramipril (ALTACE) 2.5 MG capsule; Take 1 capsule by mouth daily, Disp-30 capsule, R-3Normal  2. Centrilobular emphysema (HCC)  Assessment & Plan:   Monitored by specialist- no acute findings meriting change in the plan follows with Dr Forman recent notes reviewed and stable continue current medications per pulmonary  3. Pulmonary hypertension (HCC)  Assessment & Plan:   Monitored by specialist- no acute findings meriting change in the plan Dr Forman  4. Small lymphocytic lymphoma (HCC)  Assessment & Plan:   Monitored by specialist- no acute findings meriting change in the plan follows with Dr. Apodaca has upcoming appt.   5. Paroxysmal atrial fibrillation (HCC)  Assessment & Plan:   Monitored by specialist- no acute findings meriting change in the plan upcoming appt with Dr. Keene he does take 325 mg ASA daily  6. Enlarged aorta (HCC)  Assessment & Plan:   Stable  IMPRESSION:12/2022   Mild ectasia

## 2024-05-24 DIAGNOSIS — I10 ESSENTIAL HYPERTENSION: ICD-10-CM

## 2024-05-24 RX ORDER — RAMIPRIL 2.5 MG/1
2.5 CAPSULE ORAL DAILY
Qty: 30 CAPSULE | Refills: 0 | OUTPATIENT
Start: 2024-05-24

## 2024-06-14 DIAGNOSIS — I10 ESSENTIAL HYPERTENSION: ICD-10-CM

## 2024-06-14 RX ORDER — RAMIPRIL 2.5 MG/1
2.5 CAPSULE ORAL DAILY
Qty: 30 CAPSULE | Refills: 2 | Status: SHIPPED | OUTPATIENT
Start: 2024-06-14

## 2024-06-14 NOTE — TELEPHONE ENCOUNTER
Last seen 2/22/2024  Next appt 8/22/2024    Requested Prescriptions     Pending Prescriptions Disp Refills    ramipril (ALTACE) 2.5 MG capsule [Pharmacy Med Name: RAMIPRIL 2.5 MG CAPSULE] 30 capsule 0     Sig: Take 1 capsule by mouth daily

## 2024-08-22 ENCOUNTER — OFFICE VISIT (OUTPATIENT)
Dept: FAMILY MEDICINE CLINIC | Age: 74
End: 2024-08-22

## 2024-08-22 VITALS
SYSTOLIC BLOOD PRESSURE: 122 MMHG | HEIGHT: 71 IN | BODY MASS INDEX: 23.94 KG/M2 | TEMPERATURE: 97.1 F | WEIGHT: 171 LBS | DIASTOLIC BLOOD PRESSURE: 58 MMHG | OXYGEN SATURATION: 97 % | HEART RATE: 45 BPM | RESPIRATION RATE: 16 BRPM

## 2024-08-22 DIAGNOSIS — G89.29 CHRONIC BILATERAL LOW BACK PAIN WITH LEFT-SIDED SCIATICA: ICD-10-CM

## 2024-08-22 DIAGNOSIS — K22.70 BARRETT'S ESOPHAGUS DETERMINED BY ENDOSCOPY: ICD-10-CM

## 2024-08-22 DIAGNOSIS — J43.2 CENTRILOBULAR EMPHYSEMA (HCC): ICD-10-CM

## 2024-08-22 DIAGNOSIS — M54.42 CHRONIC BILATERAL LOW BACK PAIN WITH LEFT-SIDED SCIATICA: ICD-10-CM

## 2024-08-22 DIAGNOSIS — I10 ESSENTIAL HYPERTENSION: Primary | ICD-10-CM

## 2024-08-22 RX ORDER — FERROUS SULFATE 325(65) MG
325 TABLET, DELAYED RELEASE (ENTERIC COATED) ORAL
COMMUNITY

## 2024-08-22 RX ORDER — UMECLIDINIUM BROMIDE AND VILANTEROL TRIFENATATE 62.5; 25 UG/1; UG/1
1 POWDER RESPIRATORY (INHALATION) DAILY
Qty: 3 EACH | Refills: 0 | Status: SHIPPED | OUTPATIENT
Start: 2024-08-22

## 2024-08-22 RX ORDER — OMEPRAZOLE 40 MG/1
40 CAPSULE, DELAYED RELEASE ORAL DAILY
Qty: 90 CAPSULE | Refills: 1 | Status: SHIPPED | OUTPATIENT
Start: 2024-08-22

## 2024-08-22 RX ORDER — BACLOFEN 10 MG/1
10 TABLET ORAL NIGHTLY PRN
Qty: 90 TABLET | Refills: 0 | Status: SHIPPED | OUTPATIENT
Start: 2024-08-22

## 2024-08-22 RX ORDER — RAMIPRIL 2.5 MG/1
2.5 CAPSULE ORAL DAILY
Qty: 90 CAPSULE | Refills: 1 | Status: SHIPPED | OUTPATIENT
Start: 2024-08-22

## 2024-08-22 RX ORDER — ALBUTEROL SULFATE 90 UG/1
2 AEROSOL, METERED RESPIRATORY (INHALATION) EVERY 6 HOURS PRN
Qty: 1 EACH | Refills: 0 | Status: SHIPPED | OUTPATIENT
Start: 2024-08-22

## 2024-08-22 ASSESSMENT — ENCOUNTER SYMPTOMS
DIARRHEA: 0
RHINORRHEA: 0
COUGH: 0
SINUS PAIN: 0
BACK PAIN: 0
VOMITING: 0
SHORTNESS OF BREATH: 0
FACIAL SWELLING: 0
ABDOMINAL PAIN: 0
SORE THROAT: 0
CONSTIPATION: 0
VOICE CHANGE: 0
TROUBLE SWALLOWING: 0
WHEEZING: 0
CHEST TIGHTNESS: 0
SINUS PRESSURE: 0
COLOR CHANGE: 0
NAUSEA: 0

## 2024-08-22 NOTE — ASSESSMENT & PLAN NOTE
Monitored by specialist- no acute findings meriting change in the plan follows with Dr Forman recent notes reviewed and stable continue current medications per pulmonary Anoro refilled today and will get recent CT chest

## 2024-08-22 NOTE — ASSESSMENT & PLAN NOTE
well controlled, no significant side effects from medication noted. HE is only using the Ramipril 2.5 mg as needed as he feels better not taking it. Upcoming appt with  Dr Keene . labs reviewed:CMP, CBCD,  from Dr Constanza mccullough -Discussed taking medication as directed every day around the same time. Do not double up if skipped or missed doses.   -Discussed exercising daily 30 minutes 5 times a week for 150 minutes weekly to help with stress reduction and weight reduction if needed.  -Discussed low sodium diet.  -Discussed limiting caffeine consumption and tobacco cessation and the effects they have on the heart and blood pressure.

## 2024-08-22 NOTE — PROGRESS NOTES
OFFICE PROGRESS NOTE  [facility]  [address]  [place] [address]  Dept: [phone]   Chief Complaint   Patient presents with    Hypertension       ASSESSMENT/PLAN   1. Essential hypertension  Assessment & Plan:  well controlled, no significant side effects from medication noted. HE is only using the Ramipril 2.5 mg as needed as he feels better not taking it. Upcoming appt with  Dr Keene . labs reviewed:CMP, CBCD,  from Dr Constanza mccullough -Discussed taking medication as directed every day around the same time. Do not double up if skipped or missed doses.   -Discussed exercising daily 30 minutes 5 times a week for 150 minutes weekly to help with stress reduction and weight reduction if needed.  -Discussed low sodium diet.  -Discussed limiting caffeine consumption and tobacco cessation and the effects they have on the heart and blood pressure.   Orders:  -     ramipril (ALTACE) 2.5 MG capsule; Take 1 capsule by mouth daily, Disp-90 capsule, R-1Normal  2. Centrilobular emphysema (HCC)  Assessment & Plan:   Monitored by specialist- no acute findings meriting change in the plan follows with Dr Forman recent notes reviewed and stable continue current medications per pulmonary Anoro refilled today and will get recent CT chest   Orders:  -     ANORO ELLIPTA 62.5-25 MCG/ACT inhaler; Inhale 1 puff into the lungs daily, Disp-3 each, R-0, DAWNormal  -     albuterol sulfate HFA (PROAIR HFA) 108 (90 Base) MCG/ACT inhaler; Inhale 2 puffs into the lungs every 6 hours as needed for Wheezing, Disp-1 each, R-0Normal  3. Duke's esophagus determined by endoscopy  Assessment & Plan:   Monitored by specialist- no acute findings meriting change in the plan, weight has been stable  Orders:  -     omeprazole (PRILOSEC) 40 MG delayed release capsule; Take 1 capsule by mouth daily, Disp-90 capsule, R-1Normal  4. Chronic bilateral low back pain with left-sided sciatica  Assessment &

## 2024-11-18 ENCOUNTER — OFFICE VISIT (OUTPATIENT)
Dept: FAMILY MEDICINE CLINIC | Age: 74
End: 2024-11-18

## 2024-11-18 ENCOUNTER — TELEPHONE (OUTPATIENT)
Dept: FAMILY MEDICINE CLINIC | Age: 74
End: 2024-11-18

## 2024-11-18 VITALS
OXYGEN SATURATION: 96 % | SYSTOLIC BLOOD PRESSURE: 120 MMHG | TEMPERATURE: 97.6 F | DIASTOLIC BLOOD PRESSURE: 66 MMHG | BODY MASS INDEX: 23.85 KG/M2 | WEIGHT: 170.4 LBS | HEIGHT: 71 IN | HEART RATE: 76 BPM | RESPIRATION RATE: 16 BRPM

## 2024-11-18 DIAGNOSIS — Z12.11 SCREENING FOR COLORECTAL CANCER: ICD-10-CM

## 2024-11-18 DIAGNOSIS — Z12.5 SCREENING FOR MALIGNANT NEOPLASM OF PROSTATE: ICD-10-CM

## 2024-11-18 DIAGNOSIS — Z12.12 SCREENING FOR COLORECTAL CANCER: ICD-10-CM

## 2024-11-18 DIAGNOSIS — Z00.00 MEDICARE ANNUAL WELLNESS VISIT, SUBSEQUENT: Primary | ICD-10-CM

## 2024-11-18 DIAGNOSIS — I10 ESSENTIAL HYPERTENSION: ICD-10-CM

## 2024-11-18 DIAGNOSIS — Z29.11 NEED FOR PROPHYLACTIC VACCINATION AND INOCULATION AGAINST RESPIRATORY SYNCYTIAL VIRUS (RSV): ICD-10-CM

## 2024-11-18 DIAGNOSIS — Z87.891 PERSONAL HISTORY OF TOBACCO USE, PRESENTING HAZARDS TO HEALTH: ICD-10-CM

## 2024-11-18 DIAGNOSIS — Z13.6 SCREENING FOR CARDIOVASCULAR CONDITION: ICD-10-CM

## 2024-11-18 DIAGNOSIS — M89.8X9 BONE MASS: ICD-10-CM

## 2024-11-18 RX ORDER — RESPIRATORY SYNCYTIAL VISUS VACCINE RECOMBINANT, ADJUVANTED 120MCG/0.5
0.5 KIT INTRAMUSCULAR ONCE
Qty: 0.5 ML | Refills: 0 | Status: SHIPPED | OUTPATIENT
Start: 2024-11-18 | End: 2024-11-18

## 2024-11-18 SDOH — ECONOMIC STABILITY: FOOD INSECURITY: WITHIN THE PAST 12 MONTHS, YOU WORRIED THAT YOUR FOOD WOULD RUN OUT BEFORE YOU GOT MONEY TO BUY MORE.: NEVER TRUE

## 2024-11-18 SDOH — ECONOMIC STABILITY: FOOD INSECURITY: WITHIN THE PAST 12 MONTHS, THE FOOD YOU BOUGHT JUST DIDN'T LAST AND YOU DIDN'T HAVE MONEY TO GET MORE.: NEVER TRUE

## 2024-11-18 SDOH — ECONOMIC STABILITY: INCOME INSECURITY: HOW HARD IS IT FOR YOU TO PAY FOR THE VERY BASICS LIKE FOOD, HOUSING, MEDICAL CARE, AND HEATING?: NOT HARD AT ALL

## 2024-11-18 ASSESSMENT — LIFESTYLE VARIABLES
HOW OFTEN DO YOU HAVE A DRINK CONTAINING ALCOHOL: MONTHLY OR LESS
HOW MANY STANDARD DRINKS CONTAINING ALCOHOL DO YOU HAVE ON A TYPICAL DAY: 1 OR 2

## 2024-11-18 ASSESSMENT — PATIENT HEALTH QUESTIONNAIRE - PHQ9
1. LITTLE INTEREST OR PLEASURE IN DOING THINGS: NOT AT ALL
SUM OF ALL RESPONSES TO PHQ9 QUESTIONS 1 & 2: 0
2. FEELING DOWN, DEPRESSED OR HOPELESS: NOT AT ALL
SUM OF ALL RESPONSES TO PHQ QUESTIONS 1-9: 0

## 2024-11-18 NOTE — PROGRESS NOTES
in no acute distress  Skin: warm and dry, no rash or erythema, hard mass noted over the left anterior 3/4 rib on exam  Head: normocephalic and atraumatic  Eyes: pupils equal, round, and reactive to light, extraocular eye movements intact, conjunctivae normal  ENT: tympanic membrane, external ear and ear canal normal bilaterally, nose without deformity, nasal mucosa and turbinates normal without polyps  Neck: supple and non-tender without mass, no thyromegaly or thyroid nodules, no cervical lymphadenopathy  Pulmonary/Chest: clear to auscultation bilaterally- no wheezes, rales or rhonchi, normal air movement, no respiratory distress  Cardiovascular: normal rate, regular rhythm, normal S1 and S2, no murmurs, rubs, clicks, or gallops, distal pulses intact, no carotid bruits  Abdomen: soft, non-tender, non-distended, normal bowel sounds, no masses or organomegaly  Extremities: no cyanosis, clubbing or edema  Musculoskeletal: normal range of motion, no joint swelling, scoliosis deformity and left  anterior 3/4 rib has a hard mass no  tenderness,   Neurologic: reflexes normal and symmetric, no cranial nerve deficit, gait, coordination and speech normal            Allergies   Allergen Reactions    Penicillins Hives and Swelling     Prior to Visit Medications    Medication Sig Taking? Authorizing Provider   respiratory syncytial vaccine, adjuvanted (AREXVY) 120 MCG/0.5ML injection Inject 0.5 mLs into the muscle once for 1 dose Yes Ana Robbins APRN - CNP   ferrous sulfate (FE TABS 325) 325 (65 Fe) MG EC tablet Take 1 tablet by mouth 3 times daily (with meals) Yes Provider, MD Jimmy   baclofen (LIORESAL) 10 MG tablet Take 1 tablet by mouth nightly as needed (muscle spasms) Yes Ana Robbins APRN - CNP   ANORO ELLIPTA 62.5-25 MCG/ACT inhaler Inhale 1 puff into the lungs daily Yes Ana Robbins APRN - CNP   albuterol sulfate HFA (PROAIR HFA) 108 (90 Base) MCG/ACT inhaler Inhale 2 puffs into the lungs every 6 hours

## 2024-11-18 NOTE — TELEPHONE ENCOUNTER
----- Message from CAITLIN Rivera CNP sent at 11/18/2024 11:55 AM EST -----  Please notify patient that their x ray results show arthritis but no bone mass

## 2024-11-18 NOTE — TELEPHONE ENCOUNTER
Left detailed voicemail asking pt to call back at office to discuss xray results. Will try calling pt again at a later time.

## 2024-11-18 NOTE — TELEPHONE ENCOUNTER
Notified pt that xray results show arthritis but no bone mass. Pt verbalized understanding and will follow up with Dr. Beauchamp.

## 2024-12-02 ENCOUNTER — TELEPHONE (OUTPATIENT)
Dept: FAMILY MEDICINE CLINIC | Age: 74
End: 2024-12-02

## 2024-12-02 DIAGNOSIS — Z12.11 SCREENING FOR COLORECTAL CANCER: ICD-10-CM

## 2024-12-02 DIAGNOSIS — I10 ESSENTIAL HYPERTENSION: ICD-10-CM

## 2024-12-02 DIAGNOSIS — R19.5 POSITIVE FIT (FECAL IMMUNOCHEMICAL TEST): Primary | ICD-10-CM

## 2024-12-02 DIAGNOSIS — Z13.6 SCREENING FOR CARDIOVASCULAR CONDITION: ICD-10-CM

## 2024-12-02 DIAGNOSIS — Z12.12 SCREENING FOR COLORECTAL CANCER: ICD-10-CM

## 2024-12-02 DIAGNOSIS — Z12.5 SCREENING FOR MALIGNANT NEOPLASM OF PROSTATE: ICD-10-CM

## 2024-12-02 LAB
ALBUMIN: 4.3 G/DL (ref 3.5–5.2)
ALP BLD-CCNC: 68 U/L (ref 40–129)
ALT SERPL-CCNC: 9 U/L (ref 0–40)
ANION GAP SERPL CALCULATED.3IONS-SCNC: 9 MMOL/L (ref 7–16)
AST SERPL-CCNC: 11 U/L (ref 0–39)
BASOPHILS ABSOLUTE: 0.08 K/UL (ref 0–0.2)
BASOPHILS RELATIVE PERCENT: 1 % (ref 0–2)
BILIRUB SERPL-MCNC: 0.5 MG/DL (ref 0–1.2)
BUN BLDV-MCNC: 17 MG/DL (ref 6–23)
CALCIUM SERPL-MCNC: 9.6 MG/DL (ref 8.6–10.2)
CHLORIDE BLD-SCNC: 103 MMOL/L (ref 98–107)
CHOLESTEROL, TOTAL: 176 MG/DL
CO2: 28 MMOL/L (ref 22–29)
CONTROL: ABNORMAL
CREAT SERPL-MCNC: 1.1 MG/DL (ref 0.7–1.2)
EOSINOPHILS ABSOLUTE: 0.17 K/UL (ref 0.05–0.5)
EOSINOPHILS RELATIVE PERCENT: 2 % (ref 0–6)
FECAL BLOOD IMMUNOCHEMICAL TEST: POSITIVE
GFR, ESTIMATED: 71 ML/MIN/1.73M2
GLUCOSE BLD-MCNC: 95 MG/DL (ref 74–99)
HCT VFR BLD CALC: 44.5 % (ref 37–54)
HDLC SERPL-MCNC: 44 MG/DL
HEMOGLOBIN: 14.2 G/DL (ref 12.5–16.5)
IMMATURE GRANULOCYTES %: 0 % (ref 0–5)
IMMATURE GRANULOCYTES ABSOLUTE: <0.03 K/UL (ref 0–0.58)
LDL CHOLESTEROL: 116 MG/DL
LYMPHOCYTES ABSOLUTE: 2.6 K/UL (ref 1.5–4)
LYMPHOCYTES RELATIVE PERCENT: 34 % (ref 20–42)
MCH RBC QN AUTO: 30.3 PG (ref 26–35)
MCHC RBC AUTO-ENTMCNC: 31.9 G/DL (ref 32–34.5)
MCV RBC AUTO: 95.1 FL (ref 80–99.9)
MONOCYTES ABSOLUTE: 0.76 K/UL (ref 0.1–0.95)
MONOCYTES RELATIVE PERCENT: 10 % (ref 2–12)
NEUTROPHILS ABSOLUTE: 4 K/UL (ref 1.8–7.3)
NEUTROPHILS RELATIVE PERCENT: 52 % (ref 43–80)
PDW BLD-RTO: 15.3 % (ref 11.5–15)
PLATELET # BLD: 321 K/UL (ref 130–450)
PMV BLD AUTO: 9.8 FL (ref 7–12)
POTASSIUM SERPL-SCNC: 4.7 MMOL/L (ref 3.5–5)
PROSTATE SPECIFIC ANTIGEN: 1.31 NG/ML (ref 0–4)
RBC # BLD: 4.68 M/UL (ref 3.8–5.8)
SODIUM BLD-SCNC: 140 MMOL/L (ref 132–146)
TOTAL PROTEIN: 6.5 G/DL (ref 6.4–8.3)
TRIGL SERPL-MCNC: 78 MG/DL
VLDLC SERPL CALC-MCNC: 16 MG/DL
WBC # BLD: 7.6 K/UL (ref 4.5–11.5)

## 2024-12-02 PROCEDURE — 82274 ASSAY TEST FOR BLOOD FECAL: CPT | Performed by: NURSE PRACTITIONER

## 2024-12-02 NOTE — TELEPHONE ENCOUNTER
I spoke with Norm [phone] Positive FIT test and recommend he contact [facility] for colonoscopy with his history of cancer and he stated he will call and make an appointment. (I will put it on the list has upcoming appt with oncology on Friday) Ana TUCKER-CNP 12/2/24

## 2024-12-02 NOTE — ASSESSMENT & PLAN NOTE
I spoke with Norm [phone] Positive FIT test and recommend he contact [facility] for colonoscopy with his history of cancer and he stated he will call and make an appointment. (I will put it on the list has upcoming appt with oncology on Friday) Ana TUCKER-CNP 12/2/24

## 2024-12-02 NOTE — RESULT ENCOUNTER NOTE
I spoke with Norm [phone] Positive FIT test and recommend he contact [facility] for colonoscopy with his history of cancer and he stated he will call and make an appointment. (I will put it on the list has upcoming appt with oncology on Friday) Ana TUCKER-CNP 12/2/24

## 2024-12-03 NOTE — RESULT ENCOUNTER NOTE
Please notify patient that their lab results are stable his cholesterol is a little higher would he consider taking a statin? Fax labs to DR Keene cardiologist.

## 2024-12-13 ENCOUNTER — PATIENT MESSAGE (OUTPATIENT)
Dept: FAMILY MEDICINE CLINIC | Age: 74
End: 2024-12-13

## 2024-12-16 NOTE — TELEPHONE ENCOUNTER
Pt wanted Rx to be sent to [facility]. Verbal order was placed for one movie prep kit per Ana Robbins.

## 2025-01-23 DIAGNOSIS — I10 ESSENTIAL HYPERTENSION: ICD-10-CM

## 2025-01-23 RX ORDER — RAMIPRIL 2.5 MG/1
2.5 CAPSULE ORAL DAILY
Qty: 90 CAPSULE | Refills: 0 | Status: SHIPPED | OUTPATIENT
Start: 2025-01-23

## 2025-01-23 NOTE — TELEPHONE ENCOUNTER
Name of Medication(s) Requested:  Requested Prescriptions     Pending Prescriptions Disp Refills    ramipril (ALTACE) 2.5 MG capsule [Pharmacy Med Name: RAMIPRIL 2.5 MG CAPSULE] 90 capsule 0     Sig: Take 1 capsule by mouth daily       Medication is on current medication list Yes    Dosage and directions were verified? Yes    Quantity verified: 90 day supply     Pharmacy Verified?  Yes    Last Appointment:  11/18/2024    Future appts:  Future Appointments   Date Time Provider Department Center   2/20/2025 10:00 AM Blessing Beauchamp MD [facility] DEP   12/2/2025  9:40 AM Blessing Beauchamp MD [facility] DEP        (If no appt send self scheduling link. .REFILLAPPT)  Scheduling request sent?     [] Yes  [x] No    Does patient need updated?  [] Yes  [x] No

## 2025-02-20 ENCOUNTER — OFFICE VISIT (OUTPATIENT)
Dept: FAMILY MEDICINE CLINIC | Age: 75
End: 2025-02-20
Payer: MEDICARE

## 2025-02-20 VITALS
SYSTOLIC BLOOD PRESSURE: 109 MMHG | HEIGHT: 71 IN | HEART RATE: 68 BPM | BODY MASS INDEX: 23.8 KG/M2 | OXYGEN SATURATION: 95 % | DIASTOLIC BLOOD PRESSURE: 64 MMHG | TEMPERATURE: 97.5 F | WEIGHT: 170 LBS | RESPIRATION RATE: 16 BRPM

## 2025-02-20 DIAGNOSIS — G89.29 CHRONIC BILATERAL LOW BACK PAIN WITH LEFT-SIDED SCIATICA: ICD-10-CM

## 2025-02-20 DIAGNOSIS — J43.9 NOCTURNAL HYPOXEMIA DUE TO EMPHYSEMA (HCC): ICD-10-CM

## 2025-02-20 DIAGNOSIS — Z87.891 PERSONAL HISTORY OF TOBACCO USE, PRESENTING HAZARDS TO HEALTH: ICD-10-CM

## 2025-02-20 DIAGNOSIS — C83.00 SMALL LYMPHOCYTIC LYMPHOMA (HCC): Primary | ICD-10-CM

## 2025-02-20 DIAGNOSIS — G47.36 NOCTURNAL HYPOXEMIA DUE TO EMPHYSEMA (HCC): ICD-10-CM

## 2025-02-20 DIAGNOSIS — Z85.038 HISTORY OF COLON CANCER: ICD-10-CM

## 2025-02-20 DIAGNOSIS — M54.42 CHRONIC BILATERAL LOW BACK PAIN WITH LEFT-SIDED SCIATICA: ICD-10-CM

## 2025-02-20 DIAGNOSIS — I48.0 PAROXYSMAL ATRIAL FIBRILLATION (HCC): ICD-10-CM

## 2025-02-20 DIAGNOSIS — J43.2 CENTRILOBULAR EMPHYSEMA (HCC): ICD-10-CM

## 2025-02-20 DIAGNOSIS — K22.70 BARRETT'S ESOPHAGUS DETERMINED BY ENDOSCOPY: ICD-10-CM

## 2025-02-20 DIAGNOSIS — I10 ESSENTIAL HYPERTENSION: ICD-10-CM

## 2025-02-20 DIAGNOSIS — G47.33 OBSTRUCTIVE SLEEP APNEA TREATED WITH CONTINUOUS POSITIVE AIRWAY PRESSURE (CPAP): ICD-10-CM

## 2025-02-20 PROCEDURE — G2211 COMPLEX E/M VISIT ADD ON: HCPCS | Performed by: FAMILY MEDICINE

## 2025-02-20 PROCEDURE — 1123F ACP DISCUSS/DSCN MKR DOCD: CPT | Performed by: FAMILY MEDICINE

## 2025-02-20 PROCEDURE — 99214 OFFICE O/P EST MOD 30 MIN: CPT | Performed by: FAMILY MEDICINE

## 2025-02-20 PROCEDURE — 3078F DIAST BP <80 MM HG: CPT | Performed by: FAMILY MEDICINE

## 2025-02-20 PROCEDURE — 3074F SYST BP LT 130 MM HG: CPT | Performed by: FAMILY MEDICINE

## 2025-02-20 PROCEDURE — 1159F MED LIST DOCD IN RCRD: CPT | Performed by: FAMILY MEDICINE

## 2025-02-20 PROCEDURE — 1160F RVW MEDS BY RX/DR IN RCRD: CPT | Performed by: FAMILY MEDICINE

## 2025-02-20 RX ORDER — BACLOFEN 10 MG/1
10 TABLET ORAL NIGHTLY PRN
Qty: 90 TABLET | Refills: 3 | Status: SHIPPED | OUTPATIENT
Start: 2025-02-20

## 2025-02-20 RX ORDER — RAMIPRIL 2.5 MG/1
2.5 CAPSULE ORAL DAILY
Qty: 90 CAPSULE | Refills: 3 | Status: SHIPPED | OUTPATIENT
Start: 2025-02-20

## 2025-02-20 RX ORDER — UMECLIDINIUM BROMIDE AND VILANTEROL TRIFENATATE 62.5; 25 UG/1; UG/1
1 POWDER RESPIRATORY (INHALATION) DAILY
Qty: 3 EACH | Refills: 3 | Status: SHIPPED | OUTPATIENT
Start: 2025-02-20

## 2025-02-20 RX ORDER — OMEPRAZOLE 40 MG/1
40 CAPSULE, DELAYED RELEASE ORAL DAILY
Qty: 90 CAPSULE | Refills: 3 | Status: SHIPPED | OUTPATIENT
Start: 2025-02-20

## 2025-02-20 SDOH — ECONOMIC STABILITY: FOOD INSECURITY: WITHIN THE PAST 12 MONTHS, YOU WORRIED THAT YOUR FOOD WOULD RUN OUT BEFORE YOU GOT MONEY TO BUY MORE.: NEVER TRUE

## 2025-02-20 SDOH — ECONOMIC STABILITY: FOOD INSECURITY: WITHIN THE PAST 12 MONTHS, THE FOOD YOU BOUGHT JUST DIDN'T LAST AND YOU DIDN'T HAVE MONEY TO GET MORE.: NEVER TRUE

## 2025-02-20 ASSESSMENT — PATIENT HEALTH QUESTIONNAIRE - PHQ9
SUM OF ALL RESPONSES TO PHQ QUESTIONS 1-9: 0
1. LITTLE INTEREST OR PLEASURE IN DOING THINGS: NOT AT ALL
SUM OF ALL RESPONSES TO PHQ QUESTIONS 1-9: 0
2. FEELING DOWN, DEPRESSED OR HOPELESS: NOT AT ALL
SUM OF ALL RESPONSES TO PHQ9 QUESTIONS 1 & 2: 0
SUM OF ALL RESPONSES TO PHQ QUESTIONS 1-9: 0
SUM OF ALL RESPONSES TO PHQ QUESTIONS 1-9: 0

## 2025-02-20 NOTE — PROGRESS NOTES
[facility]  [address], [place] [address]  Phone: [phone]        Patient:  Tyrese Vaughn 74 y.o. male          Chief complaint:   Chief Complaint   Patient presents with    New Patient     Previous PCP Ana Robbins     Health Maintenance     Medicare AWV-due        Assessment and Plan     Tyrese was seen today for new patient and health maintenance.    Diagnoses and all orders for this visit:    Small lymphocytic lymphoma (HCC)    Centrilobular emphysema (HCC)  -     ANORO ELLIPTA 62.5-25 MCG/ACT inhaler; Inhale 1 puff into the lungs daily    Chronic bilateral low back pain with left-sided sciatica  -     baclofen (LIORESAL) 10 MG tablet; Take 1 tablet by mouth nightly as needed (muscle spasms)    Duke's esophagus determined by endoscopy  -     omeprazole (PRILOSEC) 40 MG delayed release capsule; Take 1 capsule by mouth daily    Essential hypertension  -     ramipril (ALTACE) 2.5 MG capsule; Take 1 capsule by mouth daily    History of colon cancer    Paroxysmal atrial fibrillation (HCC)    Nocturnal hypoxemia due to emphysema (HCC)    Obstructive sleep apnea treated with continuous positive airway pressure (CPAP)        Assessment & Plan  1. Atrial Fibrillation.  Chronic and stable  He has a history of atrial fibrillation and is currently under the care of Dr. Keene, with an upcoming appointment in May or June 2025.    2. Pulmonary Hypertension. .  Chronic and stable  He has a history of pulmonary hypertension, which was mild on the last echocardiogram in 2022.    3. Chronic Obstructive Pulmonary Disease (COPD).  Chronic and stable  He is currently managed with inhalers and reports feeling okay on the medications. He is due for his annual CT scan and will contact his pulmonologist for scheduling.    4. Sleep Apnea. Chronic and stable  He has been using a CPAP machine for 15-20 years.    5. Duke's Esophagus. Chronic and stable  He has a history of Duke's

## 2025-06-09 LAB
ALBUMIN: 4.3 G/DL
ALP BLD-CCNC: 58 U/L
ALT SERPL-CCNC: 9 U/L
ANION GAP SERPL CALCULATED.3IONS-SCNC: 2.2 MMOL/L
AST SERPL-CCNC: 17 U/L
BASOPHILS ABSOLUTE: 39 /ΜL
BASOPHILS RELATIVE PERCENT: 0.5 %
BILIRUB SERPL-MCNC: 0.6 MG/DL (ref 0.1–1.4)
BUN BLDV-MCNC: 16 MG/DL
CALCIUM SERPL-MCNC: 8.8 MG/DL
CHLORIDE BLD-SCNC: 105 MMOL/L
CO2: 25 MMOL/L
CREAT SERPL-MCNC: 1.01 MG/DL
EOSINOPHILS ABSOLUTE: 193 /ΜL
EOSINOPHILS RELATIVE PERCENT: 2.5 %
GFR, ESTIMATED: ABNORMAL
GLUCOSE BLD-MCNC: 90 MG/DL
HCT VFR BLD CALC: 39.5 % (ref 41–53)
HEMOGLOBIN: 13.4 G/DL (ref 13.5–17.5)
LYMPHOCYTES ABSOLUTE: 2995 /ΜL
LYMPHOCYTES RELATIVE PERCENT: 38.9 %
MCH RBC QN AUTO: 32.6 PG
MCHC RBC AUTO-ENTMCNC: 33.9 G/DL
MCV RBC AUTO: 96.1 FL
MONOCYTES ABSOLUTE: 878 /ΜL
MONOCYTES RELATIVE PERCENT: 11.4 %
NEUTROPHILS ABSOLUTE: ABNORMAL
NEUTROPHILS RELATIVE PERCENT: 46.7 %
PLATELET # BLD: 298 K/ΜL
PMV BLD AUTO: 9.3 FL
POTASSIUM SERPL-SCNC: 4.6 MMOL/L
RBC # BLD: 4.11 10^6/ΜL
SODIUM BLD-SCNC: 142 MMOL/L
TOTAL PROTEIN: 6.3 G/DL (ref 6.4–8.2)
WBC # BLD: 7.7 10^3/ML

## 2025-06-24 ENCOUNTER — OFFICE VISIT (OUTPATIENT)
Dept: FAMILY MEDICINE CLINIC | Age: 75
End: 2025-06-24
Payer: MEDICARE

## 2025-06-24 VITALS
SYSTOLIC BLOOD PRESSURE: 108 MMHG | DIASTOLIC BLOOD PRESSURE: 52 MMHG | WEIGHT: 167.5 LBS | HEART RATE: 80 BPM | BODY MASS INDEX: 23.45 KG/M2 | TEMPERATURE: 98.1 F | RESPIRATION RATE: 16 BRPM | OXYGEN SATURATION: 95 % | HEIGHT: 71 IN

## 2025-06-24 DIAGNOSIS — C83.00 SMALL LYMPHOCYTIC LYMPHOMA (HCC): ICD-10-CM

## 2025-06-24 DIAGNOSIS — M25.562 CHRONIC PAIN OF LEFT KNEE: ICD-10-CM

## 2025-06-24 DIAGNOSIS — J43.2 CENTRILOBULAR EMPHYSEMA (HCC): ICD-10-CM

## 2025-06-24 DIAGNOSIS — Z90.81 ASPLENIA AFTER SURGICAL PROCEDURE: ICD-10-CM

## 2025-06-24 DIAGNOSIS — H91.90 HEARING DIFFICULTY, UNSPECIFIED LATERALITY: Primary | ICD-10-CM

## 2025-06-24 DIAGNOSIS — K22.70 BARRETT'S ESOPHAGUS DETERMINED BY ENDOSCOPY: ICD-10-CM

## 2025-06-24 DIAGNOSIS — I10 ESSENTIAL HYPERTENSION: ICD-10-CM

## 2025-06-24 DIAGNOSIS — G89.29 CHRONIC PAIN OF LEFT KNEE: ICD-10-CM

## 2025-06-24 PROCEDURE — 1159F MED LIST DOCD IN RCRD: CPT | Performed by: FAMILY MEDICINE

## 2025-06-24 PROCEDURE — G2211 COMPLEX E/M VISIT ADD ON: HCPCS | Performed by: FAMILY MEDICINE

## 2025-06-24 PROCEDURE — 3074F SYST BP LT 130 MM HG: CPT | Performed by: FAMILY MEDICINE

## 2025-06-24 PROCEDURE — 3078F DIAST BP <80 MM HG: CPT | Performed by: FAMILY MEDICINE

## 2025-06-24 PROCEDURE — 1123F ACP DISCUSS/DSCN MKR DOCD: CPT | Performed by: FAMILY MEDICINE

## 2025-06-24 PROCEDURE — 1160F RVW MEDS BY RX/DR IN RCRD: CPT | Performed by: FAMILY MEDICINE

## 2025-06-24 PROCEDURE — 99214 OFFICE O/P EST MOD 30 MIN: CPT | Performed by: FAMILY MEDICINE

## 2025-06-24 NOTE — PROGRESS NOTES
[facility]  [address], [place] [address]  Phone: [phone]        Patient:  Tyrese Vaughn 75 y.o. male          Chief complaint:   Chief Complaint   Patient presents with    Follow-up    Hypertension    Duke's Esophagus       Assessment and Plan     Hearing difficulty, unspecified laterality  -     Gino Dunbar AU.D, Audiology, [place]  Centrilobular emphysema (HCC)  Chronic pain of left knee  -     XR KNEE LEFT (MIN 4 VIEWS); Future  -     [facility] - Mnauel Whipple DO, Sports Medicine, [place]  Essential hypertension  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  Duke's esophagus determined by endoscopy  Small lymphocytic lymphoma (HCC)       Assessment & Plan  Left knee pain: Chronic and not controlled  - Likely due to awkward sleeping position; history of reduced cartilage, flares up occasionally  - No recent orthopedic visits; get xray, exercises, referral for potential injection due to history of barretts esophagus  - Discussed knee injections to manage pain without NSAIDs  - Advised to avoid NSAIDs due to potential esophageal damage    COPD: Chronic and stable  - Stable; no albuterol use since last visit  - Continues Anoro  - Advised to continue current regimen and monitor symptoms    Hearing issues: Chronic and not controlled  - Hearing aid cuts out with jaw movement; suspect muscle involvement  - Referral to audiology for evaluation and recommendations  - Discussed ENT evaluation if audiology does not resolve issue    Lymphoma: Chronic and stable  - Following with hematology    Health maintenance/asplenia  - Due for shingles vaccine; advised to receive it  - Due for COVID-19 booster; recommended given lung history  - Due for prevnar  - Will need menactra every 5 years, due in December and Men B every 2-3--can give next August  - Confirmed RSV vaccine received this year    Duke's esophagus, chronic and stable  - Continue PPI  - Stop

## 2025-06-26 ENCOUNTER — OFFICE VISIT (OUTPATIENT)
Age: 75
End: 2025-06-26
Payer: MEDICARE

## 2025-06-26 DIAGNOSIS — M17.0 BILATERAL PRIMARY OSTEOARTHRITIS OF KNEE: Primary | ICD-10-CM

## 2025-06-26 PROCEDURE — 1125F AMNT PAIN NOTED PAIN PRSNT: CPT | Performed by: ORTHOPAEDIC SURGERY

## 2025-06-26 PROCEDURE — 1123F ACP DISCUSS/DSCN MKR DOCD: CPT | Performed by: ORTHOPAEDIC SURGERY

## 2025-06-26 PROCEDURE — 1159F MED LIST DOCD IN RCRD: CPT | Performed by: ORTHOPAEDIC SURGERY

## 2025-06-26 PROCEDURE — 99203 OFFICE O/P NEW LOW 30 MIN: CPT | Performed by: ORTHOPAEDIC SURGERY

## 2025-06-26 RX ORDER — MELOXICAM 15 MG/1
15 TABLET ORAL DAILY PRN
Qty: 90 TABLET | Refills: 1 | Status: SHIPPED | OUTPATIENT
Start: 2025-06-26

## 2025-06-26 NOTE — PROGRESS NOTES
Chief Complaint   Patient presents with    New Patient     Patient presents into the office today for an evaluation of the L knee. He states 4 days ago he woke up with sever pain. Patient notes he injured his knee 30 years ago causing 30 percent of cartilage to be removed.     Knee Pain     Patient describes the pain as aching / sharp. He reports tingling due to sciatica. Patient tried wearing a knee brace. He does treat the pain with Tylenol / ibuprofen as needed. Patient rates the level of pain at a 1 / 10        Tyrese Vaughn returns today for follow-up of bilateral knee weakness.  He has had difficulty with his left knee especially over the past several days.  He has had multiple knee arthroscopies over the years both knees.  The only problem he is really experience was inability to straighten his knee out the hallway.  He does not take anti-inflammatories.  After his last abdominal surgery he really lost a lot of strength and endurance..      Past Medical History:   Diagnosis Date    CAD (coronary artery disease)     Colon adenocarcinoma (HCC) 1/30/2018    COPD (chronic obstructive pulmonary disease) (HCC)     Elevated triglycerides with high cholesterol 1/10/2017    Emphysema of lung (HCC)     GERD (gastroesophageal reflux disease)     Hx of splenectomy 6/13/2017    40 years ago ruptured    Hypertension     Intestinal obstruction (HCC) 11/15/2022    Macular degeneration, bilateral 03/2022    follows [facility]     Multiple lung nodules on CT 2017    Neuropathy     Osteoarthritis     Polyp of colon 12/15/2017    Robotic-assist Low anterior rectosigmoid resection 1/8/2018 DR. D'Amico Pathology invasive moderately differentiated  adenocarcinoma arising in association with tubulovillous adenoma 5 lymph nodes removed and negative for metastatic carcinoma    Pulmonary hypertension (HCC) 1/10/2020    Echo Dr Keene 12/30/19 SR, Left ventricular size is normal, mild concentric LVH, EF 55 - 60% Diastolic

## 2025-07-01 ENCOUNTER — TELEPHONE (OUTPATIENT)
Dept: AUDIOLOGY | Age: 75
End: 2025-07-01

## 2025-07-01 NOTE — TELEPHONE ENCOUNTER
Referral for Hearing Evaluation received.  Called patient and left a voicemail for patient's family member to call back for scheduling.    Electronically signed by Ericka Irvin on 7/1/25 at 12:57 PM EDT

## 2025-07-03 ENCOUNTER — RESULTS FOLLOW-UP (OUTPATIENT)
Dept: FAMILY MEDICINE CLINIC | Age: 75
End: 2025-07-03

## 2025-07-23 ENCOUNTER — APPOINTMENT (OUTPATIENT)
Dept: GENERAL RADIOLOGY | Age: 75
End: 2025-07-23
Payer: MEDICARE

## 2025-07-23 ENCOUNTER — HOSPITAL ENCOUNTER (EMERGENCY)
Age: 75
Discharge: HOME | End: 2025-07-23
Payer: MEDICARE

## 2025-07-23 VITALS
TEMPERATURE: 98.1 F | OXYGEN SATURATION: 98 % | WEIGHT: 168 LBS | SYSTOLIC BLOOD PRESSURE: 133 MMHG | RESPIRATION RATE: 20 BRPM | BODY MASS INDEX: 23.44 KG/M2 | HEART RATE: 51 BPM | DIASTOLIC BLOOD PRESSURE: 69 MMHG

## 2025-07-23 DIAGNOSIS — Z51.89 VISIT FOR WOUND CHECK: Primary | ICD-10-CM

## 2025-07-23 PROCEDURE — 6360000002 HC RX W HCPCS: Performed by: PHYSICIAN ASSISTANT

## 2025-07-23 PROCEDURE — 99211 OFF/OP EST MAY X REQ PHY/QHP: CPT

## 2025-07-23 PROCEDURE — 73650 X-RAY EXAM OF HEEL: CPT

## 2025-07-23 RX ORDER — LIDOCAINE HYDROCHLORIDE 10 MG/ML
5 INJECTION, SOLUTION INFILTRATION; PERINEURAL ONCE
Status: COMPLETED | OUTPATIENT
Start: 2025-07-23 | End: 2025-07-23

## 2025-07-23 RX ORDER — CLINDAMYCIN HYDROCHLORIDE 300 MG/1
300 CAPSULE ORAL 3 TIMES DAILY
Qty: 21 CAPSULE | Refills: 0 | Status: SHIPPED | OUTPATIENT
Start: 2025-07-23 | End: 2025-07-30

## 2025-07-23 RX ADMIN — LIDOCAINE HYDROCHLORIDE 5 ML: 10 INJECTION, SOLUTION INFILTRATION; PERINEURAL at 12:57

## 2025-07-23 ASSESSMENT — PAIN - FUNCTIONAL ASSESSMENT: PAIN_FUNCTIONAL_ASSESSMENT: 0-10

## 2025-07-23 ASSESSMENT — PAIN SCALES - GENERAL: PAINLEVEL_OUTOF10: 0

## 2025-07-23 NOTE — ED PROVIDER NOTES
[facility]     NAME: Tyrese Vaughn  :  1950  MRN:  [id number]  Date of evaluation: 2025  Provider: Vijay Beebe PA-C  PCP: Blessing Beauchamp MD  Note Started : 12:39 PM EDT 25    Chief Complaint: Foreign Body (Fb  in left foot for over a week. )      This is a 75-year-old male that presents to urgent care complaining of a possible foreign body in the left heel this past week.  He states he had felt something in his shoe and he thought maybe it got embedded in his skin.  Complains of localized pain in the left heel.  Denies any fevers or chills.  No limb swelling.  Patient last tetanus is less 5 years ago.  He denies any numbness or tingling.  No bony pain.  On first contact patient he appears to be in no acute distress.        Review of Systems  Pertinent positives and negatives are stated within HPI, all other systems reviewed and are negative.     Allergies: Penicillins     --------------------------------------------- PAST HISTORY ---------------------------------------------  Past Medical History:  has a past medical history of CAD (coronary artery disease), Colon adenocarcinoma (HCC), COPD (chronic obstructive pulmonary disease) (HCC), Elevated triglycerides with high cholesterol, Emphysema of lung (HCC), GERD (gastroesophageal reflux disease), Hx of splenectomy, Hypertension, Intestinal obstruction (HCC), Macular degeneration, bilateral, Multiple lung nodules on CT, Neuropathy, Osteoarthritis, Polyp of colon, Pulmonary hypertension (HCC), SBO (small bowel obstruction) (HCC), Sleep apnea, Tick bite of abdomen, and Valvular heart disease.    Past Surgical History:  has a past surgical history that includes Splenectomy; Hemorrhoid surgery; Tonsillectomy and adenoidectomy; Cardiac catheterization; eye surgery (Left, 2022); and colectomy (N/A, 2022).    Social History:  reports that he has been smoking cigarettes. He started smoking about 63 years ago. He

## 2025-07-29 ENCOUNTER — TELEPHONE (OUTPATIENT)
Dept: AUDIOLOGY | Age: 75
End: 2025-07-29

## 2025-07-29 ENCOUNTER — PROCEDURE VISIT (OUTPATIENT)
Dept: AUDIOLOGY | Age: 75
End: 2025-07-29
Payer: MEDICARE

## 2025-07-29 DIAGNOSIS — H90.3 SENSORINEURAL HEARING LOSS, BILATERAL: ICD-10-CM

## 2025-07-29 DIAGNOSIS — H72.92 PERFORATION OF LEFT TYMPANIC MEMBRANE: Primary | ICD-10-CM

## 2025-07-29 PROCEDURE — 92557 COMPREHENSIVE HEARING TEST: CPT | Performed by: AUDIOLOGIST

## 2025-07-29 PROCEDURE — 92567 TYMPANOMETRY: CPT | Performed by: AUDIOLOGIST

## 2025-07-29 NOTE — TELEPHONE ENCOUNTER
Left message to have Normal Vaughn  his cell phone at the [facility] office.it was left in the Audiology office.    Gino Sutton CCC/RUDOLPH  Audiologist  [id number]  NPI#:  [id number]

## 2025-07-31 DIAGNOSIS — H91.90 HEARING DIFFICULTY, UNSPECIFIED LATERALITY: Primary | ICD-10-CM

## 2025-07-31 NOTE — PROGRESS NOTES
This patient was referred for audiometric and tympanometric testing by his PCP, due to a longstanding decrease in hearing sensitivity, with a possible TM perforation, left ear.     Audiometry using pure tone air and bone conduction testing revealed a mild-to-moderate  sensorineural hearing loss, through the frequency range, bilaterally. Reliability was good. Speech reception thresholds were in good agreement with the pure tone averages, bilaterally. Speech discrimination scores were 92%, bilaterally at 80dBHL.    Tympanometry revealed large physical volume, bilaterally.    The results were reviewed with the patient and ordering provider.     An ENT consult is recommended, for this patient.  An Epic message will be sent to PCP, per [place], to enter the referral.    Gino Sutton CCC/RUDOLPH  Audiologist  [id number]  NPI#:  [id number]      Electronically signed by Ericka Irvin on 7/31/2025 at 3:45 PM

## 2025-08-21 ENCOUNTER — OFFICE VISIT (OUTPATIENT)
Age: 75
End: 2025-08-21
Payer: MEDICARE

## 2025-08-21 VITALS
WEIGHT: 168 LBS | HEIGHT: 70 IN | BODY MASS INDEX: 24.05 KG/M2 | TEMPERATURE: 97.9 F | HEART RATE: 54 BPM | RESPIRATION RATE: 18 BRPM | OXYGEN SATURATION: 97 %

## 2025-08-21 DIAGNOSIS — M17.0 BILATERAL PRIMARY OSTEOARTHRITIS OF KNEE: Primary | ICD-10-CM

## 2025-08-21 PROCEDURE — 99213 OFFICE O/P EST LOW 20 MIN: CPT | Performed by: ORTHOPAEDIC SURGERY

## 2025-08-21 PROCEDURE — 1123F ACP DISCUSS/DSCN MKR DOCD: CPT | Performed by: ORTHOPAEDIC SURGERY

## 2025-08-21 PROCEDURE — 1125F AMNT PAIN NOTED PAIN PRSNT: CPT | Performed by: ORTHOPAEDIC SURGERY

## 2025-08-21 PROCEDURE — 1159F MED LIST DOCD IN RCRD: CPT | Performed by: ORTHOPAEDIC SURGERY

## (undated) DEVICE — GLOVE SURG SZ 65 THK91MIL LTX FREE SYN POLYISOPRENE

## (undated) DEVICE — ELECTRODE PT RET AD L9FT HI MOIST COND ADH HYDRGEL CORDED

## (undated) DEVICE — MARKER,SKIN,WI/RULER AND LABELS: Brand: MEDLINE

## (undated) DEVICE — DOUBLE BASIN SET: Brand: MEDLINE INDUSTRIES, INC.

## (undated) DEVICE — PLUMEPORT LAPAROSCOPIC SMOKE FILTRATION DEVICE: Brand: PLUMEPORT ACTIV

## (undated) DEVICE — TUBING, SUCTION, 1/4" X 10', STRAIGHT: Brand: MEDLINE

## (undated) DEVICE — GAUZE,SPONGE,4"X4",16PLY,STRL,LF,10/TRAY: Brand: MEDLINE

## (undated) DEVICE — COVER,TABLE,44X90,STERILE: Brand: MEDLINE

## (undated) DEVICE — 40586 ADVANCED TRENDELENBURG POSITIONING KIT: Brand: 40586 ADVANCED TRENDELENBURG POSITIONING KIT

## (undated) DEVICE — MEDI-VAC YANKAUER SUCTION HANDLE: Brand: CARDINAL HEALTH

## (undated) DEVICE — TOTAL TRAY, 16FR 10ML SIL FOLEY, URN: Brand: MEDLINE

## (undated) DEVICE — SPONGE,LAP,12"X12",XR,ST,5/PK,40PK/CS: Brand: MEDLINE

## (undated) DEVICE — PACK SURG LAP CHOLE CUSTOM

## (undated) DEVICE — STAPLER INT L75MM CUT LN L73MM STPL LN L77MM BLU B FRM 8

## (undated) DEVICE — GENERATOR ELECSURG FORCETRAID

## (undated) DEVICE — CLOTH SURG PREP PREOPERATIVE CHLORHEXIDINE GLUC 2% READYPREP

## (undated) DEVICE — PUMP SUC IRR TBNG L10FT W/ HNDPC ASSEMB STRYKEFLOW 2

## (undated) DEVICE — [HIGH FLOW INSUFFLATOR,  DO NOT USE IF PACKAGE IS DAMAGED,  KEEP DRY,  KEEP AWAY FROM SUNLIGHT,  PROTECT FROM HEAT AND RADIOACTIVE SOURCES.]: Brand: PNEUMOSURE

## (undated) DEVICE — SPONGE LAP W18XL18IN WHT COT 4 PLY FLD STRUNG RADPQ DISP ST

## (undated) DEVICE — NEEDLE HYPO 25GA L1.5IN BLU POLYPR HUB S STL REG BVL STR

## (undated) DEVICE — RELOAD STPL L60MM H1-2.6MM MESENTERY THN TISS WHT 6 ROW

## (undated) DEVICE — KIT BEDSIDE REVITAL OX 500ML

## (undated) DEVICE — COVER,LIGHT HANDLE,FLX,1/PK: Brand: MEDLINE INDUSTRIES, INC.

## (undated) DEVICE — TROCAR: Brand: KII SLEEVE

## (undated) DEVICE — GLOVE ORANGE PI 7 1/2   MSG9075

## (undated) DEVICE — APPLIER CLP M/L SHFT DIA5MM 15 LIG LIGAMAX 5

## (undated) DEVICE — GAUZE,SPONGE,4"X4",16PLY,XRAY,STRL,LF: Brand: MEDLINE

## (undated) DEVICE — SEALER TISS L45CM ADV BPLR STR TIP LAP APPRCH ENSEAL G2

## (undated) DEVICE — LAPAROSCOPIC ACCESS SYSTEM: Brand: ALEXIS LAPAROSCOPIC SYSTEM WITH KII FIOS FIRST ENTRY

## (undated) DEVICE — HYDROPHILIC COATED RED RUBBER URETHRAL CATHETER, SMOOTH ROUNDED TIP, 20 FR (6.7 MM): Brand: DOVER

## (undated) DEVICE — Device

## (undated) DEVICE — GARMENT,MEDLINE,DVT,INT,CALF,MED, GEN2: Brand: MEDLINE

## (undated) DEVICE — GOWN,SIRUS,POLYRNF,BRTHSLV,XLN/XXL,18/CS: Brand: MEDLINE

## (undated) DEVICE — BLADE ES ELASTOMERIC COAT INSUL DURABLE BEND UPTO 90DEG

## (undated) DEVICE — NDL CNTR 40CT FM MAG: Brand: MEDLINE INDUSTRIES, INC.

## (undated) DEVICE — BASIC SINGLE BASIN 1-LF: Brand: MEDLINE INDUSTRIES, INC.

## (undated) DEVICE — APPLIER CLP L SHFT DIA12MM 20 ROT MULT LIGACLP

## (undated) DEVICE — SEPRAFILM SM

## (undated) DEVICE — Z DISCONTINUED SUGG SUB 2622703 KIT OST L12IN FLNG DIA70MM ST TRNSPAR TWO PC MOLD

## (undated) DEVICE — TOWEL,OR,DSP,ST,BLUE,STD,6/PK,12PK/CS: Brand: MEDLINE

## (undated) DEVICE — STAPLER SKIN L440MM 32MM LNG 12 FIRING B FRM PWR + GRIPPING

## (undated) DEVICE — SOLUTION IRRIG 3000ML 0.9% SOD CHL USP UROMATIC PLAS CONT

## (undated) DEVICE — SYRINGE MED 10ML TRNSLUC BRL PLUNG BLK MRK POLYPR CTRL

## (undated) DEVICE — APPLICATOR MEDICATED 26 CC SOLUTION HI LT ORNG CHLORAPREP

## (undated) DEVICE — SHEET,DRAPE,40X58,STERILE: Brand: MEDLINE

## (undated) DEVICE — SYRINGE IRRIG 60ML SFT PLIABLE BLB EZ TO GRP 1 HND USE W/

## (undated) DEVICE — 3M™ IOBAN™ 2 ANTIMICROBIAL INCISE DRAPE 6640EZ: Brand: IOBAN™ 2

## (undated) DEVICE — YANKAUER,BULB TIP,W/O VENT,RIGID,STERILE: Brand: MEDLINE

## (undated) DEVICE — TROCAR: Brand: KII FIOS FIRST ENTRY

## (undated) DEVICE — SUTURE BAG: Brand: DEVON

## (undated) DEVICE — RELOAD STPL L75MM OPN H3.8MM CLS 1.5MM WIRE DIA0.2MM REG

## (undated) DEVICE — INSUFFLATION NEEDLE TO ESTABLISH PNEUMOPERITONEUM.: Brand: INSUFFLATION NEEDLE

## (undated) DEVICE — PMI PTFE COATED LAPAROSCOPIC WIRE L-HOOK 44 CM: Brand: PMI

## (undated) DEVICE — STAPLER EXT 65MM S STL AUTO DISP PURSTRING

## (undated) DEVICE — GOWN,SIRUS,NONRNF,SETINSLV,XL,20/CS: Brand: MEDLINE